# Patient Record
Sex: FEMALE | Race: WHITE | Employment: UNEMPLOYED | URBAN - METROPOLITAN AREA
[De-identification: names, ages, dates, MRNs, and addresses within clinical notes are randomized per-mention and may not be internally consistent; named-entity substitution may affect disease eponyms.]

---

## 2017-08-10 ENCOUNTER — LAB ENCOUNTER (OUTPATIENT)
Dept: LAB | Age: 57
End: 2017-08-10
Attending: FAMILY MEDICINE
Payer: COMMERCIAL

## 2017-08-10 ENCOUNTER — OFFICE VISIT (OUTPATIENT)
Dept: FAMILY MEDICINE CLINIC | Facility: CLINIC | Age: 57
End: 2017-08-10

## 2017-08-10 VITALS
DIASTOLIC BLOOD PRESSURE: 67 MMHG | BODY MASS INDEX: 26.13 KG/M2 | SYSTOLIC BLOOD PRESSURE: 111 MMHG | HEART RATE: 82 BPM | WEIGHT: 186.63 LBS | HEIGHT: 71 IN

## 2017-08-10 DIAGNOSIS — E03.9 HYPOTHYROIDISM, UNSPECIFIED TYPE: ICD-10-CM

## 2017-08-10 DIAGNOSIS — E78.5 HYPERLIPIDEMIA, UNSPECIFIED HYPERLIPIDEMIA TYPE: ICD-10-CM

## 2017-08-10 DIAGNOSIS — E53.8 VITAMIN B12 DEFICIENCY: ICD-10-CM

## 2017-08-10 DIAGNOSIS — Z00.00 ROUTINE MEDICAL EXAM: ICD-10-CM

## 2017-08-10 DIAGNOSIS — I10 ESSENTIAL HYPERTENSION: Primary | ICD-10-CM

## 2017-08-10 DIAGNOSIS — F10.10 ALCOHOL ABUSE: ICD-10-CM

## 2017-08-10 LAB
ALBUMIN SERPL BCP-MCNC: 4.4 G/DL (ref 3.5–4.8)
ALBUMIN/GLOB SERPL: 1.4 {RATIO} (ref 1–2)
ALP SERPL-CCNC: 61 U/L (ref 32–100)
ALT SERPL-CCNC: 34 U/L (ref 14–54)
ANION GAP SERPL CALC-SCNC: 8 MMOL/L (ref 0–18)
AST SERPL-CCNC: 34 U/L (ref 15–41)
BASOPHILS # BLD: 0 K/UL (ref 0–0.2)
BASOPHILS NFR BLD: 1 %
BILIRUB SERPL-MCNC: 0.5 MG/DL (ref 0.3–1.2)
BUN SERPL-MCNC: 15 MG/DL (ref 8–20)
BUN/CREAT SERPL: 17.6 (ref 10–20)
CALCIUM SERPL-MCNC: 9.7 MG/DL (ref 8.5–10.5)
CHLORIDE SERPL-SCNC: 108 MMOL/L (ref 95–110)
CHOLEST SERPL-MCNC: 203 MG/DL (ref 110–200)
CO2 SERPL-SCNC: 25 MMOL/L (ref 22–32)
CREAT SERPL-MCNC: 0.85 MG/DL (ref 0.5–1.5)
EOSINOPHIL # BLD: 0 K/UL (ref 0–0.7)
EOSINOPHIL NFR BLD: 0 %
ERYTHROCYTE [DISTWIDTH] IN BLOOD BY AUTOMATED COUNT: 14.3 % (ref 11–15)
GLOBULIN PLAS-MCNC: 3.1 G/DL (ref 2.5–3.7)
GLUCOSE SERPL-MCNC: 90 MG/DL (ref 70–99)
HCT VFR BLD AUTO: 39.1 % (ref 35–48)
HDLC SERPL-MCNC: 93 MG/DL
HGB BLD-MCNC: 13.1 G/DL (ref 12–16)
LDLC SERPL CALC-MCNC: 90 MG/DL (ref 0–99)
LYMPHOCYTES # BLD: 1.3 K/UL (ref 1–4)
LYMPHOCYTES NFR BLD: 31 %
MCH RBC QN AUTO: 31.9 PG (ref 27–32)
MCHC RBC AUTO-ENTMCNC: 33.5 G/DL (ref 32–37)
MCV RBC AUTO: 95.2 FL (ref 80–100)
MONOCYTES # BLD: 0.6 K/UL (ref 0–1)
MONOCYTES NFR BLD: 14 %
NEUTROPHILS # BLD AUTO: 2.2 K/UL (ref 1.8–7.7)
NEUTROPHILS NFR BLD: 55 %
NONHDLC SERPL-MCNC: 110 MG/DL
OSMOLALITY UR CALC.SUM OF ELEC: 292 MOSM/KG (ref 275–295)
PLATELET # BLD AUTO: 300 K/UL (ref 140–400)
PMV BLD AUTO: 8.4 FL (ref 7.4–10.3)
POTASSIUM SERPL-SCNC: 4.4 MMOL/L (ref 3.3–5.1)
PROT SERPL-MCNC: 7.5 G/DL (ref 5.9–8.4)
RBC # BLD AUTO: 4.11 M/UL (ref 3.7–5.4)
SODIUM SERPL-SCNC: 141 MMOL/L (ref 136–144)
T3 SERPL-MCNC: 0.75 NG/ML (ref 0.87–1.78)
T4 FREE SERPL-MCNC: 1.03 NG/DL (ref 0.58–1.64)
TRIGL SERPL-MCNC: 99 MG/DL (ref 1–149)
TSH SERPL-ACNC: 0.22 UIU/ML (ref 0.45–5.33)
VIT B12 SERPL-MCNC: 1454 PG/ML (ref 181–914)
WBC # BLD AUTO: 4.1 K/UL (ref 4–11)

## 2017-08-10 PROCEDURE — 36415 COLL VENOUS BLD VENIPUNCTURE: CPT

## 2017-08-10 PROCEDURE — 82306 VITAMIN D 25 HYDROXY: CPT

## 2017-08-10 PROCEDURE — 82607 VITAMIN B-12: CPT

## 2017-08-10 PROCEDURE — 84443 ASSAY THYROID STIM HORMONE: CPT

## 2017-08-10 PROCEDURE — 84439 ASSAY OF FREE THYROXINE: CPT

## 2017-08-10 PROCEDURE — 84480 ASSAY TRIIODOTHYRONINE (T3): CPT

## 2017-08-10 PROCEDURE — 80053 COMPREHEN METABOLIC PANEL: CPT

## 2017-08-10 PROCEDURE — 85025 COMPLETE CBC W/AUTO DIFF WBC: CPT

## 2017-08-10 PROCEDURE — 99396 PREV VISIT EST AGE 40-64: CPT | Performed by: FAMILY MEDICINE

## 2017-08-10 PROCEDURE — 80061 LIPID PANEL: CPT

## 2017-08-10 RX ORDER — AMLODIPINE BESYLATE 10 MG/1
10 TABLET ORAL DAILY
Qty: 90 TABLET | Refills: 4 | Status: SHIPPED | OUTPATIENT
Start: 2017-08-10 | End: 2018-03-14

## 2017-08-10 RX ORDER — LEVOTHYROXINE SODIUM 0.1 MG/1
100 TABLET ORAL DAILY
Qty: 90 TABLET | Refills: 4 | Status: SHIPPED | OUTPATIENT
Start: 2017-08-10 | End: 2018-08-27

## 2017-08-10 RX ORDER — LOSARTAN POTASSIUM 100 MG/1
100 TABLET ORAL DAILY
Qty: 90 TABLET | Refills: 4 | Status: SHIPPED | OUTPATIENT
Start: 2017-08-10 | End: 2018-03-14

## 2017-08-10 NOTE — PROGRESS NOTES
HPI:   Nathalia Cassidy is a 61year old female who presents for a complete physical exam.    Pt here for regular physical. Still drinking large amounts of alcohol and has no desire to stop. Walks 5-6 miles every day - walks on treadmill and walks outside.  Repor REVIEW OF SYSTEMS:   GENERAL: feels well otherwise  SKIN: denies any skin lesions  EYES:denies blurred vision or double vision  HEENT: denies nasal congestion, sinus pain or ST  LUNGS: denies shortness of breath with exertion, denies orthopnea  CARDIOV VITAMIN D, 25-HYDROXY; Future         Marilin Patel MD  8/10/2017  10:11 AM

## 2017-08-11 LAB — 25(OH)D3 SERPL-MCNC: 51.9 NG/ML

## 2017-08-14 ENCOUNTER — TELEPHONE (OUTPATIENT)
Dept: FAMILY MEDICINE CLINIC | Facility: CLINIC | Age: 57
End: 2017-08-14

## 2017-08-14 NOTE — TELEPHONE ENCOUNTER
Dr. Ava Perez received and completed Health Provider Screening form. Form faxed to [phone] confirmation received. Copy sent to [address] [place] [address] scanning department.

## 2017-08-18 DIAGNOSIS — E03.9 HYPOTHYROIDISM, UNSPECIFIED TYPE: ICD-10-CM

## 2017-08-18 DIAGNOSIS — R79.89 ABNORMAL THYROID BLOOD TEST: Primary | ICD-10-CM

## 2017-08-18 NOTE — PROGRESS NOTES
Yojana Saab - your thyroid levels are off right now. Before having you see an endocrinologist - thyroid specialist - will have you repeat those labs in 3-4 weeks. If still abnormal, will need you to see Dr. Fabiola Reyez.  Also you can cut back a little on your vitamin b

## 2018-03-01 ENCOUNTER — APPOINTMENT (OUTPATIENT)
Dept: GENERAL RADIOLOGY | Facility: HOSPITAL | Age: 58
DRG: 853 | End: 2018-03-01
Attending: EMERGENCY MEDICINE
Payer: COMMERCIAL

## 2018-03-01 ENCOUNTER — HOSPITAL ENCOUNTER (INPATIENT)
Facility: HOSPITAL | Age: 58
LOS: 13 days | Discharge: HOME | DRG: 853 | End: 2018-03-14
Attending: EMERGENCY MEDICINE | Admitting: HOSPITALIST
Payer: COMMERCIAL

## 2018-03-01 ENCOUNTER — ANESTHESIA EVENT (OUTPATIENT)
Dept: SURGERY | Facility: HOSPITAL | Age: 58
DRG: 853 | End: 2018-03-01
Payer: COMMERCIAL

## 2018-03-01 ENCOUNTER — ANESTHESIA (OUTPATIENT)
Dept: SURGERY | Facility: HOSPITAL | Age: 58
DRG: 853 | End: 2018-03-01
Payer: COMMERCIAL

## 2018-03-01 ENCOUNTER — SURGERY (OUTPATIENT)
Age: 58
End: 2018-03-01

## 2018-03-01 ENCOUNTER — APPOINTMENT (OUTPATIENT)
Dept: CT IMAGING | Facility: HOSPITAL | Age: 58
DRG: 853 | End: 2018-03-01
Attending: EMERGENCY MEDICINE
Payer: COMMERCIAL

## 2018-03-01 ENCOUNTER — APPOINTMENT (OUTPATIENT)
Dept: GENERAL RADIOLOGY | Facility: HOSPITAL | Age: 58
DRG: 853 | End: 2018-03-01
Attending: ANESTHESIOLOGY
Payer: COMMERCIAL

## 2018-03-01 DIAGNOSIS — K63.1 PERFORATED BOWEL (HCC): ICD-10-CM

## 2018-03-01 DIAGNOSIS — R57.9 SHOCK (HCC): Primary | ICD-10-CM

## 2018-03-01 DIAGNOSIS — E87.8 LOW BICARBONATE: ICD-10-CM

## 2018-03-01 DIAGNOSIS — N17.9 AKI (ACUTE KIDNEY INJURY) (HCC): ICD-10-CM

## 2018-03-01 LAB
ALBUMIN SERPL BCP-MCNC: 3 G/DL (ref 3.5–4.8)
ALP SERPL-CCNC: 40 U/L (ref 32–100)
ALT SERPL-CCNC: 22 U/L (ref 14–54)
AMMONIA PLAS-MCNC: 61 UG/DL (ref 19.5–64.6)
ANION GAP SERPL CALC-SCNC: 19 MMOL/L (ref 0–18)
ANTIBODY SCREEN: NEGATIVE
APTT PPP: 23.2 SECONDS (ref 23.2–35.3)
APTT PPP: 27.5 SECONDS (ref 23.2–35.3)
AST SERPL-CCNC: 33 U/L (ref 15–41)
BASE EXCESS BLD CALC-SCNC: -12.1 MMOL/L (ref ?–2)
BASE EXCESS BLD CALC-SCNC: -18.1 MMOL/L (ref ?–2)
BASE EXCESS BLD CALC-SCNC: -5.4 MMOL/L (ref ?–2)
BASE EXCESS BLD CALC-SCNC: -7.1 MMOL/L (ref ?–2)
BASOPHILS # BLD: 0 K/UL (ref 0–0.2)
BASOPHILS NFR BLD: 0 %
BILIRUB DIRECT SERPL-MCNC: 0.2 MG/DL (ref 0–0.2)
BILIRUB SERPL-MCNC: 1.3 MG/DL (ref 0.3–1.2)
BILIRUB UR QL: NEGATIVE
BLOOD TYPE BARCODE: 5100
BLOOD TYPE BARCODE: 5100
BLOOD TYPE BARCODE: 6200
BUN SERPL-MCNC: 50 MG/DL (ref 8–20)
BUN/CREAT SERPL: 18.2 (ref 10–20)
CA-I BLD-SCNC: 1.23 MMOL/L (ref 0.95–1.32)
CA-I BLD-SCNC: 1.25 MMOL/L (ref 0.95–1.32)
CA-I BLD-SCNC: 1.27 MMOL/L (ref 0.95–1.32)
CALCIUM SERPL-MCNC: 9.6 MG/DL (ref 8.5–10.5)
CHLORIDE SERPL-SCNC: 102 MMOL/L (ref 95–110)
CHOLEST SERPL-MCNC: 171 MG/DL (ref 110–200)
CO2 SERPL-SCNC: 11 MMOL/L (ref 22–32)
COHGB MFR BLD: 1.5 % (ref 0–1.5)
COHGB MFR BLD: 1.7 % (ref 0–1.5)
COHGB MFR BLD: 1.8 % (ref 0–1.5)
COLOR UR: YELLOW
CREAT SERPL-MCNC: 2.74 MG/DL (ref 0.5–1.5)
D DIMER PPP FEU-MCNC: 2.72 MCG/ML (ref ?–0.58)
EOSINOPHIL # BLD: 0 K/UL (ref 0–0.7)
EOSINOPHIL NFR BLD: 0 %
ERYTHROCYTE [DISTWIDTH] IN BLOOD BY AUTOMATED COUNT: 13.4 % (ref 11–15)
ERYTHROCYTE [DISTWIDTH] IN BLOOD BY AUTOMATED COUNT: 13.6 % (ref 11–15)
ERYTHROCYTE [DISTWIDTH] IN BLOOD BY AUTOMATED COUNT: 13.8 % (ref 11–15)
FIBRINOGEN PPP-MCNC: 304 MG/DL (ref 176–491)
GLUCOSE BLDC GLUCOMTR-MCNC: 293 MG/DL (ref 70–99)
GLUCOSE SERPL-MCNC: 319 MG/DL (ref 70–99)
GLUCOSE UR-MCNC: 50 MG/DL
HCO3 BLDA-SCNC: 15.8 MEQ/L (ref 21–27)
HCO3 BLDA-SCNC: 19.3 MEQ/L (ref 21–27)
HCO3 BLDA-SCNC: 20.4 MEQ/L (ref 21–27)
HCO3 BLDA-SCNC: 8.6 MEQ/L (ref 21–27)
HCT VFR BLD AUTO: 38.2 % (ref 35–48)
HCT VFR BLD AUTO: 45.1 % (ref 35–48)
HCT VFR BLD AUTO: 47.7 % (ref 35–48)
HDLC SERPL-MCNC: 92 MG/DL
HGB BLD-MCNC: 11.6 G/DL (ref 12–16)
HGB BLD-MCNC: 12.5 G/DL (ref 12–16)
HGB BLD-MCNC: 12.7 G/DL (ref 12–16)
HGB BLD-MCNC: 15 G/DL (ref 12–16)
HGB BLD-MCNC: 15.4 G/DL (ref 12–16)
HGB BLD-MCNC: 15.4 G/DL (ref 12–16)
INR BLD: 1.1 (ref 0.9–1.2)
INR BLD: 1.3 (ref 0.9–1.2)
LACTATE SERPL-SCNC: 1.8 MMOL/L (ref 0.5–2.2)
LACTATE SERPL-SCNC: 2.1 MMOL/L (ref 0.5–2.2)
LACTATE SERPL-SCNC: 9.5 MMOL/L (ref 0.5–2.2)
LDLC SERPL CALC-MCNC: 50 MG/DL (ref 0–99)
LEUKOCYTE ESTERASE UR QL STRIP.AUTO: NEGATIVE
LIPASE SERPL-CCNC: 97 U/L (ref 22–51)
LYMPHOCYTES # BLD: 0.4 K/UL (ref 1–4)
LYMPHOCYTES # BLD: 0.5 K/UL (ref 1–4)
LYMPHOCYTES # BLD: 0.7 K/UL (ref 1–4)
LYMPHOCYTES NFR BLD: 18 %
LYMPHOCYTES NFR BLD: 21 %
LYMPHOCYTES NFR BLD: 21 %
MAGNESIUM SERPL-MCNC: 2 MG/DL (ref 1.8–2.5)
MAGNESIUM SERPL-MCNC: 2 MG/DL (ref 1.8–2.5)
MCH RBC QN AUTO: 31.6 PG (ref 27–32)
MCH RBC QN AUTO: 31.8 PG (ref 27–32)
MCH RBC QN AUTO: 31.8 PG (ref 27–32)
MCHC RBC AUTO-ENTMCNC: 32.2 G/DL (ref 32–37)
MCHC RBC AUTO-ENTMCNC: 33.2 G/DL (ref 32–37)
MCHC RBC AUTO-ENTMCNC: 33.2 G/DL (ref 32–37)
MCV RBC AUTO: 95.2 FL (ref 80–100)
MCV RBC AUTO: 95.8 FL (ref 80–100)
MCV RBC AUTO: 98.7 FL (ref 80–100)
METHGB MFR BLD: 0.4 % SAT (ref 0.4–1.5)
METHGB MFR BLD: 0.4 % SAT (ref 0.4–1.5)
METHGB MFR BLD: 0.5 % SAT (ref 0.4–1.5)
MONOCYTES # BLD: 0.1 K/UL (ref 0–1)
MONOCYTES # BLD: 0.2 K/UL (ref 0–1)
MONOCYTES # BLD: 0.2 K/UL (ref 0–1)
MONOCYTES NFR BLD: 6 %
MONOCYTES NFR BLD: 7 %
MONOCYTES NFR BLD: 8 %
NEUTROPHILS # BLD AUTO: 1.3 K/UL (ref 1.8–7.7)
NEUTROPHILS # BLD AUTO: 1.6 K/UL (ref 1.8–7.7)
NEUTROPHILS # BLD AUTO: 2.9 K/UL (ref 1.8–7.7)
NEUTROPHILS NFR BLD: 71 %
NEUTROPHILS NFR BLD: 71 %
NEUTROPHILS NFR BLD: 75 %
NITRITE UR QL STRIP.AUTO: NEGATIVE
NONHDLC SERPL-MCNC: 79 MG/DL
O2 CT BLD-SCNC: 16.1 VOL% (ref 15–23)
O2 CT BLD-SCNC: 16.7 VOL% (ref 15–23)
O2 CT BLD-SCNC: 18 VOL% (ref 15–23)
O2 CT BLD-SCNC: 18.7 VOL% (ref 15–23)
O2/TOTAL GAS SETTING VFR VENT: 100 %
OPIATES UR QL SCN: DETECTED
OSMOLALITY UR CALC.SUM OF ELEC: 300 MOSM/KG (ref 275–295)
OXYGEN LITERS/MINUTE: 4 L/MIN
PCO2 BLDA: 23 MM HG (ref 35–45)
PCO2 BLDA: 37 MM HG (ref 35–45)
PCO2 BLDA: 43 MM HG (ref 35–45)
PCO2 BLDA: 52 MM HG (ref 35–45)
PEEP SETTING VENT: 5 CM H2O
PH BLDA: 7.19 [PH] (ref 7.35–7.45)
PH BLDA: 7.2 [PH] (ref 7.35–7.45)
PH BLDA: 7.22 [PH] (ref 7.35–7.45)
PH BLDA: 7.34 [PH] (ref 7.35–7.45)
PH UR: 5 [PH] (ref 5–8)
PLATELET # BLD AUTO: 160 K/UL (ref 140–400)
PLATELET # BLD AUTO: 244 K/UL (ref 140–400)
PLATELET # BLD AUTO: 245 K/UL (ref 140–400)
PLATELET # BLD AUTO: 348 K/UL (ref 140–400)
PMV BLD AUTO: 8.3 FL (ref 7.4–10.3)
PMV BLD AUTO: 8.8 FL (ref 7.4–10.3)
PMV BLD AUTO: 9.1 FL (ref 7.4–10.3)
PO2 BLDA: 137 MM HG (ref 80–100)
PO2 BLDA: 62 MM HG (ref 80–100)
PO2 BLDA: 71 MM HG (ref 80–100)
PO2 BLDA: 71 MM HG (ref 80–100)
PO2 SATN ADJ TO 0.5 BLD: 20 MMHG (ref 24–28)
PO2 SATN ADJ TO 0.5 BLD: 30 MMHG (ref 24–28)
PO2 SATN ADJ TO 0.5 BLD: 32 MMHG (ref 24–28)
PO2 SATN ADJ TO 0.5 BLD: 32 MMHG (ref 24–28)
POTASSIUM (BLOOD GAS): 4.1 MMOL/L (ref 3.3–5.1)
POTASSIUM (BLOOD GAS): 4.4 MMOL/L (ref 3.3–5.1)
POTASSIUM (BLOOD GAS): 4.6 MMOL/L (ref 3.3–5.1)
POTASSIUM SERPL-SCNC: 4.8 MMOL/L (ref 3.3–5.1)
PROT SERPL-MCNC: 5.6 G/DL (ref 5.9–8.4)
PROT UR-MCNC: 100 MG/DL
PROTHROMBIN TIME: 14.2 SECONDS (ref 11.8–14.5)
PROTHROMBIN TIME: 15.8 SECONDS (ref 11.8–14.5)
PUNCTURE CHARGE: NO
PUNCTURE CHARGE: YES
RBC # BLD AUTO: 4.01 M/UL (ref 3.7–5.4)
RBC # BLD AUTO: 4.7 M/UL (ref 3.7–5.4)
RBC # BLD AUTO: 4.83 M/UL (ref 3.7–5.4)
RBC #/AREA URNS AUTO: 2 /HPF
RESP RATE: 16 BPM
RH BLOOD TYPE: POSITIVE
SAO2 % BLDA: 88.6 % (ref 94–100)
SAO2 % BLDA: 91 % (ref 94–100)
SAO2 % BLDA: 96.6 % (ref 94–100)
SAO2 % BLDA: >98.5 % (ref 94–100)
SODIUM (BLOOD GAS): 142 MMOL/L (ref 135–145)
SODIUM (BLOOD GAS): 146 MMOL/L (ref 135–145)
SODIUM (BLOOD GAS): 146 MMOL/L (ref 135–145)
SODIUM SERPL-SCNC: 132 MMOL/L (ref 136–144)
SP GR UR STRIP: 1.02 (ref 1–1.03)
SPECIMEN VOL 24H UR: 500 ML
TRIGL SERPL-MCNC: 143 MG/DL (ref 1–149)
TROPONIN I SERPL-MCNC: 0.04 NG/ML (ref ?–0.03)
UROBILINOGEN UR STRIP-ACNC: <2
VIT C UR-MCNC: NEGATIVE MG/DL
WBC # BLD AUTO: 1.8 K/UL (ref 4–11)
WBC # BLD AUTO: 2.3 K/UL (ref 4–11)
WBC # BLD AUTO: 3.9 K/UL (ref 4–11)
WBC #/AREA URNS AUTO: 5 /HPF

## 2018-03-01 PROCEDURE — 30233N1 TRANSFUSION OF NONAUTOLOGOUS RED BLOOD CELLS INTO PERIPHERAL VEIN, PERCUTANEOUS APPROACH: ICD-10-PCS | Performed by: EMERGENCY MEDICINE

## 2018-03-01 PROCEDURE — 99291 CRITICAL CARE FIRST HOUR: CPT | Performed by: INTERNAL MEDICINE

## 2018-03-01 PROCEDURE — 0D9640Z DRAINAGE OF STOMACH WITH DRAINAGE DEVICE, PERCUTANEOUS ENDOSCOPIC APPROACH: ICD-10-PCS | Performed by: SURGERY

## 2018-03-01 PROCEDURE — 5A1935Z RESPIRATORY VENTILATION, LESS THAN 24 CONSECUTIVE HOURS: ICD-10-PCS | Performed by: SURGERY

## 2018-03-01 PROCEDURE — 71045 X-RAY EXAM CHEST 1 VIEW: CPT | Performed by: ANESTHESIOLOGY

## 2018-03-01 PROCEDURE — 71045 X-RAY EXAM CHEST 1 VIEW: CPT | Performed by: EMERGENCY MEDICINE

## 2018-03-01 PROCEDURE — 0DQ74ZZ REPAIR STOMACH, PYLORUS, PERCUTANEOUS ENDOSCOPIC APPROACH: ICD-10-PCS | Performed by: SURGERY

## 2018-03-01 PROCEDURE — 71260 CT THORAX DX C+: CPT | Performed by: EMERGENCY MEDICINE

## 2018-03-01 PROCEDURE — 05HM33Z INSERTION OF INFUSION DEVICE INTO RIGHT INTERNAL JUGULAR VEIN, PERCUTANEOUS APPROACH: ICD-10-PCS | Performed by: EMERGENCY MEDICINE

## 2018-03-01 PROCEDURE — 74177 CT ABD & PELVIS W/CONTRAST: CPT | Performed by: EMERGENCY MEDICINE

## 2018-03-01 RX ORDER — MORPHINE SULFATE 4 MG/ML
4 INJECTION, SOLUTION INTRAMUSCULAR; INTRAVENOUS EVERY 2 HOUR PRN
Status: DISCONTINUED | OUTPATIENT
Start: 2018-03-01 | End: 2018-03-14

## 2018-03-01 RX ORDER — ONDANSETRON 2 MG/ML
INJECTION INTRAMUSCULAR; INTRAVENOUS
Status: DISPENSED
Start: 2018-03-01 | End: 2018-03-01

## 2018-03-01 RX ORDER — MORPHINE SULFATE 2 MG/ML
2 INJECTION, SOLUTION INTRAMUSCULAR; INTRAVENOUS ONCE
Status: COMPLETED | OUTPATIENT
Start: 2018-03-01 | End: 2018-03-01

## 2018-03-01 RX ORDER — SODIUM CHLORIDE 9 MG/ML
INJECTION, SOLUTION INTRAVENOUS CONTINUOUS PRN
Status: DISCONTINUED | OUTPATIENT
Start: 2018-03-01 | End: 2018-03-01 | Stop reason: SURG

## 2018-03-01 RX ORDER — MORPHINE SULFATE 4 MG/ML
4 INJECTION, SOLUTION INTRAMUSCULAR; INTRAVENOUS ONCE
Status: COMPLETED | OUTPATIENT
Start: 2018-03-01 | End: 2018-03-01

## 2018-03-01 RX ORDER — MORPHINE SULFATE 4 MG/ML
8 INJECTION, SOLUTION INTRAMUSCULAR; INTRAVENOUS EVERY 2 HOUR PRN
Status: DISCONTINUED | OUTPATIENT
Start: 2018-03-01 | End: 2018-03-14

## 2018-03-01 RX ORDER — MORPHINE SULFATE 2 MG/ML
2 INJECTION, SOLUTION INTRAMUSCULAR; INTRAVENOUS EVERY 2 HOUR PRN
Status: DISCONTINUED | OUTPATIENT
Start: 2018-03-01 | End: 2018-03-14

## 2018-03-01 RX ORDER — ONDANSETRON 2 MG/ML
4 INJECTION INTRAMUSCULAR; INTRAVENOUS EVERY 6 HOURS PRN
Status: DISCONTINUED | OUTPATIENT
Start: 2018-03-01 | End: 2018-03-14

## 2018-03-01 RX ORDER — 0.9 % SODIUM CHLORIDE 0.9 %
VIAL (ML) INJECTION
Status: COMPLETED
Start: 2018-03-01 | End: 2018-03-01

## 2018-03-01 RX ORDER — EPHEDRINE SULFATE 50 MG/ML
INJECTION, SOLUTION INTRAVENOUS AS NEEDED
Status: DISCONTINUED | OUTPATIENT
Start: 2018-03-01 | End: 2018-03-01 | Stop reason: SURG

## 2018-03-01 RX ORDER — MELATONIN
100 DAILY
Status: DISCONTINUED | OUTPATIENT
Start: 2018-03-02 | End: 2018-03-02

## 2018-03-01 RX ORDER — ROCURONIUM BROMIDE 10 MG/ML
INJECTION, SOLUTION INTRAVENOUS AS NEEDED
Status: DISCONTINUED | OUTPATIENT
Start: 2018-03-01 | End: 2018-03-01 | Stop reason: SURG

## 2018-03-01 RX ORDER — LORAZEPAM 2 MG/ML
1 INJECTION INTRAMUSCULAR EVERY 30 MIN PRN
Status: DISCONTINUED | OUTPATIENT
Start: 2018-03-01 | End: 2018-03-08

## 2018-03-01 RX ORDER — LIDOCAINE HYDROCHLORIDE 10 MG/ML
INJECTION, SOLUTION EPIDURAL; INFILTRATION; INTRACAUDAL; PERINEURAL AS NEEDED
Status: DISCONTINUED | OUTPATIENT
Start: 2018-03-01 | End: 2018-03-01 | Stop reason: SURG

## 2018-03-01 RX ORDER — LORAZEPAM 2 MG/ML
4 INJECTION INTRAMUSCULAR EVERY 10 MIN PRN
Status: DISCONTINUED | OUTPATIENT
Start: 2018-03-01 | End: 2018-03-08

## 2018-03-01 RX ORDER — ETOMIDATE 2 MG/ML
INJECTION INTRAVENOUS AS NEEDED
Status: DISCONTINUED | OUTPATIENT
Start: 2018-03-01 | End: 2018-03-01 | Stop reason: SURG

## 2018-03-01 RX ORDER — HEPARIN SODIUM 1000 [USP'U]/ML
INJECTION, SOLUTION INTRAVENOUS; SUBCUTANEOUS AS NEEDED
Status: DISCONTINUED | OUTPATIENT
Start: 2018-03-01 | End: 2018-03-01 | Stop reason: HOSPADM

## 2018-03-01 RX ORDER — LORAZEPAM 2 MG/ML
3 INJECTION INTRAMUSCULAR
Status: DISCONTINUED | OUTPATIENT
Start: 2018-03-01 | End: 2018-03-08

## 2018-03-01 RX ORDER — SODIUM CHLORIDE 9 MG/ML
INJECTION, SOLUTION INTRAVENOUS CONTINUOUS
Status: DISCONTINUED | OUTPATIENT
Start: 2018-03-01 | End: 2018-03-08

## 2018-03-01 RX ORDER — ALBUMIN, HUMAN INJ 5% 5 %
500 SOLUTION INTRAVENOUS ONCE
Status: COMPLETED | OUTPATIENT
Start: 2018-03-01 | End: 2018-03-01

## 2018-03-01 RX ORDER — MORPHINE SULFATE 2 MG/ML
INJECTION, SOLUTION INTRAMUSCULAR; INTRAVENOUS
Status: DISPENSED
Start: 2018-03-01 | End: 2018-03-01

## 2018-03-01 RX ORDER — ONDANSETRON 2 MG/ML
4 INJECTION INTRAMUSCULAR; INTRAVENOUS ONCE
Status: COMPLETED | OUTPATIENT
Start: 2018-03-01 | End: 2018-03-01

## 2018-03-01 RX ORDER — MIDAZOLAM HYDROCHLORIDE 1 MG/ML
INJECTION INTRAMUSCULAR; INTRAVENOUS AS NEEDED
Status: DISCONTINUED | OUTPATIENT
Start: 2018-03-01 | End: 2018-03-01 | Stop reason: SURG

## 2018-03-01 RX ORDER — HEPARIN SODIUM 5000 [USP'U]/ML
5000 INJECTION, SOLUTION INTRAVENOUS; SUBCUTANEOUS EVERY 12 HOURS SCHEDULED
Status: DISCONTINUED | OUTPATIENT
Start: 2018-03-02 | End: 2018-03-14

## 2018-03-01 RX ORDER — PHENYLEPHRINE HCL 10 MG/ML
VIAL (ML) INJECTION AS NEEDED
Status: DISCONTINUED | OUTPATIENT
Start: 2018-03-01 | End: 2018-03-01 | Stop reason: SURG

## 2018-03-01 RX ORDER — BUPIVACAINE HYDROCHLORIDE 2.5 MG/ML
INJECTION, SOLUTION EPIDURAL; INFILTRATION; INTRACAUDAL AS NEEDED
Status: DISCONTINUED | OUTPATIENT
Start: 2018-03-01 | End: 2018-03-01 | Stop reason: HOSPADM

## 2018-03-01 RX ORDER — FOLIC ACID 1 MG/1
1 TABLET ORAL DAILY
Status: DISCONTINUED | OUTPATIENT
Start: 2018-03-01 | End: 2018-03-02

## 2018-03-01 RX ORDER — ALBUMIN (HUMAN) 12.5 G/50ML
25 SOLUTION INTRAVENOUS ONCE
Status: DISCONTINUED | OUTPATIENT
Start: 2018-03-01 | End: 2018-03-14

## 2018-03-01 RX ORDER — LORAZEPAM 2 MG/ML
2 INJECTION INTRAMUSCULAR
Status: DISCONTINUED | OUTPATIENT
Start: 2018-03-01 | End: 2018-03-08

## 2018-03-01 RX ORDER — FLUCONAZOLE 2 MG/ML
400 INJECTION, SOLUTION INTRAVENOUS EVERY 24 HOURS
Status: DISCONTINUED | OUTPATIENT
Start: 2018-03-01 | End: 2018-03-01 | Stop reason: HOSPADM

## 2018-03-01 RX ORDER — CALCIUM CHLORIDE 100 MG/ML
INJECTION INTRAVENOUS; INTRAVENTRICULAR AS NEEDED
Status: DISCONTINUED | OUTPATIENT
Start: 2018-03-01 | End: 2018-03-01 | Stop reason: SURG

## 2018-03-01 RX ORDER — FLUCONAZOLE 2 MG/ML
400 INJECTION, SOLUTION INTRAVENOUS EVERY 24 HOURS
Status: DISCONTINUED | OUTPATIENT
Start: 2018-03-02 | End: 2018-03-14

## 2018-03-01 RX ORDER — ONDANSETRON 2 MG/ML
INJECTION INTRAMUSCULAR; INTRAVENOUS AS NEEDED
Status: DISCONTINUED | OUTPATIENT
Start: 2018-03-01 | End: 2018-03-01 | Stop reason: SURG

## 2018-03-01 RX ORDER — MULTIPLE VITAMINS W/ MINERALS TAB 9MG-400MCG
1 TAB ORAL DAILY
Status: DISCONTINUED | OUTPATIENT
Start: 2018-03-01 | End: 2018-03-02

## 2018-03-01 RX ORDER — METOCLOPRAMIDE HYDROCHLORIDE 5 MG/ML
5 INJECTION INTRAMUSCULAR; INTRAVENOUS EVERY 6 HOURS PRN
Status: DISCONTINUED | OUTPATIENT
Start: 2018-03-01 | End: 2018-03-06

## 2018-03-01 RX ORDER — ALBUMIN, HUMAN INJ 5% 5 %
SOLUTION INTRAVENOUS CONTINUOUS PRN
Status: DISCONTINUED | OUTPATIENT
Start: 2018-03-01 | End: 2018-03-01 | Stop reason: SURG

## 2018-03-01 RX ORDER — SODIUM CHLORIDE 9 MG/ML
INJECTION, SOLUTION INTRAVENOUS ONCE
Status: COMPLETED | OUTPATIENT
Start: 2018-03-01 | End: 2018-03-01

## 2018-03-01 RX ORDER — DEXAMETHASONE SODIUM PHOSPHATE 4 MG/ML
VIAL (ML) INJECTION AS NEEDED
Status: DISCONTINUED | OUTPATIENT
Start: 2018-03-01 | End: 2018-03-01 | Stop reason: SURG

## 2018-03-01 RX ORDER — SODIUM CHLORIDE 9 MG/ML
INJECTION, SOLUTION INTRAVENOUS
Status: COMPLETED
Start: 2018-03-01 | End: 2018-03-01

## 2018-03-01 RX ADMIN — SODIUM CHLORIDE: 9 INJECTION, SOLUTION INTRAVENOUS at 10:15:00

## 2018-03-01 RX ADMIN — MIDAZOLAM HYDROCHLORIDE 2 MG: 1 INJECTION INTRAMUSCULAR; INTRAVENOUS at 10:00:00

## 2018-03-01 RX ADMIN — Medication 50 ML: at 10:10:00

## 2018-03-01 RX ADMIN — ALBUMIN, HUMAN INJ 5%: 5 SOLUTION INTRAVENOUS at 11:04:00

## 2018-03-01 RX ADMIN — LIDOCAINE HYDROCHLORIDE 50 MG: 10 INJECTION, SOLUTION EPIDURAL; INFILTRATION; INTRACAUDAL; PERINEURAL at 10:05:00

## 2018-03-01 RX ADMIN — ONDANSETRON 4 MG: 2 INJECTION INTRAMUSCULAR; INTRAVENOUS at 12:34:00

## 2018-03-01 RX ADMIN — EPHEDRINE SULFATE 10 MG: 50 INJECTION, SOLUTION INTRAVENOUS at 10:05:00

## 2018-03-01 RX ADMIN — ROCURONIUM BROMIDE 10 MG: 10 INJECTION, SOLUTION INTRAVENOUS at 11:35:00

## 2018-03-01 RX ADMIN — PHENYLEPHRINE HCL 200 MCG: 10 MG/ML VIAL (ML) INJECTION at 10:05:00

## 2018-03-01 RX ADMIN — ROCURONIUM BROMIDE 50 MG: 10 INJECTION, SOLUTION INTRAVENOUS at 10:30:00

## 2018-03-01 RX ADMIN — ETOMIDATE 20 MG: 2 INJECTION INTRAVENOUS at 10:05:00

## 2018-03-01 RX ADMIN — SODIUM CHLORIDE: 9 INJECTION, SOLUTION INTRAVENOUS at 12:07:00

## 2018-03-01 RX ADMIN — SODIUM CHLORIDE: 9 INJECTION, SOLUTION INTRAVENOUS at 10:45:00

## 2018-03-01 RX ADMIN — ALBUMIN, HUMAN INJ 5%: 5 SOLUTION INTRAVENOUS at 10:50:00

## 2018-03-01 RX ADMIN — ALBUMIN, HUMAN INJ 5%: 5 SOLUTION INTRAVENOUS at 10:46:00

## 2018-03-01 RX ADMIN — ALBUMIN, HUMAN INJ 5%: 5 SOLUTION INTRAVENOUS at 10:35:00

## 2018-03-01 RX ADMIN — SODIUM CHLORIDE: 9 INJECTION, SOLUTION INTRAVENOUS at 11:30:00

## 2018-03-01 RX ADMIN — SODIUM CHLORIDE: 9 INJECTION, SOLUTION INTRAVENOUS at 12:40:00

## 2018-03-01 RX ADMIN — CALCIUM CHLORIDE 1 G: 100 INJECTION INTRAVENOUS; INTRAVENTRICULAR at 10:30:00

## 2018-03-01 RX ADMIN — ALBUMIN, HUMAN INJ 5%: 5 SOLUTION INTRAVENOUS at 10:00:00

## 2018-03-01 RX ADMIN — DEXAMETHASONE SODIUM PHOSPHATE 4 MG: 4 MG/ML VIAL (ML) INJECTION at 10:05:00

## 2018-03-01 RX ADMIN — Medication 50 ML: at 11:50:00

## 2018-03-01 RX ADMIN — Medication 50 ML: at 10:48:00

## 2018-03-01 RX ADMIN — PHENYLEPHRINE HCL 100 MCG: 10 MG/ML VIAL (ML) INJECTION at 10:15:00

## 2018-03-01 RX ADMIN — SODIUM CHLORIDE: 9 INJECTION, SOLUTION INTRAVENOUS at 09:48:00

## 2018-03-01 NOTE — ED NOTES
Sepsis note:     Total volume received  6000 ml    Time Blood Cultures Drawn: 0705    Time first antibiotic started:  0706    Repeat Lactate due at 1005

## 2018-03-01 NOTE — BRIEF OP NOTE
Pre-Operative Diagnosis: Perforated ulcer ([facility] [address] [K27.5]     Post-Operative Diagnosis: Perforated Gastric  ulcer ([address] [K27.5]     Procedure Performed:   Procedure(s):  DIAGNOSTIC LAPAROSCOPY, LAPAROSCOPIC REPAIR OF PERFORATED ULCER/VISCUS, INSERTION OF

## 2018-03-01 NOTE — ED NOTES
Report given to RN at surgery. Pt updated with plan of care after speaking with surgeon and anesthesiologist. Pradeep De Leon on chart, will send with pt. Pt currently in stable condition, remains on levo gtt at 10 mcg/min. Aox3.

## 2018-03-01 NOTE — ED NOTES
Pt states abd pain remains 8/10 after ordered pain meds given. Dr. Jermaine Bills updated. Awaiting MD orders.

## 2018-03-01 NOTE — ANESTHESIA POSTPROCEDURE EVALUATION
Patient: Anjelica Monet    Procedure Summary     Date:  03/01/18 Room / Location:  [address] [place] [address] MAIN OR 04 / [address] [place] [address] MAIN OR    Anesthesia Start:  4973 Anesthesia Stop:      Procedure:  LAPAROSCOPIC PERFORATED ULCER REPAIR (N/A ) Diagnosis:       Perforated ulcer ([address]

## 2018-03-01 NOTE — H&P
[facility] Patient Status:  Emergency    1960 MRN [id number]   Location [address] Attending Junie Demarco MD   Hosp Day # 0 PCP Jack Vargas MD     Date of Admissi Not on file.     Other Topics            Concern  Caffeine Concern        Yes    Comment:Coffee, occasionally    Social History Narrative    None on file            Current Medications:    Current Facility-Administered Medications:  norepinephrine (LEVOPHED ALKPHO 40 03/01/2018   TP 5.6 (L) 03/01/2018   AST 33 03/01/2018   ALT 22 03/01/2018   PTT 23.2 03/01/2018   INR 1.1 03/01/2018   PTP 14.2 03/01/2018   T4F 1.03 08/10/2017   TSH 0.22 (L) 08/10/2017   LIP 97 (H) 03/01/2018   TROP 0.04 (HH) 03/01/2018   B1 effort. Right lung is grossly clear. Airspace opacity in the left base, with blunting of the left costophrenic angle. Appearance may be due to atelectasis or pneumonia with a small effusion. BONES: Stable.  OTHER: Since the prior study, left PICC line has b aorta. The thoracic aorta is otherwise unremarkable and not dilated. Mediastinal fat planes are preserved. Cardiac chambers are unremarkable. Pericardium is normal. There are coronary artery calcifications.  The main pulmonary artery has a normal diameter a Abdomen Pelvis Iv Contrast, No Oral (er)    Result Date: 3/1/2018  PROCEDURE: CT ABDOMEN PELVIS IV CONTRAST NO ORAL (ER)  COMPARISON: [facility], CT CHEST PAIN/PE (IV ONLY) EM, 3/01/2018, 6:07.   [facility], XR CHEST AP WES VASCULATURE:   No aneurysm is detected. RETROPERITONEUM: No mass or lymphadenopathy is apparent. BONES:   There are post surgical changes of bilateral total hip arthroplasty. Resultant streak artifact limits assessment.  Chronic L2 vertebral body compress Acute renal failure  6. Leukopenia  7. Hypoalbuminemia  8. Hypothyroidism    Plan   -Patient presents with evidence of severe abdominal pain and near syncopal episode.   -CT abdomen pelvis with evidence of perforated viscus and extensive pneumoperitoneum

## 2018-03-01 NOTE — OPERATIVE REPORT
[facility]    PATIENT'S NAME: Arsh Courser   ATTENDING PHYSICIAN: Taye Torres MD   OPERATING PHYSICIAN: Jessica Hale MD   PATIENT ACCOUNT#:   [id number]    LOCATION:  [place] [address]  MEDICAL RECORD #:   [id number]       DATE OF BIRTH: Abundant irrigation with normal saline was continued throughout the procedure. Actually, approximately 9 L of saline were utilized throughout the procedure for irrigation until the return was clean, clear. Eventually we visualized the perforated ulcer. to close the defect at the umbilical port site. The other 5 mm ports were removed in a progressive fashion as we observed the abdomen through the 5 mm scope, confirming on the way out again good hemostasis and cleanliness of the abdomen.   The incisions we

## 2018-03-01 NOTE — CONSULTS
[facility]    PATIENT'S NAME: Cherelle Montoya   ATTENDING PHYSICIAN: Shiraz Martines MD   CONSULTING PHYSICIAN: Emeterio Mcintyre MD   PATIENT ACCOUNT#:   [id number]    LOCATION:  [address]  MEDICAL RECORD #:   [id number]       DATE OF BIRTH: revealed the presence of a significant amount of pneumoperitoneum, possibly due to a perforated gastric ulcer.   Although I understand there is a possibility of pneumatosis on the cecum and this could be a secondary diagnosis with the possibility of pneumop is an alcoholic. She lives with family, with her . She is disabled but she is self-sufficient on the activities of basic daily living. FAMILY HISTORY:  Arthritis and coronary artery disease. Father has had an aortic valve replacement.   There i age, race, and sex. Ophthalmological examination is grossly normal.  Pupils are equal, react to light and accommodation normally. The external ocular movements are intact. ENT exam shows otherwise pink mucosa without ulceration or tumoral pathology.    Nicole Mcghee

## 2018-03-01 NOTE — ADDENDUM NOTE
Addendum  created 03/01/18 1542 by Anuj Patel MD    Anesthesia Intra Flowsheets edited, Sign clinical note

## 2018-03-01 NOTE — ED NOTES
First unit of O neg blood transfusion done at this time, pt remains diaphoretic, alert and oriented, sts that she is feeling a little better but still dizzy, Dr Lucien Crane is aware

## 2018-03-01 NOTE — PROGRESS NOTES
[facility]      Sepsis Reassessment Note    BP 95/74   Pulse 105   Temp 97.6 °F (36.4 °C) (Oral)   Resp 30   SpO2 95%      9:18 AM    Cardiac:  Regularity: Regular  Rate: Tachycardic  Heart Sounds: S1,S2    Lungs:   Right: Clear  Left: Cl

## 2018-03-01 NOTE — ED PROVIDER NOTES
Patient Seen in: [facility] AND [facility] Emergency Department    History   Patient presents with:  Abdomen/Flank Pain (GI/): +MIDABDOMINAL PAIN    Stated Complaint: abdominal pain    HPI    History is provided by EMS and patient.     69-year-old female with h [03/01/18 0543]  Pulse: 112 [03/01/18 0543]  Resp: 22 [03/01/18 0543]  Temp: 97.6 °F (36.4 °C) [03/01/18 0543]  Temp src: Oral [03/01/18 0543]  SpO2: 100 % [03/01/18 0545]  O2 Device: None (Room air) [03/01/18 0558]    Current:BP (!) 74/61   Pulse 85   Tem seen  A hypoechoic stripe between the left kidney and spleen was seen  A pericardial effusion was not seen  Free fluid in the pelvis was seen    A confirmatory study by radiology was  performed. Digital images archived and paper images placed on chart. LAVENDER TALL (BNP)   Collection Time: 03/01/18  5:55 AM   Result Value Ref Range   Hold Lavender Auto Resulted    -BASIC METABOLIC PANEL (8)   Collection Time: 03/01/18  5:55 AM   Result Value Ref Range   Glucose 319 (H) 70 - 99 mg/dL   Sodium 132 (L) 136 Time: 03/01/18  5:55 AM   Result Value Ref Range   AST 33 15 - 41 U/L   ALT 22 14 - 54 U/L   Alkaline Phosphatase 40 32 - 100 U/L   Bilirubin, Total 1.3 (H) 0.3 - 1.2 mg/dL   Total Protein 5.6 (L) 5.9 - 8.4 g/dL   Albumin 3.0 (L) 3.5 - 4.8 g/dL   Bilirubin [id number]    UNIT ABO O    UNIT RH POS    Product Status Issued    Expiration Date [id number]    Blood Type Barcode 5100    -PREPARE FRESH FROZEN PLASMA   Collection Time: 03/01/18  6:56 AM   Result Value Ref Range   Blood Product [id number]    Unit characterized on CT abdomen pelvis performed concurrently. Findings were communicated to Dr. Jermaine Bills by Dr. Tahir Prado on 3/1/18 at 7:30          Ct Abdomen Pelvis Iv Contrast, No Oral (er)    Result Date: 3/1/2018  CONCLUSION:  1.  Findings compatible with perfor discussed with Dr. Maverick Almeida - requesting CT abdomen/pelvis with IV contrast, although less likely AAA given previous CT in 2016 without aneurysm  - given acuity of the situation, labs waivered.  Previous labs from 8/2017 with normal creatinine   - persistent evaluation.         EMERGENCY DEPARTMENT MEDICAL DECISION MAKING:  After obtaining the patient's history, performing the physical exam and reviewing the diagnostics, multiple initial diagnoses were considered based on the presenting problem including perfor

## 2018-03-01 NOTE — ED INITIAL ASSESSMENT (HPI)
Pt arrive [facility] with c/o abdominal pain that started 1-2 days ago, pt is pale upon arrival, sts that she is very dizzy, denies nausea/vomiting, unable to take O2 sats at this time

## 2018-03-01 NOTE — ANESTHESIA PREPROCEDURE EVALUATION
Anesthesia PreOp Note    HPI:     Mick Ficnh is a 61year old female who presents for preoperative consultation requested by: Boby Watson MD    Date of Surgery: 3/1/2018    Procedure(s):  LAPAROSCOPIC COLON RESECTION - RIGHT  Indication: Perforated losartan 100 MG Oral Tab Take 1 tablet (100 mg total) by mouth daily. Disp: 90 tablet Rfl: 4   Levothyroxine Sodium 100 MCG Oral Tab Take 1 tablet (100 mcg total) by mouth daily.  Disp: 90 tablet Rfl: 4   AmLODIPine Besylate 10 MG Oral Tab Take 1 tablet (10 BP: 100/76 100/76 90/69 95/74   Pulse: 96 95 99 105   Resp: 30 30 30 30   Temp:       TempSrc:       SpO2: 94% 95% 96% 95%        Anesthesia ROS/Med Hx and Physical Exam     Patient summary reviewed and Nursing notes reviewed    No history of anesthetic co

## 2018-03-02 ENCOUNTER — APPOINTMENT (OUTPATIENT)
Dept: GENERAL RADIOLOGY | Facility: HOSPITAL | Age: 58
DRG: 853 | End: 2018-03-02
Attending: INTERNAL MEDICINE
Payer: COMMERCIAL

## 2018-03-02 ENCOUNTER — APPOINTMENT (OUTPATIENT)
Dept: GENERAL RADIOLOGY | Facility: HOSPITAL | Age: 58
DRG: 853 | End: 2018-03-02
Attending: SURGERY
Payer: COMMERCIAL

## 2018-03-02 LAB
ALBUMIN SERPL BCP-MCNC: 3 G/DL (ref 3.5–4.8)
ALBUMIN/GLOB SERPL: 1.4 {RATIO} (ref 1–2)
ALP SERPL-CCNC: 24 U/L (ref 32–100)
ALT SERPL-CCNC: 35 U/L (ref 14–54)
ANION GAP SERPL CALC-SCNC: 3 MMOL/L (ref 0–18)
APTT PPP: 37.5 SECONDS (ref 23.2–35.3)
AST SERPL-CCNC: 64 U/L (ref 15–41)
BASOPHILS # BLD: 0 K/UL (ref 0–0.2)
BASOPHILS NFR BLD: 0 %
BILIRUB DIRECT SERPL-MCNC: 0.3 MG/DL (ref 0–0.2)
BILIRUB SERPL-MCNC: 1.5 MG/DL (ref 0.3–1.2)
BLOOD TYPE BARCODE: 9500
BUN SERPL-MCNC: 30 MG/DL (ref 8–20)
BUN/CREAT SERPL: 20 (ref 10–20)
CALCIUM SERPL-MCNC: 8.1 MG/DL (ref 8.5–10.5)
CHLORIDE SERPL-SCNC: 115 MMOL/L (ref 95–110)
CO2 SERPL-SCNC: 25 MMOL/L (ref 22–32)
CREAT SERPL-MCNC: 1.5 MG/DL (ref 0.5–1.5)
EOSINOPHIL # BLD: 0 K/UL (ref 0–0.7)
EOSINOPHIL NFR BLD: 1 %
ERYTHROCYTE [DISTWIDTH] IN BLOOD BY AUTOMATED COUNT: 13.8 % (ref 11–15)
GLOBULIN PLAS-MCNC: 2.1 G/DL (ref 2.5–3.7)
GLUCOSE SERPL-MCNC: 141 MG/DL (ref 70–99)
HCT VFR BLD AUTO: 39.6 % (ref 35–48)
HGB BLD-MCNC: 13.2 G/DL (ref 12–16)
INR BLD: 1.6 (ref 0.9–1.2)
LIPASE SERPL-CCNC: 15 U/L (ref 22–51)
LYMPHOCYTES # BLD: 0.4 K/UL (ref 1–4)
LYMPHOCYTES NFR BLD: 12 %
MAGNESIUM SERPL-MCNC: 1.9 MG/DL (ref 1.8–2.5)
MCH RBC QN AUTO: 31.7 PG (ref 27–32)
MCHC RBC AUTO-ENTMCNC: 33.3 G/DL (ref 32–37)
MCV RBC AUTO: 94.9 FL (ref 80–100)
MONOCYTES # BLD: 0.1 K/UL (ref 0–1)
MONOCYTES NFR BLD: 4 %
MRSA DNA SPEC QL NAA+PROBE: NEGATIVE
NEUTROPHILS # BLD AUTO: 2.9 K/UL (ref 1.8–7.7)
NEUTROPHILS NFR BLD: 83 %
OSMOLALITY UR CALC.SUM OF ELEC: 305 MOSM/KG (ref 275–295)
PHOSPHATE SERPL-MCNC: 3 MG/DL (ref 2.4–4.7)
PLATELET # BLD AUTO: 154 K/UL (ref 140–400)
PMV BLD AUTO: 9.3 FL (ref 7.4–10.3)
POTASSIUM SERPL-SCNC: 4.4 MMOL/L (ref 3.3–5.1)
PROT SERPL-MCNC: 5.1 G/DL (ref 5.9–8.4)
PROTHROMBIN TIME: 18.7 SECONDS (ref 11.8–14.5)
RBC # BLD AUTO: 4.17 M/UL (ref 3.7–5.4)
SODIUM SERPL-SCNC: 143 MMOL/L (ref 136–144)
WBC # BLD AUTO: 3.5 K/UL (ref 4–11)

## 2018-03-02 PROCEDURE — 71045 X-RAY EXAM CHEST 1 VIEW: CPT | Performed by: SURGERY

## 2018-03-02 PROCEDURE — 71045 X-RAY EXAM CHEST 1 VIEW: CPT | Performed by: INTERNAL MEDICINE

## 2018-03-02 PROCEDURE — 99233 SBSQ HOSP IP/OBS HIGH 50: CPT | Performed by: INTERNAL MEDICINE

## 2018-03-02 RX ORDER — FUROSEMIDE 10 MG/ML
20 INJECTION INTRAMUSCULAR; INTRAVENOUS
Status: COMPLETED | OUTPATIENT
Start: 2018-03-02 | End: 2018-03-08

## 2018-03-02 RX ORDER — SODIUM CHLORIDE 9 MG/ML
INJECTION, SOLUTION INTRAVENOUS
Status: DISPENSED
Start: 2018-03-02 | End: 2018-03-03

## 2018-03-02 RX ORDER — 0.9 % SODIUM CHLORIDE 0.9 %
VIAL (ML) INJECTION
Status: COMPLETED
Start: 2018-03-02 | End: 2018-03-02

## 2018-03-02 RX ORDER — 0.9 % SODIUM CHLORIDE 0.9 %
VIAL (ML) INJECTION
Status: DISPENSED
Start: 2018-03-02 | End: 2018-03-03

## 2018-03-02 NOTE — PLAN OF CARE
Problem: CARDIOVASCULAR - ADULT  Goal: Maintains optimal cardiac output and hemodynamic stability  INTERVENTIONS:  - Monitor vital signs, rhythm, and trends  - Monitor for bleeding, hypotension and signs of decreased cardiac output  - Evaluate effectivenes nonpharmacologic comfort measures as appropriate  - Advance diet as tolerated, if ordered  - Obtain nutritional consult as needed  - Evaluate fluid balance  Outcome: Not Progressing  NG to LIS. Patient with one episode of nausea, zofran given.   Goal: Maint

## 2018-03-02 NOTE — PAYOR COMM NOTE
Admit Orders     Start     Ordered    03/01/18 1213  Admit to inpatient Once  ([address])  Once     Ordering Provider:  Marvin Erickson MD   Question Answer Comment   Diagnosis TIMMY (acute kidney injury) [facility])    Level of Care ICU of the situation.     Past Medical History:   Diagnosis Date   • Broken wrist 2012    per [employer]:  \"Management:  orif left wrist\"   • Duodenal ulcer    • GI bleed    • Hypothyroidism    • Osteoarthritis of left hip 2010   • Unspecified essential hyperten mass   Musculoskeletal: She exhibits no edema or tenderness. Neurological: She is alert and oriented to person, place, and time. Moving all extremities sponaneously   Skin: She is diaphoretic. There is pallor.    Cold and pale extremities   Psychiatric: and location was confirmed prior to initiation. All elements of maximal sterile barrier technique were utilized. Sterile prep and drape was performed and appropriate landmarks were palpated. Ultrasound guidance was performed.   A cordis central line was ACTIVATED   Collection Time: 03/01/18  5:55 AM   Result Value Ref Range   PTT 23.2 23.2 - 35.3 seconds   -PROTHROMBIN TIME (PT)   Collection Time: 03/01/18  5:55 AM   Result Value Ref Range   PT 14.2 11.8 - 14.5 seconds   INR 1.1 0.9 - 1.2   -CBC W/ DIFFER UNIT RH NEG    Product Status Emergency Released    Expiration Date [id number]    Blood Type Barcode 9500    -PREPARE PLATELETS   Collection Time: 03/01/18  6:34 AM   Result Value Ref Range   Blood Product [id number]    Unit Number [id number]    UNI -18.1 (L) -2.0 - 2.0 mmol/L   ABG O2 Saturation 91.0 (L) 94.0 - 100.0 %   Oxygen Content 18.0 15.0 - 23.0 Vol%   Blood Gas P-50 32 (H) 24 - 28 mmHg   Oxygen Delivery Device Nasal Canula    L/M 4.00 L/min   Modified Allens Test Not Applicable    Puncture Ch Perforated sigmoid diverticulitis is also a consideration but considered less likely. Surgical assessment is recommended. 2. Marked fatty liver/hepatomegaly. 3. Small to moderate hiatal hernia. 4. Gastroduodenal artery embolization changes.  5. Assessment o persistently hypotensive - 4th L ordered  - levophed ordered  - discussed with Dr. Kavitha Lam - informed me of perforation in abdomen/pelvis - possible sources include gastric ulcer and possibly distal small bowel  - discussed with Dr. Leanne Galindo the need for surg disposition on file for this visit. There is no disposition time on file for this visit. Follow-up:  No follow-up provider specified.       Medications Prescribed:  Current Discharge Medication List                Signed by Bekah Ojeda MD on 3/1/2018 Osteoarthritis of left hip 2010   • Unspecified essential hypertension        Past Surgical History  Past Surgical History:  05-: ELECTROCARDIOGRAM, COMPLETE      Comment: Scanned to Media Tab - Date of Service                05-  2010: HIP R to auscultation bilaterally, no wheezing, rales, rhonchi, crackles  GI: Abdomen rigid, guarding present, diffuse tenderness to palpation most pronounced in bilateral lower quadrants  Extremities: no clubbing, cyanosis, edema  Neurologic: no gross motor def on recent abdomen/pelvis CT performed earlier today. Xr Chest Ap Portable  (cpt=71045)    Result Date: 3/1/2018  PROCEDURE: XR CHEST AP PORTABLE (CPT=71010) TIME: 7:08 AM.   COMPARISON: [facility], XR CHEST PORTABLE, 1/31/2016, 5:48. technique for dose reduction was used. FINDINGS: Exam quality is moderately degraded due to motion artifact. PULMONARY ARTERIES:   No pulmonary embolus in the central, main or lobar pulmonary arteries.  Nondiagnostic in the segmental and subsegmental pul Approved by (CST): Betty Marina MD on 3/01/2018 at 7:44          CONCLUSION: Moderately motion degraded. No pulmonary embolus in the central, main or lobar pulmonary arteries.  Nondiagnostic in the segmental and subsegmental pulmonary arteries due to respi pneumoperitoneum. Moderate to large volume intra-abdominal ascites is also noted. There is a punctate focus of air along the greater curvature of the stomach (series 15, image 86), which may reflect a gastric ulcer.  Gastroduodenal artery region embolizatio assessment is recommended. 2. Marked fatty liver/hepatomegaly. 3. Small to moderate hiatal hernia. 4. Gastroduodenal artery embolization changes.  5. Assessment of the pelvic structures is limited secondary to streak artifact from bilateral hip arthroplasti 9:08 AM                  MEDICATIONS ADMINISTERED IN LAST 1 DAY:  Albumin Human (ALBUMINAR) 5 % solution     Date Action Dose Route User    3/1/2018 1104 New Bag (none) Intravenous Effie Hatchet, CRNA    3/1/2018 1046 New Bag (none) Intravenous Eugene User    3/1/2018 1047 Given 6000 Units Intravenous (Abdominal Tissue) Brea Cuba MD      Lidocaine HCl (PF) (XYLOCAINE) 1 % injection SOLN     Date Action Dose Route User    3/1/2018 1005 Given 50 mg Intravenous [place] PeerELAYNE mcg/min Intravenous Leila Lee RN    3/1/2018 1431 Rate/Dose Change 16 mcg/min Intravenous Keiko Zuluaga RN    3/1/2018 1402 Rate/Dose Change 14 mcg/min Intravenous Keiko Zuluaga RN    3/1/2018 1320 Rate/Dose Change 10 mcg/min Intravenous Daniel M, CRNA      Sodium Chloride 0.9 % solution     Date Action Dose Route User    3/1/2018 1530 Given 10 mL (none) Felix Simms RN      Sodium Chloride 0.9 % solution     Date Action Dose Route User    3/1/2018 1634 Given 10 mL (none) Thor Hitchcock, Darylene Rod Foster [place], ELAYNE        REPORT OF CONSULTATION     REFERRING PHYSICIAN:  Dr. Javier Pacheco, emergency department physician and Dr. Hayder Abdul, intensivist pulmonologist.      REASON FOR CONSULTATION:  Perforated viscus.     HISTORY OF PRESENT ILLNES with this history most likely we will entertain first the diagnosis of the perforated ulcer. The patient, at this point, has been resuscitated with several liters of saline and, as stated above, 1 unit of blood.   Her blood pressure has come to 100/70, her

## 2018-03-02 NOTE — PROGRESS NOTES
[facility]     Progress Note        Burt Jurist Patient Status:  Inpatient    1960 MRN [id number]   [facility] 2W/SW Attending Neisha Ray,    Hosp Day # 1 PCP Kyle Zepeda MD       Subjective:   Patient s injection 2 mg 2 mg Intravenous Q2H PRN   Or      morphINE sulfate (PF) 4 MG/ML injection 4 mg 4 mg Intravenous Q2H PRN   Or      morphINE sulfate (PF) 4 MG/ML injection 8 mg 8 mg Intravenous Q2H PRN   ondansetron HCl (ZOFRAN) injection 4 mg 4 mg Intraveno Portable  (cpt=71045)    Result Date: 3/2/2018  CONCLUSION:  1. Worsening bibasilar consolidation and effusions. Can't exclude underlying inflammatory process. Status post extubation without pneumothorax.          Xr Chest Ap Portable  (cpt=71045)    Result compatible with perforated viscus including extensive pneumoperitoneum and moderate to large volume slightly complex ascites.  Exact source of perforation is difficult to detail given technical limitations of this examination and absence of oral contrast. P Underwent repair of perforated gastric ulcer on 2/1/2018.    -Tolerating extubation but significant oxygen requirements on high flow oxygen. Chest x-ray with evidence of bilateral effusions and possible lower lobe consolidation seen.   Significant fluid ba

## 2018-03-02 NOTE — PROGRESS NOTES
Called to pt's room at [phone] by other RN, pt self-d/d'c R rad art line, NGT, PIV, O2 sat monitor. Hematoma R radial site, pressure held, hemostasis obtained, dressing applied. On review of monitor lost BP reading at 1150.  Pt said \"this is all a fraud, no o

## 2018-03-02 NOTE — PROGRESS NOTES
[facility]    Progress Note    Zachary Ortez Patient Status:  Inpatient    1960 MRN [id number]   [facility] 2W/SW Attending Leonard Ballard,    Hosp Day # 1 PCP Gardenia Serra MD       Subjective:   No unusual com HCT 39.6 03/02/2018    03/02/2018   CREATSERUM 1.50 03/02/2018   BUN 30 03/02/2018    03/02/2018   K 4.4 03/02/2018    03/02/2018   CO2 25 03/02/2018   MG 1.9 03/02/2018   PHOS 3.0 03/02/2018    03/02/2018   BILT 1.5 03/02/2018 Bilateral lower lobe air space opacities suggestive of atelectasis. Underlying pneumonia is felt to be less likely but is also in the differential.  Pneumoperitoneum is partially seen and is better characterized on CT abdomen pelvis performed concurrently. MD  3/2/2018

## 2018-03-02 NOTE — PLAN OF CARE
Problem: Patient/Family Goals  Goal: Patient/Family Long Term Goal  Patient's Long Term Goal: Heal well from surgery    Interventions:  - assess drains per shift as prn  - promote early ambulation  - See additional Care Plan goals for specific intervention ADULT  Goal: Minimal or absence of nausea and vomiting  INTERVENTIONS:  - Maintain adequate hydration with IV or PO as ordered and tolerated  - Nasogastric tube to low intermittent suction as ordered  - Evaluate effectiveness of ordered antiemetic medicati goal  INTERVENTIONS:  - Encourage pt to monitor pain and request assistance  - Assess pain using appropriate pain scale  - Administer analgesics based on type and severity of pain and evaluate response  - Implement non-pharmacological measures as appropria

## 2018-03-02 NOTE — PLAN OF CARE
Problem: Patient/Family Goals  Goal: Patient/Family Long Term Goal  Patient's Long Term Goal: Heal well from surgery    Interventions:  - assess drains per shift as prn  - promote early ambulation  - See additional Care Plan goals for specific intervention

## 2018-03-02 NOTE — DIETARY NOTE
NUTRITION - INITIAL ASSESSMENT    Pt is at moderate nutrition risk. Pt does not meet malnutrition criteria.         RECOMMENDATIONS TO MD:  Diet advance per surgeon when appropriate     NUTRITION DIAGNOSIS/PROBLEM:  Inadequate protein energy intake relate setting or provider: monitor plans    ADMITTING DIAGNOSIS:   Shock ([facility] [address] [R57.9]  Low bicarbonate [E87.8]  TIMMY (acute kidney injury) ([facility] [address] [N17.9]  Perforated bowel ([facility] [address] [K63.1]    PERTINENT PAST MEDICAL HISTORY:   Past Medical History:   Diagnosis Date mg Intravenous Q24H   • Albumin Human  25 g Intravenous Once   • vancomycin  15 mg/kg Intravenous Q24H       LABS: reviewed    Recent Labs   03/01/18  0555 03/01/18  1200 03/01/18  1606 03/02/18  0520   *  --   --  141*   BUN 50*  --   --  30*   CRE

## 2018-03-02 NOTE — PROGRESS NOTES
[facility] dosing service    Initial Pharmacokinetic Consult for Vancomycin Dosing     Gunnar Nava is a 61year old female who is being treated for bacteremia. Pharmacy has been asked to dose Vancomycin by Dr. Deya Veras. She has No Known Allergies.     Oth cocci in chains N/A       Based on the above:    1. This patient will receive a loading dose of Vancomycin 2g IVPB (25mg/kg, capped at 2g) x 1 dose, followed by 1250mg every 24 hours. 2.  Vancomycin trough level(s) prior to 3rd dose.    Goal trough level

## 2018-03-03 ENCOUNTER — APPOINTMENT (OUTPATIENT)
Dept: GENERAL RADIOLOGY | Facility: HOSPITAL | Age: 58
DRG: 853 | End: 2018-03-03
Attending: INTERNAL MEDICINE
Payer: COMMERCIAL

## 2018-03-03 LAB
ALBUMIN SERPL BCP-MCNC: 2.6 G/DL (ref 3.5–4.8)
ALBUMIN/GLOB SERPL: 0.9 {RATIO} (ref 1–2)
ALP SERPL-CCNC: 45 U/L (ref 32–100)
ALT SERPL-CCNC: 35 U/L (ref 14–54)
ANION GAP SERPL CALC-SCNC: 8 MMOL/L (ref 0–18)
AST SERPL-CCNC: 40 U/L (ref 15–41)
BASOPHILS # BLD: 0 K/UL (ref 0–0.2)
BASOPHILS NFR BLD: 0 %
BILIRUB SERPL-MCNC: 1.2 MG/DL (ref 0.3–1.2)
BUN SERPL-MCNC: 31 MG/DL (ref 8–20)
BUN/CREAT SERPL: 20.4 (ref 10–20)
CALCIUM SERPL-MCNC: 8.5 MG/DL (ref 8.5–10.5)
CHLORIDE SERPL-SCNC: 112 MMOL/L (ref 95–110)
CO2 SERPL-SCNC: 26 MMOL/L (ref 22–32)
CREAT SERPL-MCNC: 1.52 MG/DL (ref 0.5–1.5)
EOSINOPHIL # BLD: 0 K/UL (ref 0–0.7)
EOSINOPHIL NFR BLD: 0 %
ERYTHROCYTE [DISTWIDTH] IN BLOOD BY AUTOMATED COUNT: 13.9 % (ref 11–15)
GLOBULIN PLAS-MCNC: 2.8 G/DL (ref 2.5–3.7)
GLUCOSE SERPL-MCNC: 111 MG/DL (ref 70–99)
HCT VFR BLD AUTO: 35.4 % (ref 35–48)
HGB BLD-MCNC: 11.8 G/DL (ref 12–16)
LYMPHOCYTES # BLD: 0.6 K/UL (ref 1–4)
LYMPHOCYTES NFR BLD: 6 %
MCH RBC QN AUTO: 31.6 PG (ref 27–32)
MCHC RBC AUTO-ENTMCNC: 33.4 G/DL (ref 32–37)
MCV RBC AUTO: 94.8 FL (ref 80–100)
MONOCYTES # BLD: 0 K/UL (ref 0–1)
MONOCYTES NFR BLD: 0 %
NEUTROPHILS # BLD AUTO: 8.7 K/UL (ref 1.8–7.7)
NEUTROPHILS NFR BLD: 56 %
NEUTS BAND NFR BLD: 38 %
OSMOLALITY UR CALC.SUM OF ELEC: 309 MOSM/KG (ref 275–295)
PLATELET # BLD AUTO: 128 K/UL (ref 140–400)
PMV BLD AUTO: 9.4 FL (ref 7.4–10.3)
POTASSIUM SERPL-SCNC: 3.6 MMOL/L (ref 3.3–5.1)
PROT SERPL-MCNC: 5.4 G/DL (ref 5.9–8.4)
RBC # BLD AUTO: 3.73 M/UL (ref 3.7–5.4)
SODIUM SERPL-SCNC: 146 MMOL/L (ref 136–144)
VANCOMYCIN TROUGH SERPL-MCNC: 12 MCG/ML (ref 10–20)
WBC # BLD AUTO: 9.2 K/UL (ref 4–11)

## 2018-03-03 PROCEDURE — 99233 SBSQ HOSP IP/OBS HIGH 50: CPT | Performed by: INTERNAL MEDICINE

## 2018-03-03 PROCEDURE — 71045 X-RAY EXAM CHEST 1 VIEW: CPT | Performed by: INTERNAL MEDICINE

## 2018-03-03 RX ORDER — SODIUM CHLORIDE 9 MG/ML
INJECTION, SOLUTION INTRAVENOUS
Status: COMPLETED
Start: 2018-03-03 | End: 2018-03-03

## 2018-03-03 RX ORDER — POTASSIUM CHLORIDE 29.8 MG/ML
40 INJECTION INTRAVENOUS ONCE
Status: COMPLETED | OUTPATIENT
Start: 2018-03-03 | End: 2018-03-03

## 2018-03-03 RX ORDER — 0.9 % SODIUM CHLORIDE 0.9 %
VIAL (ML) INJECTION
Status: COMPLETED
Start: 2018-03-03 | End: 2018-03-03

## 2018-03-03 NOTE — PROGRESS NOTES
[facility]    Progress Note    Mick Finch Patient Status:  Inpatient    1960 MRN [id number]   [facility] 2W/SW Attending Cass Pete,    Hosp Day # 2 PCP Luz Abarca MD     Subjective:     Constitution Lab Results  Component Value Date   WBC 9.2 03/03/2018   HGB 11.8 (L) 03/03/2018   HCT 35.4 03/03/2018    (L) 03/03/2018   CREATSERUM 1.52 (H) 03/03/2018   BUN 31 (H) 03/03/2018    (H) 03/03/2018   K 3.6 03/03/2018    (H) 03/03/2018

## 2018-03-03 NOTE — PLAN OF CARE
Problem: CARDIOVASCULAR - ADULT  Goal: Maintains optimal cardiac output and hemodynamic stability  INTERVENTIONS:  - Monitor vital signs, rhythm, and trends  - Monitor for bleeding, hypotension and signs of decreased cardiac output  - Evaluate effectivenes needed  - Evaluate fluid balance   Outcome: Progressing  No nausea or vomiting noted. IVF. NG to LIS.      Problem: SKIN/TISSUE INTEGRITY - ADULT  Goal: Incision(s), wounds(s) or drain site(s) healing without S/S of infection  INTERVENTIONS:  - Assess and d Consider OT/PT consult to assist with strengthening/mobility  - Encourage toileting schedule   Outcome: Progressing  CWIA protocol followed. Bed locked, in lowest position, call light within reach. Bed alarm in place. Free from fall/injury at this time.  Wi

## 2018-03-03 NOTE — PLAN OF CARE
Pt lost her cell phone. I saw her with it while she was sitting in her recliner. When we put her back to bed at approx 1630 it was no where to be found. I searched the chair with a flashlight and couldn't find it.  Her  arabella was here and used his charla

## 2018-03-03 NOTE — PLAN OF CARE
GASTROINTESTINAL - ADULT    • Maintains or returns to baseline bowel function Not Progressing    • Maintains adequate nutritional intake (undernourished) Not Progressing        Patient/Family Goals    • Patient/Family Long Term Goal Not Progressing

## 2018-03-03 NOTE — PROGRESS NOTES
[facility] HOSP - [facility]    Progress Note    Jose Yun Patient Status:  Inpatient    1960 MRN [id number]   [facility] 2W/SW Attending Alysa Arriaza,    Hosp Day # 2 PCP Rossy Boykin MD       Subjective:   Tyler henry Lab Results  Component Value Date   WBC 9.2 03/03/2018   HGB 11.8 03/03/2018   HCT 35.4 03/03/2018    03/03/2018   CREATSERUM 1.52 03/03/2018   BUN 31 03/03/2018    03/03/2018   K 3.6 03/03/2018    03/03/2018   CO2 26 03/03/2018   GL

## 2018-03-04 ENCOUNTER — APPOINTMENT (OUTPATIENT)
Dept: GENERAL RADIOLOGY | Facility: HOSPITAL | Age: 58
DRG: 853 | End: 2018-03-04
Attending: SURGERY
Payer: COMMERCIAL

## 2018-03-04 LAB
ANION GAP SERPL CALC-SCNC: 7 MMOL/L (ref 0–18)
BASOPHILS # BLD: 0 K/UL (ref 0–0.2)
BASOPHILS NFR BLD: 0 %
BUN SERPL-MCNC: 30 MG/DL (ref 8–20)
BUN/CREAT SERPL: 22.2 (ref 10–20)
CALCIUM SERPL-MCNC: 8.6 MG/DL (ref 8.5–10.5)
CHLORIDE SERPL-SCNC: 114 MMOL/L (ref 95–110)
CO2 SERPL-SCNC: 27 MMOL/L (ref 22–32)
CREAT SERPL-MCNC: 1.35 MG/DL (ref 0.5–1.5)
EOSINOPHIL # BLD: 0 K/UL (ref 0–0.7)
EOSINOPHIL NFR BLD: 0 %
ERYTHROCYTE [DISTWIDTH] IN BLOOD BY AUTOMATED COUNT: 14.1 % (ref 11–15)
GLUCOSE SERPL-MCNC: 101 MG/DL (ref 70–99)
HCT VFR BLD AUTO: 33.5 % (ref 35–48)
HGB BLD-MCNC: 11.1 G/DL (ref 12–16)
LYMPHOCYTES # BLD: 0.6 K/UL (ref 1–4)
LYMPHOCYTES NFR BLD: 6 %
MCH RBC QN AUTO: 31.7 PG (ref 27–32)
MCHC RBC AUTO-ENTMCNC: 33.1 G/DL (ref 32–37)
MCV RBC AUTO: 95.8 FL (ref 80–100)
MONOCYTES # BLD: 0.3 K/UL (ref 0–1)
MONOCYTES NFR BLD: 3 %
NEUTROPHILS # BLD AUTO: 8.9 K/UL (ref 1.8–7.7)
NEUTROPHILS NFR BLD: 83 %
NEUTS BAND NFR BLD: 8 %
OSMOLALITY UR CALC.SUM OF ELEC: 312 MOSM/KG (ref 275–295)
PLATELET # BLD AUTO: 117 K/UL (ref 140–400)
PMV BLD AUTO: 9.6 FL (ref 7.4–10.3)
POTASSIUM SERPL-SCNC: 3.2 MMOL/L (ref 3.3–5.1)
POTASSIUM SERPL-SCNC: 3.2 MMOL/L (ref 3.3–5.1)
RBC # BLD AUTO: 3.5 M/UL (ref 3.7–5.4)
SODIUM SERPL-SCNC: 148 MMOL/L (ref 136–144)
WBC # BLD AUTO: 9.8 K/UL (ref 4–11)

## 2018-03-04 PROCEDURE — 99233 SBSQ HOSP IP/OBS HIGH 50: CPT | Performed by: INTERNAL MEDICINE

## 2018-03-04 PROCEDURE — 71045 X-RAY EXAM CHEST 1 VIEW: CPT | Performed by: SURGERY

## 2018-03-04 RX ORDER — POTASSIUM CHLORIDE 29.8 MG/ML
40 INJECTION INTRAVENOUS ONCE
Status: COMPLETED | OUTPATIENT
Start: 2018-03-04 | End: 2018-03-04

## 2018-03-04 RX ORDER — 0.9 % SODIUM CHLORIDE 0.9 %
VIAL (ML) INJECTION
Status: COMPLETED
Start: 2018-03-04 | End: 2018-03-04

## 2018-03-04 NOTE — PROGRESS NOTES
[facility]     Progress Note        Mechelle Wlaker Patient Status:  Inpatient    1960 MRN [id number]   [facility] 2W/SW Attending Raman Del Cid DO   Hosp Day # 3 PCP Bailey Heller MD       Subjective:   Patient s injection 4 mg 4 mg Intravenous Q10 Min PRN   morphINE sulfate (PF) 2 MG/ML injection 2 mg 2 mg Intravenous Q2H PRN   Or      morphINE sulfate (PF) 4 MG/ML injection 4 mg 4 mg Intravenous Q2H PRN   Or      morphINE sulfate (PF) 4 MG/ML injection 8 mg 8 mg consolidation/atelectasis. Can't exclude bibasilar pneumonia. Followup studies are advised. Xr Chest Ap Portable  (cpt=71045)    Result Date: 3/3/2018  CONCLUSION:  1. Slight decrease in the amount of left pleural effusion.  2. Persistent right pleu

## 2018-03-04 NOTE — PLAN OF CARE
Problem: Safety Risk - Non-Violent Restraints  Goal: Patient will remain free from self-harm  INTERVENTIONS:  - Apply the least restrictive restraint to prevent harm  - Notify patient and family of reasons restraints applied  - Assess for any contributing Progressing  VSS.     Problem: RESPIRATORY - ADULT  Goal: Achieves optimal ventilation and oxygenation  INTERVENTIONS:  - Assess for changes in respiratory status  - Assess for changes in mentation and behavior  - Position to facilitate oxygenation and mini ulcer development  - Assess and document skin integrity  - Assess and document dressing/incision, wound bed, drain sites and surrounding tissue  - Implement wound care per orders  - Initiate isolation precautions as appropriate  - Initiate Pressure Ulcer p

## 2018-03-04 NOTE — PROGRESS NOTES
[facility] dosing service    Follow-up Pharmacokinetic Consult for Vancomycin Dosing     Lynn Hunter is a 61year old female admitted on 3/1/18 who is being treated for bacteremia. Patient is on day 3 of Vancomycin 1.25 gm IV Q 24 hours.   Goal trough is Trough of 12 ug/mL, pharmacokinetics, weight and renal function)    2. Pharmacy will re-check Vancomycin trough levels prior to 3rd dose. Goal trough level 15-20 ug/mL. 3.  Pharmacy will need BUN/Scr daily while on Vancomycin to assess renal function.

## 2018-03-04 NOTE — PROGRESS NOTES
[facility]    Progress Note    Hitesh Lopez Patient Status:  Inpatient    1960 MRN [id number]   [facility] 2W/SW Attending Usha Arevalo DO   Hosp Day # 3 PCP Gayla Navarro MD       Subjective:   Sedated No abd CREATSERUM 1.35 03/04/2018   BUN 30 03/04/2018    03/04/2018   K 3.2 03/04/2018   K 3.2 03/04/2018    03/04/2018   CO2 27 03/04/2018    03/04/2018   CA 8.6 03/04/2018       Xr Chest Ap Portable  (cpt=71045)    Result Date: 3/4/2018  CO

## 2018-03-04 NOTE — PLAN OF CARE
Safety Risk - Non-Violent Restraints    • Patient will remain free from self-harm Not Progressing        Restless and confused at times, atempts to reach for tubes and lines, restraints maintained for tube and line safety.

## 2018-03-04 NOTE — PROGRESS NOTES
precidex gtt started as pt was extremely anxious today with attempts at pulling all monitoring wires etc. I was using ativan but it was too frequent and not helping sufficiently. Even with restraints she was able to fight against them.  Much calmer on the gt

## 2018-03-05 LAB
ALBUMIN SERPL BCP-MCNC: 2.4 G/DL (ref 3.5–4.8)
ALBUMIN/GLOB SERPL: 0.7 {RATIO} (ref 1–2)
ALP SERPL-CCNC: 61 U/L (ref 32–100)
ALT SERPL-CCNC: 28 U/L (ref 14–54)
ANION GAP SERPL CALC-SCNC: 8 MMOL/L (ref 0–18)
AST SERPL-CCNC: 21 U/L (ref 15–41)
BASOPHILS # BLD: 0 K/UL (ref 0–0.2)
BASOPHILS NFR BLD: 0 %
BILIRUB SERPL-MCNC: 1.1 MG/DL (ref 0.3–1.2)
BUN SERPL-MCNC: 30 MG/DL (ref 8–20)
BUN/CREAT SERPL: 20.7 (ref 10–20)
CALCIUM SERPL-MCNC: 8.5 MG/DL (ref 8.5–10.5)
CHLORIDE SERPL-SCNC: 116 MMOL/L (ref 95–110)
CO2 SERPL-SCNC: 26 MMOL/L (ref 22–32)
CREAT SERPL-MCNC: 1.45 MG/DL (ref 0.5–1.5)
EOSINOPHIL # BLD: 0 K/UL (ref 0–0.7)
EOSINOPHIL NFR BLD: 0 %
ERYTHROCYTE [DISTWIDTH] IN BLOOD BY AUTOMATED COUNT: 14.4 % (ref 11–15)
GLOBULIN PLAS-MCNC: 3.4 G/DL (ref 2.5–3.7)
GLUCOSE SERPL-MCNC: 113 MG/DL (ref 70–99)
HCT VFR BLD AUTO: 35.2 % (ref 35–48)
HGB BLD-MCNC: 11.5 G/DL (ref 12–16)
LACTATE SERPL-SCNC: 0.7 MMOL/L (ref 0.5–2.2)
LYMPHOCYTES # BLD: 0.7 K/UL (ref 1–4)
LYMPHOCYTES NFR BLD: 8 %
MAGNESIUM SERPL-MCNC: 1.7 MG/DL (ref 1.8–2.5)
MCH RBC QN AUTO: 31.1 PG (ref 27–32)
MCHC RBC AUTO-ENTMCNC: 32.6 G/DL (ref 32–37)
MCV RBC AUTO: 95.4 FL (ref 80–100)
METAMYELOCYTES # BLD MANUAL: 0.34 K/UL
MONOCYTES # BLD: 0.6 K/UL (ref 0–1)
MONOCYTES NFR BLD: 7 %
MYELOCYTES NFR BLD: 4 %
NEUTROPHILS # BLD AUTO: 6.9 K/UL (ref 1.8–7.7)
NEUTROPHILS NFR BLD: 74 %
NEUTS BAND NFR BLD: 7 %
OSMOLALITY UR CALC.SUM OF ELEC: 317 MOSM/KG (ref 275–295)
PLATELET # BLD AUTO: 132 K/UL (ref 140–400)
PMV BLD AUTO: 9.9 FL (ref 7.4–10.3)
POTASSIUM SERPL-SCNC: 3.3 MMOL/L (ref 3.3–5.1)
POTASSIUM SERPL-SCNC: 3.4 MMOL/L (ref 3.3–5.1)
POTASSIUM SERPL-SCNC: 3.4 MMOL/L (ref 3.3–5.1)
PROT SERPL-MCNC: 5.8 G/DL (ref 5.9–8.4)
RBC # BLD AUTO: 3.69 M/UL (ref 3.7–5.4)
SODIUM SERPL-SCNC: 150 MMOL/L (ref 136–144)
WBC # BLD AUTO: 8.5 K/UL (ref 4–11)

## 2018-03-05 PROCEDURE — 99233 SBSQ HOSP IP/OBS HIGH 50: CPT | Performed by: INTERNAL MEDICINE

## 2018-03-05 RX ORDER — POTASSIUM CHLORIDE 29.8 MG/ML
40 INJECTION INTRAVENOUS ONCE
Status: COMPLETED | OUTPATIENT
Start: 2018-03-05 | End: 2018-03-05

## 2018-03-05 RX ORDER — 0.9 % SODIUM CHLORIDE 0.9 %
VIAL (ML) INJECTION
Status: COMPLETED
Start: 2018-03-05 | End: 2018-03-05

## 2018-03-05 RX ORDER — MAGNESIUM SULFATE HEPTAHYDRATE 40 MG/ML
2 INJECTION, SOLUTION INTRAVENOUS ONCE
Status: COMPLETED | OUTPATIENT
Start: 2018-03-05 | End: 2018-03-05

## 2018-03-05 NOTE — PLAN OF CARE
Problem: Safety Risk - Non-Violent Restraints  Goal: Patient will remain free from self-harm  INTERVENTIONS:  - Apply the least restrictive restraint to prevent harm  - Notify patient and family of reasons restraints applied  - Assess for any contributing Assess for changes in mentation and behavior  - Position to facilitate oxygenation and minimize respiratory effort  - Oxygen supplementation based on oxygen saturation or ABGs  - Provide Smoking Cessation handout, if applicable  - Encourage broncho-pulmona Progressing      Problem: SKIN/TISSUE INTEGRITY - ADULT  Goal: Incision(s), wounds(s) or drain site(s) healing without S/S of infection  INTERVENTIONS:  - Assess and document risk factors for pressure ulcer development  - Assess and document skin integrity and integrated in the patient's plan of care  Interventions:  - What would you like us to know as we care for you?   - Provide timely, complete, and accurate information to patient/family  - Incorporate patient and family knowledge, values, beliefs, and cu

## 2018-03-05 NOTE — PROGRESS NOTES
[facility] HOSP - [facility]    Progress Note    Alberto Cavazos Patient Status:  Inpatient    1960 MRN [id number]   [facility] 2W/SW Attending Yfn Owens,    Hosp Day # 4 PCP Javier Hodge MD       Subjective:    Otilio Aguayo 03/05/2018    03/05/2018   CO2 26 03/05/2018    03/05/2018   BILT 1.1 03/05/2018   AST 21 03/05/2018   ALT 28 03/05/2018   CA 8.5 03/05/2018   ALB 2.4 03/05/2018   TP 5.8 03/05/2018       Xr Chest Ap Portable  (cpt=71045)    Result Date: 3/4/2

## 2018-03-05 NOTE — CM/SW NOTE
Pt discussed during interdisciplinary rounds. Per RN, pt reported drinking heavily at baseline, usually a bottle per night. SW placed call to Psych Liaison/Dia [phone] to offer assistance with resources.  SW will remain available as other dc needs are

## 2018-03-05 NOTE — PROGRESS NOTES
Pulmonary/Critical Care Follow Up Note    HPI:   Helena Campa is a 61year old female with Patient presents with:  Abdomen/Flank Pain (GI/): +MIDABDOMINAL PAIN      PCP Erich Huerta MD  Admission Attending [facility] Day #4    [facility] mg, 4 mg, Intravenous, Q2H PRN **OR** morphINE sulfate (PF) 4 MG/ML injection 8 mg, 8 mg, Intravenous, Q2H PRN  •  ondansetron HCl (ZOFRAN) injection 4 mg, 4 mg, Intravenous, Q6H PRN  •  Metoclopramide HCl (REGLAN) injection 5 mg, 5 mg, Intravenous, Q6H SD  Shock state  Off pressors  SCR high but stable  Good UOP  Plan follow    3.   Acute hypoxemic respiratory failure  Extubated  Tolerating well  Plan O2 NC   IS    4.  Lactic acidosis  Resolved    5.  Acute renal failure  Better  Plan follow   No nephrotoxin

## 2018-03-06 ENCOUNTER — APPOINTMENT (OUTPATIENT)
Dept: LAB | Facility: HOSPITAL | Age: 58
DRG: 853 | End: 2018-03-06
Attending: INTERNAL MEDICINE
Payer: COMMERCIAL

## 2018-03-06 ENCOUNTER — APPOINTMENT (OUTPATIENT)
Dept: GENERAL RADIOLOGY | Facility: HOSPITAL | Age: 58
DRG: 853 | End: 2018-03-06
Attending: INTERNAL MEDICINE
Payer: COMMERCIAL

## 2018-03-06 LAB
ANION GAP SERPL CALC-SCNC: 10 MMOL/L (ref 0–18)
BASOPHILS # BLD: 0 K/UL (ref 0–0.2)
BASOPHILS NFR BLD: 0 %
BUN SERPL-MCNC: 29 MG/DL (ref 8–20)
BUN/CREAT SERPL: 18.1 (ref 10–20)
CALCIUM SERPL-MCNC: 8.8 MG/DL (ref 8.5–10.5)
CHLORIDE SERPL-SCNC: 111 MMOL/L (ref 95–110)
CO2 SERPL-SCNC: 26 MMOL/L (ref 22–32)
CREAT SERPL-MCNC: 1.6 MG/DL (ref 0.5–1.5)
EOSINOPHIL # BLD: 0.2 K/UL (ref 0–0.7)
EOSINOPHIL NFR BLD: 2 %
ERYTHROCYTE [DISTWIDTH] IN BLOOD BY AUTOMATED COUNT: 14 % (ref 11–15)
GLUCOSE SERPL-MCNC: 139 MG/DL (ref 70–99)
HCT VFR BLD AUTO: 38.1 % (ref 35–48)
HGB BLD-MCNC: 12.8 G/DL (ref 12–16)
LYMPHOCYTES # BLD: 1.1 K/UL (ref 1–4)
LYMPHOCYTES NFR BLD: 10 %
MAGNESIUM SERPL-MCNC: 2.3 MG/DL (ref 1.8–2.5)
MCH RBC QN AUTO: 31.6 PG (ref 27–32)
MCHC RBC AUTO-ENTMCNC: 33.6 G/DL (ref 32–37)
MCV RBC AUTO: 94.2 FL (ref 80–100)
METAMYELOCYTES # BLD MANUAL: 0.11 K/UL
MONOCYTES # BLD: 0.9 K/UL (ref 0–1)
MONOCYTES NFR BLD: 8 %
MYELOCYTES NFR BLD: 1 %
NEUTROPHILS # BLD AUTO: 8.9 K/UL (ref 1.8–7.7)
NEUTROPHILS NFR BLD: 76 %
NEUTS BAND NFR BLD: 3 %
OSMOLALITY UR CALC.SUM OF ELEC: 312 MOSM/KG (ref 275–295)
PHOSPHATE SERPL-MCNC: 3.4 MG/DL (ref 2.4–4.7)
PLATELET # BLD AUTO: 182 K/UL (ref 140–400)
PMV BLD AUTO: 10 FL (ref 7.4–10.3)
POTASSIUM SERPL-SCNC: 3.7 MMOL/L (ref 3.3–5.1)
RBC # BLD AUTO: 4.04 M/UL (ref 3.7–5.4)
SODIUM SERPL-SCNC: 147 MMOL/L (ref 136–144)
WBC # BLD AUTO: 11.3 K/UL (ref 4–11)

## 2018-03-06 PROCEDURE — 71045 X-RAY EXAM CHEST 1 VIEW: CPT | Performed by: INTERNAL MEDICINE

## 2018-03-06 PROCEDURE — 99233 SBSQ HOSP IP/OBS HIGH 50: CPT | Performed by: INTERNAL MEDICINE

## 2018-03-06 PROCEDURE — 74018 RADEX ABDOMEN 1 VIEW: CPT | Performed by: INTERNAL MEDICINE

## 2018-03-06 RX ORDER — 0.9 % SODIUM CHLORIDE 0.9 %
VIAL (ML) INJECTION
Status: COMPLETED
Start: 2018-03-06 | End: 2018-03-06

## 2018-03-06 RX ORDER — METOCLOPRAMIDE HYDROCHLORIDE 5 MG/ML
5 INJECTION INTRAMUSCULAR; INTRAVENOUS EVERY 6 HOURS
Status: DISCONTINUED | OUTPATIENT
Start: 2018-03-06 | End: 2018-03-10

## 2018-03-06 NOTE — PROGRESS NOTES
[facility]     Progress Note        Earnie Cons Patient Status:  Inpatient    1960 MRN [id number]   [facility] 2W/SW Attending Angelita Miller,    Hosp Day # 5 PCP Eliel Moreno MD       Subjective:   Patient s mg 2 mg Intravenous Q2H PRN   Or      morphINE sulfate (PF) 4 MG/ML injection 4 mg 4 mg Intravenous Q2H PRN   Or      morphINE sulfate (PF) 4 MG/ML injection 8 mg 8 mg Intravenous Q2H PRN   ondansetron HCl (ZOFRAN) injection 4 mg 4 mg Intravenous Q6H PRN state  3. Acute hypoxemic respiratory failure  4. Lactic acidosis, resolved  5. Acute renal failure  6. Leukopenia  7. Hypoalbuminemia  8. Hypothyroidism  9.   EtOH withdrawal     Plan   -Patient presents with evidence of severe abdominal pain and birgit

## 2018-03-06 NOTE — PLAN OF CARE
Problem: CARDIOVASCULAR - ADULT  Goal: Maintains optimal cardiac output and hemodynamic stability  INTERVENTIONS:  - Monitor vital signs, rhythm, and trends  - Monitor for bleeding, hypotension and signs of decreased cardiac output  - Evaluate effectivenes Progressing  IVF. No nausea/vomiting noted at this time.      Problem: SKIN/TISSUE INTEGRITY - ADULT  Goal: Incision(s), wounds(s) or drain site(s) healing without S/S of infection  INTERVENTIONS:  - Assess and document risk factors for pressure ulcer devel Encourage toileting schedule   Outcome: Progressing  Pt free from fall/injury at this time. Bed locked, in lowest position, call light within reach. Bed alarm engaged for safety. Will monitor.

## 2018-03-06 NOTE — PROGRESS NOTES
03/05/18 1500   Clinical Encounter Type   Visited With Patient   Routine Visit Introduction   Continue Visiting Yes   Referral From Nurse   Referral To    Patient Spiritual Encounters   Spiritual Assessment Completed 1   Spiritual Needs Pt. has

## 2018-03-06 NOTE — PLAN OF CARE
GASTROINTESTINAL - ADULT    • Maintains or returns to baseline bowel function Not Progressing        Patient/Family Goals    • Patient/Family Long Term Goal Not Progressing          CARDIOVASCULAR - ADULT    • Maintains optimal cardiac output and hemodynam

## 2018-03-06 NOTE — PLAN OF CARE
Patient will remain free from self-harm Completed    NGT removed, pt compliant in not pulling on lines/tubes, restraints d/c'd.   Maintains adequate nutritional intake (undernourished) Not Progressing    Pt started on floor stock clear liquids, tolerating j

## 2018-03-06 NOTE — PROGRESS NOTES
[facility]  Antimicrobial Stewardship De-escalation Recommendation Note    Florence Muniz is a 61year old female    Current Antimicrobial Medications  Anti-infectives       Start     Dose/Rate Route Frequency Ordered Stop    03/02/18 1000  Fluconazole in

## 2018-03-07 LAB
ALBUMIN SERPL BCP-MCNC: 2.3 G/DL (ref 3.5–4.8)
ALBUMIN/GLOB SERPL: 0.6 {RATIO} (ref 1–2)
ALP SERPL-CCNC: 69 U/L (ref 32–100)
ALT SERPL-CCNC: 21 U/L (ref 14–54)
ANION GAP SERPL CALC-SCNC: 11 MMOL/L (ref 0–18)
AST SERPL-CCNC: 18 U/L (ref 15–41)
BASOPHILS # BLD: 0 K/UL (ref 0–0.2)
BASOPHILS NFR BLD: 0 %
BILIRUB SERPL-MCNC: 1.3 MG/DL (ref 0.3–1.2)
BUN SERPL-MCNC: 26 MG/DL (ref 8–20)
BUN/CREAT SERPL: 14.7 (ref 10–20)
CALCIUM SERPL-MCNC: 8.3 MG/DL (ref 8.5–10.5)
CHLORIDE SERPL-SCNC: 111 MMOL/L (ref 95–110)
CO2 SERPL-SCNC: 23 MMOL/L (ref 22–32)
CREAT SERPL-MCNC: 1.77 MG/DL (ref 0.5–1.5)
EOSINOPHIL # BLD: 0 K/UL (ref 0–0.7)
EOSINOPHIL NFR BLD: 0 %
ERYTHROCYTE [DISTWIDTH] IN BLOOD BY AUTOMATED COUNT: 14.4 % (ref 11–15)
GLOBULIN PLAS-MCNC: 3.6 G/DL (ref 2.5–3.7)
GLUCOSE SERPL-MCNC: 129 MG/DL (ref 70–99)
HCT VFR BLD AUTO: 36.4 % (ref 35–48)
HGB BLD-MCNC: 12 G/DL (ref 12–16)
LYMPHOCYTES # BLD: 1.9 K/UL (ref 1–4)
LYMPHOCYTES NFR BLD: 11 %
MCH RBC QN AUTO: 31.3 PG (ref 27–32)
MCHC RBC AUTO-ENTMCNC: 33.1 G/DL (ref 32–37)
MCV RBC AUTO: 94.5 FL (ref 80–100)
MONOCYTES # BLD: 1 K/UL (ref 0–1)
MONOCYTES NFR BLD: 6 %
NEUTROPHILS # BLD AUTO: 14.4 K/UL (ref 1.8–7.7)
NEUTROPHILS NFR BLD: 77 %
NEUTS BAND NFR BLD: 6 %
OPIATES, UR, 6-ACETYLMORPHINE: <10 NG/ML
OPIATES, URINE, CODEINE: <20 NG/ML
OPIATES, URINE, HYDROCODONE: <20 NG/ML
OPIATES, URINE, HYDROMORPHONE: <20 NG/ML
OPIATES, URINE, MORPHINE: 494 NG/ML
OPIATES, URINE, NORHYDROCODONE: <20 NG/ML
OPIATES, URINE, NOROXYCODONE: <20 NG/ML
OPIATES, URINE, NOROXYMORPHONE: <20 NG/ML
OPIATES, URINE, OXYCODONE: <20 NG/ML
OPIATES, URINE, OXYMORPHONE: <20 NG/ML
OSMOLALITY UR CALC.SUM OF ELEC: 306 MOSM/KG (ref 275–295)
PLATELET # BLD AUTO: 283 K/UL (ref 140–400)
PMV BLD AUTO: 9.6 FL (ref 7.4–10.3)
POTASSIUM SERPL-SCNC: 3.2 MMOL/L (ref 3.3–5.1)
POTASSIUM SERPL-SCNC: 3.5 MMOL/L (ref 3.3–5.1)
PROT SERPL-MCNC: 5.9 G/DL (ref 5.9–8.4)
RBC # BLD AUTO: 3.85 M/UL (ref 3.7–5.4)
SODIUM SERPL-SCNC: 145 MMOL/L (ref 136–144)
WBC # BLD AUTO: 17.4 K/UL (ref 4–11)

## 2018-03-07 PROCEDURE — 99233 SBSQ HOSP IP/OBS HIGH 50: CPT | Performed by: HOSPITALIST

## 2018-03-07 PROCEDURE — 99233 SBSQ HOSP IP/OBS HIGH 50: CPT | Performed by: INTERNAL MEDICINE

## 2018-03-07 RX ORDER — POTASSIUM CHLORIDE 29.8 MG/ML
40 INJECTION INTRAVENOUS ONCE
Status: COMPLETED | OUTPATIENT
Start: 2018-03-07 | End: 2018-03-07

## 2018-03-07 RX ORDER — 0.9 % SODIUM CHLORIDE 0.9 %
VIAL (ML) INJECTION
Status: COMPLETED
Start: 2018-03-07 | End: 2018-03-07

## 2018-03-07 RX ORDER — ARIPIPRAZOLE 15 MG/1
40 TABLET ORAL EVERY 4 HOURS
Status: DISCONTINUED | OUTPATIENT
Start: 2018-03-07 | End: 2018-03-07

## 2018-03-07 RX ORDER — SODIUM CHLORIDE 9 MG/ML
INJECTION, SOLUTION INTRAVENOUS
Status: DISPENSED
Start: 2018-03-07 | End: 2018-03-08

## 2018-03-07 NOTE — PROGRESS NOTES
[facility] [place] [facility]     Progress Note        Evie Atwood Patient Status:  Inpatient    1960 MRN [id number]   [facility] 2W/SW Attending Shant Gama DO   Hosp Day # 6 PCP Drake Duncan MD       Subjective:   Patient s 4 mg Intravenous Q10 Min PRN   morphINE sulfate (PF) 2 MG/ML injection 2 mg 2 mg Intravenous Q2H PRN   Or      morphINE sulfate (PF) 4 MG/ML injection 4 mg 4 mg Intravenous Q2H PRN   Or      morphINE sulfate (PF) 4 MG/ML injection 8 mg 8 mg Intravenous Q2H vena cava. 7. Bilateral total hip arthroplasties. Xr Chest Ap Portable  (cpt=71045)    Result Date: 3/6/2018  CONCLUSION:  1. Mild-to-moderate cardiomegaly and mild pulmonary congestive changes but improved from previous study.  Persistent bibasilar

## 2018-03-07 NOTE — BH PROGRESS NOTE
Behavioral Health SOAP Note  SUBJECTIVE   Patient is a 62 y/o female with a h/o alcohol dependency. Patient does not work and lives with her .   Patient reports that she is very active at home with cooking, cleaning, gardening etc. She does not driv fair  Judgment: fair  ASSESSMENT/PLAN  Patient is a 61year old female with a history of etoh dependence. 1. SW will follow-up for motivational counseling re alcohol treatment options.    Render Timi YIP

## 2018-03-07 NOTE — PHYSICAL THERAPY NOTE
PHYSICAL THERAPY EVALUATION - INPATIENT     Room Number: 220/220-A  Evaluation Date: 3/7/2018  Type of Evaluation: Initial   Physician Order: PT Eval and Treat    Presenting Problem: Shock, s/p diagnostic laparotomy, laparoscopic repair of ulcer  Reason f MEDICAL/SOCIAL HISTORY     History related to current admission: Patient with hxof ETOh abuse, vision impairment, O, HTN, GI bleed, and thyroid disorder      Problem List  Principal Problem:    Shock ([address]  Active Problems:    TIMMY (acute kidney injury) (HC mechanics;Repositioning    COGNITION  · Orientation Level:  oriented to time, oriented to person and disoriented to place  · Following Commands:  follows multi-step commands inconsistently, follows one step commands with increased time and follows one step training    Patient End of Session: Up in chair;Call light within reach; Needs met;RN aware of session/findings; All patient questions and concerns addressed; Alarm set    CURRENT GOALS    Goals to be met by: 3/14/18  Patient Goal Patient's self-stated goal i

## 2018-03-07 NOTE — PLAN OF CARE
Problem: Patient/Family Goals  Goal: Patient/Family Long Term Goal  Patient's Long Term Goal: Heal well from surgery    Interventions:  - assess drains per shift as prn  - promote early ambulation  - See additional Care Plan goals for specific intervention instruct to report SOB or any respiratory difficulty  - Respiratory Therapy support as indicated  - Manage/alleviate anxiety  - Monitor for signs/symptoms of CO2 retention   Outcome: Progressing  2L high-flow nasal cannula, denies SOB, lungs clear    Probl ulcer development  - Assess and document skin integrity  - Assess and document dressing/incision, wound bed, drain sites and surrounding tissue  - Implement wound care per orders  - Initiate isolation precautions as appropriate  - Initiate Pressure Ulcer p Care  Goal: Patient preferences are identified and integrated in the patient's plan of care  Interventions:  - What would you like us to know as we care for you?  Brothers recently passed away  - Provide timely, complete, and accurate information to patient

## 2018-03-07 NOTE — PROGRESS NOTES
[facility]  Progress Note     Jose Yun  : 1960    Status: Inpatient  Day #: 6    Attending: Reyna Corrigan MD  PCP: Rossy Boykin MD      Assessment and Plan     Perforated gastric ulcer status post repair  - s/p diagnostic lap Pt feels nauseated today. Has some abd sore sensation near surgical site. BETINA drains in place. No F/C. Wants to drink some water. Had vomiting yesterday made NPO again.      ROS: 10 point ROS completed and was negative except as stated above     [facility] 18   ALT  28   --    --   21   ALKPHO  61   --    --   69   TP  5.8*   --    --   5.9       Xr Abdomen (1 View) (cpt=74018)    Result Date: 3/6/2018  CONCLUSION:  1. Moderate reflex ileus. 2. Air in the urinary bladder. 3. Drains in the abdomen.  4. Opaque

## 2018-03-08 ENCOUNTER — APPOINTMENT (OUTPATIENT)
Dept: CV DIAGNOSTICS | Facility: HOSPITAL | Age: 58
DRG: 853 | End: 2018-03-08
Attending: HOSPITALIST
Payer: COMMERCIAL

## 2018-03-08 LAB
ALBUMIN SERPL BCP-MCNC: 2.1 G/DL (ref 3.5–4.8)
ALBUMIN/GLOB SERPL: 0.6 {RATIO} (ref 1–2)
ALP SERPL-CCNC: 73 U/L (ref 32–100)
ALT SERPL-CCNC: 21 U/L (ref 14–54)
ANION GAP SERPL CALC-SCNC: 9 MMOL/L (ref 0–18)
AST SERPL-CCNC: 26 U/L (ref 15–41)
BASOPHILS # BLD: 0 K/UL (ref 0–0.2)
BASOPHILS NFR BLD: 0 %
BILIRUB SERPL-MCNC: 1 MG/DL (ref 0.3–1.2)
BUN SERPL-MCNC: 21 MG/DL (ref 8–20)
BUN/CREAT SERPL: 12.4 (ref 10–20)
CALCIUM SERPL-MCNC: 8.3 MG/DL (ref 8.5–10.5)
CHLORIDE SERPL-SCNC: 115 MMOL/L (ref 95–110)
CO2 SERPL-SCNC: 22 MMOL/L (ref 22–32)
CREAT SERPL-MCNC: 1.69 MG/DL (ref 0.5–1.5)
EOSINOPHIL # BLD: 0.2 K/UL (ref 0–0.7)
EOSINOPHIL NFR BLD: 1 %
ERYTHROCYTE [DISTWIDTH] IN BLOOD BY AUTOMATED COUNT: 14.1 % (ref 11–15)
GLOBULIN PLAS-MCNC: 3.6 G/DL (ref 2.5–3.7)
GLUCOSE SERPL-MCNC: 98 MG/DL (ref 70–99)
HCT VFR BLD AUTO: 33.9 % (ref 35–48)
HGB BLD-MCNC: 10.9 G/DL (ref 12–16)
LYMPHOCYTES # BLD: 1 K/UL (ref 1–4)
LYMPHOCYTES NFR BLD: 6 %
MCH RBC QN AUTO: 30.7 PG (ref 27–32)
MCHC RBC AUTO-ENTMCNC: 32.2 G/DL (ref 32–37)
MCV RBC AUTO: 95.1 FL (ref 80–100)
MONOCYTES # BLD: 0.9 K/UL (ref 0–1)
MONOCYTES NFR BLD: 5 %
NEUTROPHILS # BLD AUTO: 15.3 K/UL (ref 1.8–7.7)
NEUTROPHILS NFR BLD: 76 %
NEUTS BAND NFR BLD: 12 %
OSMOLALITY UR CALC.SUM OF ELEC: 305 MOSM/KG (ref 275–295)
PLATELET # BLD AUTO: 356 K/UL (ref 140–400)
PMV BLD AUTO: 9.4 FL (ref 7.4–10.3)
POTASSIUM SERPL-SCNC: 3.9 MMOL/L (ref 3.3–5.1)
POTASSIUM SERPL-SCNC: 3.9 MMOL/L (ref 3.3–5.1)
PROT SERPL-MCNC: 5.7 G/DL (ref 5.9–8.4)
RBC # BLD AUTO: 3.56 M/UL (ref 3.7–5.4)
SODIUM SERPL-SCNC: 146 MMOL/L (ref 136–144)
WBC # BLD AUTO: 17.3 K/UL (ref 4–11)

## 2018-03-08 PROCEDURE — 93306 TTE W/DOPPLER COMPLETE: CPT | Performed by: HOSPITALIST

## 2018-03-08 PROCEDURE — 99233 SBSQ HOSP IP/OBS HIGH 50: CPT | Performed by: HOSPITALIST

## 2018-03-08 PROCEDURE — 99232 SBSQ HOSP IP/OBS MODERATE 35: CPT | Performed by: INTERNAL MEDICINE

## 2018-03-08 RX ORDER — 0.9 % SODIUM CHLORIDE 0.9 %
VIAL (ML) INJECTION
Status: COMPLETED
Start: 2018-03-08 | End: 2018-03-08

## 2018-03-08 RX ORDER — 0.9 % SODIUM CHLORIDE 0.9 %
VIAL (ML) INJECTION
Status: DISPENSED
Start: 2018-03-08 | End: 2018-03-09

## 2018-03-08 RX ORDER — LORAZEPAM 2 MG/ML
0.5 INJECTION INTRAMUSCULAR EVERY 4 HOURS PRN
Status: DISCONTINUED | OUTPATIENT
Start: 2018-03-08 | End: 2018-03-14

## 2018-03-08 NOTE — OCCUPATIONAL THERAPY NOTE
OCCUPATIONAL THERAPY EVALUATION - INPATIENT     Room Number: 468/468-A  Evaluation Date: 3/8/2018  Type of Evaluation: Initial  Presenting Problem:  (abdominal pain, perferated viscus s/p diagnostic laparoscopy)    Physician Order: IP Consult to Occupation blood pressure    • Hypothyroidism    • Osteoarthritis of left hip 2010   • Unspecified essential hypertension    • Visual impairment        Past Surgical History  Past Surgical History:  05-: ELECTROCARDIOGRAM, COMPLETE      Comment: Scanned to Med includes using toilet, bedpan or urinal? : A Little  -   Putting on and taking off regular upper body clothing?: A Little  -   Taking care of personal grooming such as brushing teeth?: None  -   Eating meals?: None    AM-PAC Score:  Score: 18  Approx Degre

## 2018-03-08 NOTE — PLAN OF CARE
GASTROINTESTINAL - ADULT    • Maintains or returns to baseline bowel function Not Progressing    • Maintains adequate nutritional intake (undernourished) Not Progressing        RESPIRATORY - ADULT    • Achieves optimal ventilation and oxygenation Not Progr

## 2018-03-08 NOTE — PROGRESS NOTES
[facility]  Hospitalist Progress  Note     Evie Atwood Patient Status:  Inpatient    117/1960  61year old CSN [id number]   Location 468/468-A Attending Stefanie Mathias MD   Hosp Day # 7 PCP Drake Duncan MD     ASSESSMENT/PLAN    Perfor RRR, S1S2, and intact distal pulses. No gallop, rub, murmur. Pulm: Effort and breath sounds normal. No distress, wheezes, rales, rhonchi. Abd: Soft, NTND, BS normal, no mass, no HSM, no rebound/guarding. Neuro: Normal reflexes, CN.  Sensory/motor exams spent counseling and coordinating care in the form of educating pt/family and d/w consultants and staff. Discussed with patient at length. Discussed with infectious disease at length.

## 2018-03-08 NOTE — PHYSICAL THERAPY NOTE
PHYSICAL THERAPY TREATMENT NOTE - INPATIENT     Room Number: 468/468-A       Presenting Problem: Shock, s/p diagnostic laparotomy, laparoscopic repair of ulcer    Problem List  Principal Problem:    Shock [facility])  Active Problems:    TIMMY (acute kidney injury bedclothes, sheets and blankets)?: A Little   -   Sitting down on and standing up from a chair with arms (e.g., wheelchair, bedside commode, etc.): A Little   -   Moving from lying on back to sitting on the side of the bed?: A Little   How much help from a

## 2018-03-08 NOTE — PROGRESS NOTES
[facility]     Progress Note        Jones Poole Patient Status:  Inpatient    1960 MRN [id number]   [facility] 2W/SW Attending Dora Toribio,    Hosp Day # 7 PCP Isidra Fitzpatrick MD       Subjective:   Patient s ondansetron HCl (ZOFRAN) injection 4 mg 4 mg Intravenous Q6H PRN   Heparin Sodium (Porcine) 5000 UNIT/ML injection 5,000 Units 5,000 Units Subcutaneous 2 times per day   Piperacillin Sod-Tazobactam So (ZOSYN) 3.375 g in dextrose 5 % 100 mL ADD-vantage 3. EtOH withdrawal     Plan   -Patient presents with evidence of severe abdominal pain and near syncopal episode. -CT abdomen pelvis with evidence of perforated viscus and extensive pneumoperitoneum seen.     Underwent repair of perforated gastric ulcer on 2

## 2018-03-08 NOTE — PROGRESS NOTES
[place] [facility] - [facility]    Progress Note    Anjelica Monet Patient Status:  Inpatient    1960 MRN [id number]   Location [facility] 4W/SW/SE Attending Abhi Oneill MD   [facility] Day # 7 PCP Adrianna Hernandez MD     Assessment and Plan:     Slow pr 3.9 03/08/2018    (H) 03/08/2018   CO2 22 03/08/2018   GLU 98 03/08/2018   CA 8.3 (L) 03/08/2018   ALB 2.1 (L) 03/08/2018   ALKPHO 73 03/08/2018   BILT 1.0 03/08/2018   TP 5.7 (L) 03/08/2018   AST 26 03/08/2018   ALT 21 03/08/2018   PTT 37.5 (H) 03/0

## 2018-03-08 NOTE — CONSULTS
INFECTIOUS DISEASE CONSULT NOTE    Serene Perch Patient Status:  Inpatient    1960 MRN [id number]   Location [facility] 4W/SW/SE Attending Boy Castaneda MD   [facility] Day # 7 PCP Maria Esther HUDDLESTON 05-  No date: FRACTURE SURGERY  2010: HIP REPLACEMENT SURGERY Left      Comment: left hip replacement  2013: HIP REPLACEMENT SURGERY Right      Comment: right hip replacement  09/28/15: OTHER Right      Comment: ORIF patella  No date: TOTAL HIP mL, Intramuscular, Prior to discharge    Review of Systems:    REVIEW OF SYSTEMS:  CONSTITUTIONAL:  No weight loss, +weakness or fatigue. HEENT:  Eyes:  No visual loss, blurred vision, double vision or yellow sclerae.  Ears, Nose, Throat:  No hearing loss, 3.9  3.9   CL  111*  111*   --   115*   CO2  26  23   --   22   ALKPHO   --   69   --   73   AST   --   18   --   26   ALT   --   21   --   21   BILT   --   1.3*   --   1.0   TP   --   5.9   --   5.7*       Microbiology    Reviewed in EMR    Radiology: Rev questions. I will continue to follow with you and will make further recommendations based on his progress.     Chapincito Gupta  3/8/2018

## 2018-03-08 NOTE — PROGRESS NOTES
[facility]    Progress Note    Thomas Hinds Patient Status:  Inpatient    1960 MRN [id number]   Location [facility] 4W/SW/SE Attending Janet Delarosa MD   [facility] Day # 6 PCP Marcelino Bailey MD     Assessment and Plan:     Pt se (0.1)    Stool 0 (0) 0 (0) 0 (0)    Total Output 2615 1417 2195    Net +787 +1296 -1763           Stool Occurrence 1 x 1 x 3 x    Emesis Occurrence  2 x           Exam: Loose small BM, abd protuberant soft dressings secure, drains serous    Results:     La

## 2018-03-09 ENCOUNTER — APPOINTMENT (OUTPATIENT)
Dept: CT IMAGING | Facility: HOSPITAL | Age: 58
DRG: 853 | End: 2018-03-09
Attending: INTERNAL MEDICINE
Payer: COMMERCIAL

## 2018-03-09 LAB
ANION GAP SERPL CALC-SCNC: 12 MMOL/L (ref 0–18)
BASOPHILS # BLD: 0.1 K/UL (ref 0–0.2)
BASOPHILS NFR BLD: 1 %
BUN SERPL-MCNC: 17 MG/DL (ref 8–20)
BUN/CREAT SERPL: 10.1 (ref 10–20)
CALCIUM SERPL-MCNC: 8.8 MG/DL (ref 8.5–10.5)
CHLORIDE SERPL-SCNC: 110 MMOL/L (ref 95–110)
CO2 SERPL-SCNC: 21 MMOL/L (ref 22–32)
CREAT SERPL-MCNC: 1.69 MG/DL (ref 0.5–1.5)
EOSINOPHIL # BLD: 0.1 K/UL (ref 0–0.7)
EOSINOPHIL NFR BLD: 0 %
ERYTHROCYTE [DISTWIDTH] IN BLOOD BY AUTOMATED COUNT: 13.8 % (ref 11–15)
GLUCOSE SERPL-MCNC: 91 MG/DL (ref 70–99)
HCT VFR BLD AUTO: 34.7 % (ref 35–48)
HGB BLD-MCNC: 11.6 G/DL (ref 12–16)
LYMPHOCYTES # BLD: 1.2 K/UL (ref 1–4)
LYMPHOCYTES NFR BLD: 7 %
MAGNESIUM SERPL-MCNC: 1.8 MG/DL (ref 1.8–2.5)
MCH RBC QN AUTO: 31.7 PG (ref 27–32)
MCHC RBC AUTO-ENTMCNC: 33.4 G/DL (ref 32–37)
MCV RBC AUTO: 95 FL (ref 80–100)
MONOCYTES # BLD: 1.1 K/UL (ref 0–1)
MONOCYTES NFR BLD: 6 %
NEUTROPHILS # BLD AUTO: 15.5 K/UL (ref 1.8–7.7)
NEUTROPHILS NFR BLD: 86 %
OSMOLALITY UR CALC.SUM OF ELEC: 297 MOSM/KG (ref 275–295)
PHOSPHATE SERPL-MCNC: 3.6 MG/DL (ref 2.4–4.7)
PLATELET # BLD AUTO: 431 K/UL (ref 140–400)
PMV BLD AUTO: 9.1 FL (ref 7.4–10.3)
POTASSIUM SERPL-SCNC: 3.6 MMOL/L (ref 3.3–5.1)
RBC # BLD AUTO: 3.65 M/UL (ref 3.7–5.4)
SODIUM SERPL-SCNC: 143 MMOL/L (ref 136–144)
WBC # BLD AUTO: 18 K/UL (ref 4–11)

## 2018-03-09 PROCEDURE — 74176 CT ABD & PELVIS W/O CONTRAST: CPT | Performed by: INTERNAL MEDICINE

## 2018-03-09 PROCEDURE — 99232 SBSQ HOSP IP/OBS MODERATE 35: CPT | Performed by: INTERNAL MEDICINE

## 2018-03-09 PROCEDURE — 99233 SBSQ HOSP IP/OBS HIGH 50: CPT | Performed by: HOSPITALIST

## 2018-03-09 RX ORDER — HYDROCODONE BITARTRATE AND ACETAMINOPHEN 5; 325 MG/1; MG/1
2 TABLET ORAL EVERY 6 HOURS PRN
Status: DISCONTINUED | OUTPATIENT
Start: 2018-03-09 | End: 2018-03-09

## 2018-03-09 RX ORDER — SODIUM CHLORIDE 9 MG/ML
INJECTION, SOLUTION INTRAVENOUS
Status: COMPLETED
Start: 2018-03-09 | End: 2018-03-09

## 2018-03-09 RX ORDER — 0.9 % SODIUM CHLORIDE 0.9 %
VIAL (ML) INJECTION
Status: COMPLETED
Start: 2018-03-09 | End: 2018-03-09

## 2018-03-09 RX ORDER — POTASSIUM CHLORIDE 29.8 MG/ML
40 INJECTION INTRAVENOUS ONCE
Status: COMPLETED | OUTPATIENT
Start: 2018-03-09 | End: 2018-03-09

## 2018-03-09 RX ORDER — SODIUM CHLORIDE 9 MG/ML
INJECTION, SOLUTION INTRAVENOUS CONTINUOUS
Status: DISCONTINUED | OUTPATIENT
Start: 2018-03-09 | End: 2018-03-14

## 2018-03-09 RX ORDER — ACETAMINOPHEN 500 MG
500 TABLET ORAL ONCE
Status: COMPLETED | OUTPATIENT
Start: 2018-03-09 | End: 2018-03-09

## 2018-03-09 RX ORDER — MAGNESIUM SULFATE HEPTAHYDRATE 40 MG/ML
2 INJECTION, SOLUTION INTRAVENOUS ONCE
Status: COMPLETED | OUTPATIENT
Start: 2018-03-09 | End: 2018-03-09

## 2018-03-09 RX ORDER — HYDROCODONE BITARTRATE AND ACETAMINOPHEN 5; 325 MG/1; MG/1
1 TABLET ORAL EVERY 6 HOURS PRN
Status: DISCONTINUED | OUTPATIENT
Start: 2018-03-09 | End: 2018-03-09

## 2018-03-09 NOTE — PROGRESS NOTES
[facility]    Progress Note    Mick Finch Patient Status:  Inpatient    1960 MRN [id number]   Location [facility] 4W/SW/SE Attending Matilda Thorpe, [address] Day # 8 PCP Luz Abarca MD       Subjective:   Mick Finch is 3.375 g in dextrose 5 % 100 mL ADD-vantage 3.375 g Intravenous Q8H   Fluconazole in Sodium Chloride (DIFLUCAN) IVPB premix 400 mg 400 mg Intravenous Q24H   Albumin Human (ALBUMINAR) 25 % solution 25 g 25 g Intravenous Once   influenza vaccine split quad (F Assessment and Plan:      Perforated gastric ulcer. Status post laparoscopic repair with peritoneal irrigation. Overall doing well though she does have an ileus. - ileus resolving. Multiple bm's - green loose stools overnight.  Started on CL

## 2018-03-09 NOTE — DIETARY NOTE
NUTRITION - REASSESSMENT     Pt is at moderate nutrition risk. Pt does not meet malnutrition criteria. RECOMMENDATIONS TO MD:  continue to advance diet as tolerated.       NUTRITION DIAGNOSIS/PROBLEM:  Inadequate protein energy intake related to inte providers    - Discharge and transfer of nutrition care to new setting or provider: monitor plans    ADMITTING DIAGNOSIS:   Shock ([facility] [R57.9]  Low bicarbonate [E87.8]  TIMMY (acute kidney injury) ([facility] [N17.9]  Perforated bowel ([facility] [K63.1]    PERTINENT P (PROTONIX) IV push  40 mg Intravenous Daily   • Heparin Sodium (Porcine)  5,000 Units Subcutaneous 2 times per day   • piperacillin-tazobactam  3.375 g Intravenous Q8H   • fluconazole  400 mg Intravenous Q24H   • Albumin Human  25 g Intravenous Once

## 2018-03-09 NOTE — PROGRESS NOTES
[facility]     Progress Note        Sharon Thorpe Patient Status:  Inpatient    1960 MRN [id number]   [facility] 2W/SW Attending James Gonzalez, DO   Hosp Day # 8 PCP Dwayne Worthington MD       Subjective:   Patient s Albumin Human (ALBUMINAR) 25 % solution 25 g 25 g Intravenous Once   influenza vaccine split quad (FLULAVAL) ages 6 months to 65 years inj 0.5ml 0.5 mL Intramuscular Prior to discharge       Continuous Infusions:   • sodium chloride 50 mL/hr at 03/09/18

## 2018-03-09 NOTE — PHYSICAL THERAPY NOTE
PHYSICAL THERAPY TREATMENT NOTE - INPATIENT     Room Number: 468/468-A       Presenting Problem: Shock, s/p diagnostic laparotomy, laparoscopic repair of ulcer    Problem List  Principal Problem:    Shock [facility])  Active Problems:    TIMMY (acute kidney injury (including adjusting bedclothes, sheets and blankets)?: A Little   -   Sitting down on and standing up from a chair with arms (e.g., wheelchair, bedside commode, etc.): A Little   -   Moving from lying on back to sitting on the side of the bed?: A Little to patient in preparation for discharge.    Goal #5   Current Status IN PROGRESS   Goal #6    Goal #6  Current Status

## 2018-03-09 NOTE — PROGRESS NOTES
INFECTIOUS DISEASE PROGRESS NOTE    Martinfreedom Cavazos Patient Status:  Inpatient    1960 MRN [id number]   Location [facility] 4W/SW/SE Attending Gabby Ivey, [address] [facility] Day # 8 PCP Javier Hodge MD     Subjective:  Low grade temps, increased acid 9.5, acute kidney injury, hypotension requiring levophed. CT of the abdomen and pelvis with perforated viscus with extensive pneumoperitoneum with moderate to large volume slightly complex ascites.   Patient taken emergently to the OR for diagnostic l

## 2018-03-09 NOTE — PROGRESS NOTES
[place] [facility] - [facility]    Progress Note    Burt Jurist Patient Status:  Inpatient    1960 MRN [id number]   Location [facility] 4W/SW/SE Attending Faustino Edwards, [address] [facility] Day # 8 PCP Kyle Zepeda MD     Assessment and Plan:     Slow

## 2018-03-09 NOTE — CM/SW NOTE
3/9CM-MD orders received in regards to discharge planning. The Patient's MD requested this Writer to see the Patient about rehab. This Writer met with the Patient at bedside. The Patient resides with her  in [place] in single family home.   Prior

## 2018-03-10 LAB
ANION GAP SERPL CALC-SCNC: 9 MMOL/L (ref 0–18)
BACTERIA UR QL AUTO: NEGATIVE /HPF
BASOPHILS # BLD: 0 K/UL (ref 0–0.2)
BASOPHILS NFR BLD: 0 %
BILIRUB UR QL: NEGATIVE
BUN SERPL-MCNC: 9 MG/DL (ref 8–20)
BUN/CREAT SERPL: 6.3 (ref 10–20)
CALCIUM SERPL-MCNC: 7.8 MG/DL (ref 8.5–10.5)
CHLORIDE SERPL-SCNC: 107 MMOL/L (ref 95–110)
CO2 SERPL-SCNC: 19 MMOL/L (ref 22–32)
COLOR UR: YELLOW
CREAT SERPL-MCNC: 1.42 MG/DL (ref 0.5–1.5)
EOSINOPHIL # BLD: 0.1 K/UL (ref 0–0.7)
EOSINOPHIL NFR BLD: 1 %
ERYTHROCYTE [DISTWIDTH] IN BLOOD BY AUTOMATED COUNT: 13.8 % (ref 11–15)
GLUCOSE SERPL-MCNC: 144 MG/DL (ref 70–99)
GLUCOSE UR-MCNC: NEGATIVE MG/DL
HCT VFR BLD AUTO: 31.1 % (ref 35–48)
HGB BLD-MCNC: 10.5 G/DL (ref 12–16)
KETONES UR-MCNC: NEGATIVE MG/DL
LEUKOCYTE ESTERASE UR QL STRIP.AUTO: NEGATIVE
LYMPHOCYTES # BLD: 0.5 K/UL (ref 1–4)
LYMPHOCYTES NFR BLD: 4 %
MAGNESIUM SERPL-MCNC: 1.9 MG/DL (ref 1.8–2.5)
MCH RBC QN AUTO: 32 PG (ref 27–32)
MCHC RBC AUTO-ENTMCNC: 33.7 G/DL (ref 32–37)
MCV RBC AUTO: 95 FL (ref 80–100)
MONOCYTES # BLD: 0.5 K/UL (ref 0–1)
MONOCYTES NFR BLD: 5 %
NEUTROPHILS # BLD AUTO: 9.6 K/UL (ref 1.8–7.7)
NEUTROPHILS NFR BLD: 90 %
NITRITE UR QL STRIP.AUTO: NEGATIVE
OSMOLALITY UR CALC.SUM OF ELEC: 281 MOSM/KG (ref 275–295)
PH UR: 5 [PH] (ref 5–8)
PLATELET # BLD AUTO: 442 K/UL (ref 140–400)
PMV BLD AUTO: 9 FL (ref 7.4–10.3)
POTASSIUM SERPL-SCNC: 3.4 MMOL/L (ref 3.3–5.1)
POTASSIUM SERPL-SCNC: 3.4 MMOL/L (ref 3.3–5.1)
PROT UR-MCNC: 100 MG/DL
RBC # BLD AUTO: 3.28 M/UL (ref 3.7–5.4)
RBC #/AREA URNS AUTO: 202 /HPF
SODIUM SERPL-SCNC: 135 MMOL/L (ref 136–144)
SP GR UR STRIP: 1.02 (ref 1–1.03)
UROBILINOGEN UR STRIP-ACNC: <2
VIT C UR-MCNC: NEGATIVE MG/DL
WBC # BLD AUTO: 10.8 K/UL (ref 4–11)
WBC #/AREA URNS AUTO: 23 /HPF

## 2018-03-10 PROCEDURE — 99233 SBSQ HOSP IP/OBS HIGH 50: CPT | Performed by: HOSPITALIST

## 2018-03-10 RX ORDER — 0.9 % SODIUM CHLORIDE 0.9 %
VIAL (ML) INJECTION
Status: COMPLETED
Start: 2018-03-10 | End: 2018-03-10

## 2018-03-10 RX ORDER — POTASSIUM CHLORIDE 29.8 MG/ML
40 INJECTION INTRAVENOUS ONCE
Status: DISCONTINUED | OUTPATIENT
Start: 2018-03-10 | End: 2018-03-10

## 2018-03-10 NOTE — PLAN OF CARE
GASTROINTESTINAL - ADULT    • Maintains or returns to baseline bowel function Not Progressing    • Maintains adequate nutritional intake (undernourished) Not Progressing        Patient continues to be on clear liquids with a distended abdomen.  Scheduled re

## 2018-03-10 NOTE — PROGRESS NOTES
[facility]    Progress Note    Evie Atwood Patient Status:  Inpatient    1960 MRN [id number]   Location [facility] 4W/SW/SE Attending Kavita Gold MD   [facility] Day # 9 PCP Drake Duncan MD     Assessment and Plan:     CT (L) 03/10/2018   ALB 2.1 (L) 03/08/2018   ALKPHO 73 03/08/2018   BILT 1.0 03/08/2018   TP 5.7 (L) 03/08/2018   AST 26 03/08/2018   ALT 21 03/08/2018   PTT 37.5 (H) 03/02/2018   INR 1.6 (H) 03/02/2018   T4F 1.03 08/10/2017   TSH 0.22 (L) 08/10/2017   LIP 15

## 2018-03-10 NOTE — PROGRESS NOTES
[facility]    Progress Note    Yazmin Winters Patient Status:  Inpatient    1960 MRN [id number]   Location [facility] 4W/SW/SE Attending Zeinab Galicia MD   [facility] Day # 9 PCP Zuhair Castro MD       Subjective:   Yazmin Winters i Piperacillin Sod-Tazobactam So (ZOSYN) 3.375 g in dextrose 5 % 100 mL ADD-vantage 3.375 g Intravenous Q8H   Fluconazole in Sodium Chloride (DIFLUCAN) IVPB premix 400 mg 400 mg Intravenous Q24H   Albumin Human (ALBUMINAR) 25 % solution 25 g 25 g Intraveno Date    (H) 03/10/2018    (H) 03/09/2018    03/08/2018       Recent Labs   Lab  03/08/18   0517  03/09/18   0541  03/10/18   0421   GLU  98  91  144*   BUN  21*  17  9   CREATSERUM  1.69*  1.69*  1.42   GFRAA  38*  38*  47*   GFRNAA  3 status: Full  Dispo: Pt refusing rehab, will need HHC on DC     >35 min spent  [facility].  Leticia Graves, 03/10/18

## 2018-03-10 NOTE — PROGRESS NOTES
INFECTIOUS DISEASE PROGRESS NOTE    Rogelio Sheffield Patient Status:  Inpatient    1960 MRN [id number]   Location [facility] 4W/SW/SE Attending Amie Melissa, [facility] Day # 9 PCP William Kim MD     Subjective:  Spiked fever 100.9 last nig acid 9.5, acute kidney injury, hypotension requiring levophed. CT of the abdomen and pelvis with perforated viscus with extensive pneumoperitoneum with moderate to large volume slightly complex ascites.   Patient taken emergently to the OR for diagnostic l

## 2018-03-10 NOTE — PLAN OF CARE
GASTROINTESTINAL - ADULT    • Minimal or absence of nausea and vomiting Not Progressing    • Maintains or returns to baseline bowel function Not Progressing    • Maintains adequate nutritional intake (undernourished) Not Progressing        Patient did deve

## 2018-03-11 LAB
ALBUMIN SERPL BCP-MCNC: 2 G/DL (ref 3.5–4.8)
ALBUMIN/GLOB SERPL: 0.5 {RATIO} (ref 1–2)
ALP SERPL-CCNC: 56 U/L (ref 32–100)
ALT SERPL-CCNC: 15 U/L (ref 14–54)
ANION GAP SERPL CALC-SCNC: 6 MMOL/L (ref 0–18)
AST SERPL-CCNC: 17 U/L (ref 15–41)
BASOPHILS # BLD: 0 K/UL (ref 0–0.2)
BASOPHILS NFR BLD: 0 %
BILIRUB SERPL-MCNC: 0.3 MG/DL (ref 0.3–1.2)
BUN SERPL-MCNC: 6 MG/DL (ref 8–20)
BUN/CREAT SERPL: 4.8 (ref 10–20)
CALCIUM SERPL-MCNC: 7.9 MG/DL (ref 8.5–10.5)
CHLORIDE SERPL-SCNC: 109 MMOL/L (ref 95–110)
CO2 SERPL-SCNC: 19 MMOL/L (ref 22–32)
CREAT SERPL-MCNC: 1.26 MG/DL (ref 0.5–1.5)
EOSINOPHIL # BLD: 0.1 K/UL (ref 0–0.7)
EOSINOPHIL NFR BLD: 1 %
ERYTHROCYTE [DISTWIDTH] IN BLOOD BY AUTOMATED COUNT: 14 % (ref 11–15)
GLOBULIN PLAS-MCNC: 4 G/DL (ref 2.5–3.7)
GLUCOSE SERPL-MCNC: 138 MG/DL (ref 70–99)
HCT VFR BLD AUTO: 30.2 % (ref 35–48)
HGB BLD-MCNC: 10 G/DL (ref 12–16)
LYMPHOCYTES # BLD: 0.8 K/UL (ref 1–4)
LYMPHOCYTES NFR BLD: 8 %
MAGNESIUM SERPL-MCNC: 1.8 MG/DL (ref 1.8–2.5)
MCH RBC QN AUTO: 31.4 PG (ref 27–32)
MCHC RBC AUTO-ENTMCNC: 33.2 G/DL (ref 32–37)
MCV RBC AUTO: 94.7 FL (ref 80–100)
MONOCYTES # BLD: 0.7 K/UL (ref 0–1)
MONOCYTES NFR BLD: 7 %
NEUTROPHILS # BLD AUTO: 8.2 K/UL (ref 1.8–7.7)
NEUTROPHILS NFR BLD: 84 %
OSMOLALITY UR CALC.SUM OF ELEC: 278 MOSM/KG (ref 275–295)
PLATELET # BLD AUTO: 418 K/UL (ref 140–400)
PMV BLD AUTO: 8.1 FL (ref 7.4–10.3)
POTASSIUM SERPL-SCNC: 3.6 MMOL/L (ref 3.3–5.1)
POTASSIUM SERPL-SCNC: 3.6 MMOL/L (ref 3.3–5.1)
PROT SERPL-MCNC: 6 G/DL (ref 5.9–8.4)
RBC # BLD AUTO: 3.19 M/UL (ref 3.7–5.4)
SODIUM SERPL-SCNC: 134 MMOL/L (ref 136–144)
WBC # BLD AUTO: 9.8 K/UL (ref 4–11)

## 2018-03-11 PROCEDURE — 99233 SBSQ HOSP IP/OBS HIGH 50: CPT | Performed by: HOSPITALIST

## 2018-03-11 RX ORDER — MAGNESIUM SULFATE HEPTAHYDRATE 40 MG/ML
2 INJECTION, SOLUTION INTRAVENOUS ONCE
Status: COMPLETED | OUTPATIENT
Start: 2018-03-11 | End: 2018-03-11

## 2018-03-11 NOTE — PROGRESS NOTES
[facility]    Progress Note    Na Nipper Patient Status:  Inpatient    1960 MRN [id number]   Location [facility] 4W/SW/SE Attending Johanna Gomez MD   Hosp Day # 10 PCP Dewey Shone, MD     Assessment and Plan:     Erlinda Ferguson (L) 08/10/2017   LIP 15 (L) 03/02/2018   DDIMER 2.72 (H) 03/01/2018   MG 1.8 03/11/2018   PHOS 3.6 03/09/2018   TROP 0.04 (HH) 03/01/2018   B12 1,454 (H) 08/10/2017       Ct Abdomen+pelvis(cpt=74176)    Result Date: 3/9/2018  CONCLUSION:  1.  Postop changes

## 2018-03-11 NOTE — PROGRESS NOTES
[facility]    Progress Note    Thomas Hinds Patient Status:  Inpatient    1960 MRN [id number]   Location [facility] 4W/SW/SE Attending Carolina Weaver MD   [facility] Day [facility] PCP Marcelino Bailey MD       Subjective:   Thomas Hinds 5,000 Units 5,000 Units Subcutaneous 2 times per day   Piperacillin Sod-Tazobactam So (ZOSYN) 3.375 g in dextrose 5 % 100 mL ADD-vantage 3.375 g Intravenous Q8H   Fluconazole in Sodium Chloride (DIFLUCAN) IVPB premix 400 mg 400 mg Intravenous Q24H   Albumi 03/09/2018        Lab Results  Component Value Date    (H) 03/11/2018    (H) 03/10/2018    (H) 03/09/2018       Recent Labs   Lab  03/09/18   0541  03/10/18   0421  03/11/18   0554   GLU  91  144*  138*   BUN  17  9  6*   CREATSERUM  1 Heparin  CODE status: Full  Dispo: Pt refusing rehab, will need HHC on DC, hopefully 1-2 days     >35 min spent  [facility].  Sandrine Duke, 03/11/18

## 2018-03-12 LAB
ANION GAP SERPL CALC-SCNC: 8 MMOL/L (ref 0–18)
BASOPHILS # BLD: 0 K/UL (ref 0–0.2)
BASOPHILS NFR BLD: 0 %
BUN SERPL-MCNC: 4 MG/DL (ref 8–20)
BUN/CREAT SERPL: 3.4 (ref 10–20)
CALCIUM SERPL-MCNC: 8.1 MG/DL (ref 8.5–10.5)
CHLORIDE SERPL-SCNC: 110 MMOL/L (ref 95–110)
CO2 SERPL-SCNC: 18 MMOL/L (ref 22–32)
CREAT SERPL-MCNC: 1.18 MG/DL (ref 0.5–1.5)
EOSINOPHIL # BLD: 0.1 K/UL (ref 0–0.7)
EOSINOPHIL NFR BLD: 1 %
ERYTHROCYTE [DISTWIDTH] IN BLOOD BY AUTOMATED COUNT: 14 % (ref 11–15)
GLUCOSE SERPL-MCNC: 98 MG/DL (ref 70–99)
HCT VFR BLD AUTO: 32.1 % (ref 35–48)
HGB BLD-MCNC: 10.5 G/DL (ref 12–16)
LYMPHOCYTES # BLD: 1 K/UL (ref 1–4)
LYMPHOCYTES NFR BLD: 9 %
MAGNESIUM SERPL-MCNC: 2 MG/DL (ref 1.8–2.5)
MCH RBC QN AUTO: 31.4 PG (ref 27–32)
MCHC RBC AUTO-ENTMCNC: 32.8 G/DL (ref 32–37)
MCV RBC AUTO: 96 FL (ref 80–100)
MONOCYTES # BLD: 0.9 K/UL (ref 0–1)
MONOCYTES NFR BLD: 8 %
NEUTROPHILS # BLD AUTO: 8.9 K/UL (ref 1.8–7.7)
NEUTROPHILS NFR BLD: 82 %
OSMOLALITY UR CALC.SUM OF ELEC: 279 MOSM/KG (ref 275–295)
PLATELET # BLD AUTO: 407 K/UL (ref 140–400)
PMV BLD AUTO: 8.5 FL (ref 7.4–10.3)
POTASSIUM SERPL-SCNC: 3.7 MMOL/L (ref 3.3–5.1)
RBC # BLD AUTO: 3.35 M/UL (ref 3.7–5.4)
SODIUM SERPL-SCNC: 136 MMOL/L (ref 136–144)
WBC # BLD AUTO: 10.9 K/UL (ref 4–11)

## 2018-03-12 PROCEDURE — 99233 SBSQ HOSP IP/OBS HIGH 50: CPT | Performed by: HOSPITALIST

## 2018-03-12 RX ORDER — POTASSIUM CHLORIDE 20 MEQ/1
40 TABLET, EXTENDED RELEASE ORAL ONCE
Status: COMPLETED | OUTPATIENT
Start: 2018-03-12 | End: 2018-03-12

## 2018-03-12 NOTE — OCCUPATIONAL THERAPY NOTE
OCCUPATIONAL THERAPY TREATMENT NOTE - INPATIENT        Room Number: 468/468-A           Presenting Problem:  (abdominal pain, perferated viscus s/p diagnostic laparoscopy)    Problem List  Principal Problem:    Shock ([address]  Active Problems:    TIMMY (acute k training;UE strengthening/ROM; Endurance training;Patient/Family education;Patient/Family training;Equipment eval/education; Compensatory technique education    SUBJECTIVE  \"I'm not going to rehab, I want to go home from here. \"     OBJECTIVE  Precautions: Session: In bed;Needs met;Call light within reach;RN aware of session/findings; All patient questions and concerns addressed; Alarm set    OT Goals:  Patients self stated goal is: to d/c home       Patient will complete bed <> toilet transfer with RW and SBA

## 2018-03-12 NOTE — PHYSICAL THERAPY NOTE
PHYSICAL THERAPY TREATMENT NOTE - INPATIENT     Room Number: 468/468-A       Presenting Problem: Shock, s/p diagnostic laparotomy, laparoscopic repair of ulcer    Problem List  Principal Problem:    Shock [facility])  Active Problems:    TIMMY (acute kidney injury the patient currently have. ..  -   Turning over in bed (including adjusting bedclothes, sheets and blankets)?: A Little   -   Sitting down on and standing up from a chair with arms (e.g., wheelchair, bedside commode, etc.): A Little   -   Moving from lying independence with home activity/exercise instructions provided to patient in preparation for discharge.    Goal #5   Current Status IN PROGRESS   Goal #6    Goal #6  Current Status

## 2018-03-12 NOTE — PROGRESS NOTES
INFECTIOUS DISEASE PROGRESS NOTE    Alberto Cavazos Patient Status:  Inpatient    1960 MRN [id number]   Location [facility] 4W/SW/SE Attending Gabby Ivey, [address] Day # 11 PCP Javier Hodge MD     Subjective:  Fever curve improving, Tma appetite, chills. On admission patient afebrile with WBC of 3.9, lactic acid 9.5, acute kidney injury, hypotension requiring levophed.   CT of the abdomen and pelvis with perforated viscus with extensive pneumoperitoneum with moderate to large volume sligh

## 2018-03-12 NOTE — PROGRESS NOTES
[facility]    Progress Note    Helena Campa Patient Status:  Inpatient    1960 MRN [id number]   Location [facility] 4W/SW/SE Attending Guilherme Lowery MD   [facility] Day # 11 PCP Erich Huerta MD       Subjective:   Helena Campa % 100 mL ADD-vantage 3.375 g Intravenous Q8H   Fluconazole in Sodium Chloride (DIFLUCAN) IVPB premix 400 mg 400 mg Intravenous Q24H   Albumin Human (ALBUMINAR) 25 % solution 25 g 25 g Intravenous Once   influenza vaccine split quad (FLULAVAL) ages 6 months Plan:      Perforated gastric ulcer. Status post laparoscopic repair with peritoneal irrigation. Overall doing well   Tolerating diet  - ileus resolved. Initially with loose BMs, now formed  -Increase activity - seen by PT and recommending Skilled rehab.

## 2018-03-12 NOTE — PROGRESS NOTES
[facility]    Progress Note    Anice Silvius Patient Status:  Inpatient    1960 MRN [id number]   Location [facility] 4W/SW/SE Attending Nichol Fonseca MD   [facility] Day [facility] PCP Jacky Archer MD     Assessment and Plan:     Eden Clemente 2.0 03/12/2018   PHOS 3.6 03/09/2018   TROP 0.04 (HH) 03/01/2018   B12 1,454 (H) 08/10/2017                     Damion Hickey MD  3/12/2018

## 2018-03-13 ENCOUNTER — APPOINTMENT (OUTPATIENT)
Dept: GENERAL RADIOLOGY | Facility: HOSPITAL | Age: 58
DRG: 853 | End: 2018-03-13
Attending: HOSPITALIST
Payer: COMMERCIAL

## 2018-03-13 LAB
POTASSIUM SERPL-SCNC: 3.6 MMOL/L (ref 3.3–5.1)
POTASSIUM SERPL-SCNC: 4.3 MMOL/L (ref 3.3–5.1)

## 2018-03-13 PROCEDURE — 99233 SBSQ HOSP IP/OBS HIGH 50: CPT | Performed by: HOSPITALIST

## 2018-03-13 PROCEDURE — 71046 X-RAY EXAM CHEST 2 VIEWS: CPT | Performed by: HOSPITALIST

## 2018-03-13 RX ORDER — FUROSEMIDE 20 MG/1
20 TABLET ORAL DAILY
Status: DISCONTINUED | OUTPATIENT
Start: 2018-03-13 | End: 2018-03-14

## 2018-03-13 RX ORDER — POTASSIUM CHLORIDE 20 MEQ/1
40 TABLET, EXTENDED RELEASE ORAL EVERY 4 HOURS
Status: DISPENSED | OUTPATIENT
Start: 2018-03-13 | End: 2018-03-13

## 2018-03-13 NOTE — PLAN OF CARE
RESPIRATORY - ADULT    • Achieves optimal ventilation and oxygenation Not Progressing          CARDIOVASCULAR - ADULT    • Maintains optimal cardiac output and hemodynamic stability Progressing        GASTROINTESTINAL - ADULT    • Minimal or absence of missy

## 2018-03-13 NOTE — PROGRESS NOTES
[facility]    Progress Note    Hitesh Lopez Patient Status:  Inpatient    1960 MRN [id number]   Location [facility] 4W/SW/SE Attending Lester Pa MD   [facility] Day # 12 PCP Gayla Navarro MD       Subjective:   Hitesh Lopez Piperacillin Sod-Tazobactam So (ZOSYN) 3.375 g in dextrose 5 % 100 mL ADD-vantage 3.375 g Intravenous Q8H   Fluconazole in Sodium Chloride (DIFLUCAN) IVPB premix 400 mg 400 mg Intravenous Q24H   Albumin Human (ALBUMINAR) 25 % solution 25 g 25 g Intraveno GFRNAA  47*  51*   --    --    CA  7.9*  8.1*   --    --    NA  134*  136   --    --    K  3.6  3.6   --   3.7  3.6   CL  109  110   --    --    CO2  19*  18*   --    --          Lab Results  Component Value Date   INR 1.6 (H) 03/02/2018   INR 1.3 (H) 03

## 2018-03-13 NOTE — OCCUPATIONAL THERAPY NOTE
OCCUPATIONAL THERAPY TREATMENT NOTE - INPATIENT        Room Number: 468/468-A           Presenting Problem:  (abdominal pain, perferated viscus s/p diagnostic laparoscopy)    Problem List  Principal Problem:    Shock ([address]  Active Problems:    TIMMY (acute k education    SUBJECTIVE  \"I'm not getting up to the chair, I want to rest in bed. \"     OBJECTIVE  Precautions: Bed/chair alarm;Drain(s) (hx of ETOH abuse)    WEIGHT BEARING RESTRICTION  Weight Bearing Restriction: None                PAIN ASSESSMENT  Rat 2018    Patient will tolerate standing for 5 minutes during OFH at sink with SBA   Comment: MET 2018    NEW GOAL : pt will complete toileting with SBA.  Comment: Attempted, however unable to go at this time            Goals  on: 3/22

## 2018-03-13 NOTE — PROGRESS NOTES
[facility]    Progress Note    Serene Perch Patient Status:  Inpatient    1960 MRN [id number]   Location [facility] 4W/SW/SE Attending John Yee MD   [facility] Day # 12 PCP Tami Page MD     Assessment and Plan:     Chavo Mejía TROP 0.04 (HH) 03/01/2018   B12 1,454 (H) 08/10/2017                     Ho Dixon MD  3/13/2018

## 2018-03-13 NOTE — PROGRESS NOTES
INFECTIOUS DISEASE PROGRESS NOTE    Zachary Ortez Patient Status:  Inpatient    1960 MRN [id number]   Location [facility] 4W/SW/SE Attending Lynne Lemus, [address] Day # 12 PCP Gardenia Serra MD     Subjective:  Fever curve improving, Tma of 3.9, lactic acid 9.5, acute kidney injury, hypotension requiring levophed. CT of the abdomen and pelvis with perforated viscus with extensive pneumoperitoneum with moderate to large volume slightly complex ascites.   Patient taken emergently to the OR f

## 2018-03-14 VITALS
TEMPERATURE: 98 F | HEART RATE: 89 BPM | WEIGHT: 195 LBS | SYSTOLIC BLOOD PRESSURE: 120 MMHG | OXYGEN SATURATION: 96 % | BODY MASS INDEX: 27.3 KG/M2 | RESPIRATION RATE: 18 BRPM | DIASTOLIC BLOOD PRESSURE: 75 MMHG | HEIGHT: 70.98 IN

## 2018-03-14 LAB
ANION GAP SERPL CALC-SCNC: 8 MMOL/L (ref 0–18)
BASOPHILS # BLD: 0 K/UL (ref 0–0.2)
BASOPHILS NFR BLD: 0 %
BUN SERPL-MCNC: 2 MG/DL (ref 8–20)
BUN/CREAT SERPL: 1.6 (ref 10–20)
CALCIUM SERPL-MCNC: 8.9 MG/DL (ref 8.5–10.5)
CHLORIDE SERPL-SCNC: 104 MMOL/L (ref 95–110)
CO2 SERPL-SCNC: 26 MMOL/L (ref 22–32)
CREAT SERPL-MCNC: 1.26 MG/DL (ref 0.5–1.5)
EOSINOPHIL # BLD: 0.1 K/UL (ref 0–0.7)
EOSINOPHIL NFR BLD: 1 %
ERYTHROCYTE [DISTWIDTH] IN BLOOD BY AUTOMATED COUNT: 13.5 % (ref 11–15)
GLUCOSE SERPL-MCNC: 107 MG/DL (ref 70–99)
HCT VFR BLD AUTO: 30.1 % (ref 35–48)
HGB BLD-MCNC: 10.3 G/DL (ref 12–16)
LYMPHOCYTES # BLD: 1 K/UL (ref 1–4)
LYMPHOCYTES NFR BLD: 10 %
MAGNESIUM SERPL-MCNC: 1.6 MG/DL (ref 1.8–2.5)
MCH RBC QN AUTO: 32 PG (ref 27–32)
MCHC RBC AUTO-ENTMCNC: 34.2 G/DL (ref 32–37)
MCV RBC AUTO: 93.5 FL (ref 80–100)
MONOCYTES # BLD: 1.3 K/UL (ref 0–1)
MONOCYTES NFR BLD: 13 %
NEUTROPHILS # BLD AUTO: 7.7 K/UL (ref 1.8–7.7)
NEUTROPHILS NFR BLD: 76 %
OSMOLALITY UR CALC.SUM OF ELEC: 283 MOSM/KG (ref 275–295)
PLATELET # BLD AUTO: 498 K/UL (ref 140–400)
PMV BLD AUTO: 8.6 FL (ref 7.4–10.3)
POTASSIUM SERPL-SCNC: 3.7 MMOL/L (ref 3.3–5.1)
RBC # BLD AUTO: 3.22 M/UL (ref 3.7–5.4)
SODIUM SERPL-SCNC: 138 MMOL/L (ref 136–144)
WBC # BLD AUTO: 10.1 K/UL (ref 4–11)

## 2018-03-14 PROCEDURE — 99239 HOSP IP/OBS DSCHRG MGMT >30: CPT | Performed by: HOSPITALIST

## 2018-03-14 RX ORDER — POTASSIUM CHLORIDE 20 MEQ/1
40 TABLET, EXTENDED RELEASE ORAL ONCE
Status: COMPLETED | OUTPATIENT
Start: 2018-03-14 | End: 2018-03-14

## 2018-03-14 RX ORDER — HYDROCODONE BITARTRATE AND ACETAMINOPHEN 5; 325 MG/1; MG/1
1-2 TABLET ORAL EVERY 4 HOURS PRN
Qty: 15 TABLET | Refills: 0 | Status: SHIPPED | OUTPATIENT
Start: 2018-03-14 | End: 2018-03-22 | Stop reason: ALTCHOICE

## 2018-03-14 RX ORDER — MAGNESIUM OXIDE 400 MG (241.3 MG MAGNESIUM) TABLET
400 TABLET ONCE
Status: COMPLETED | OUTPATIENT
Start: 2018-03-14 | End: 2018-03-14

## 2018-03-14 RX ORDER — 0.9 % SODIUM CHLORIDE 0.9 %
VIAL (ML) INJECTION
Status: COMPLETED
Start: 2018-03-14 | End: 2018-03-14

## 2018-03-14 RX ORDER — FUROSEMIDE 10 MG/ML
40 INJECTION INTRAMUSCULAR; INTRAVENOUS ONCE
Status: COMPLETED | OUTPATIENT
Start: 2018-03-14 | End: 2018-03-14

## 2018-03-14 NOTE — PROGRESS NOTES
INFECTIOUS DISEASE PROGRESS NOTE    Lynette Salazar Patient Status:  Inpatient    1960 MRN [id number]   Location [facility] 4W/SW/SE Attending María Elena Holloway, [address] Day # 15 PCP Radha Pichardo MD     Subjective:  Fever curve improving, abd admission with dizziness, loss of consciousness, poor appetite, chills. On admission patient afebrile with WBC of 3.9, lactic acid 9.5, acute kidney injury, hypotension requiring levophed.   CT of the abdomen and pelvis with perforated viscus with extensiv

## 2018-03-14 NOTE — PAYOR COMM NOTE
Subjective:   Pat Alejandro is a(n) 61year old female Pt denies cp , sob.    Sitting in chair comfortably  Tolerating soft diet  Still with loose stools  Ambulating without issue     ROS:   No cp, sob   No n/v      Objective:   Blood pressure 140/75, pulse 90 solution 25 g 25 g Intravenous Once   influenza vaccine split quad (FLULAVAL) ages 6 months to 65 years inj 0.5ml 0.5 mL Intramuscular Prior to discharge            Lab Results  Component Value Date   WBC 10.9 03/12/2018   HGB 10.5 (L) 03/12/2018   HCT 32. diet  - ileus resolved. Now with loose BMs, checking for c.diff  -Increase activity - seen by PT and recommending Skilled rehab. Pt wants to go home. Will consult SW   -Pain control - not taking any morphine   -dc IVF     Streptococcus viridans bacteremia.

## 2018-03-15 ENCOUNTER — TELEPHONE (OUTPATIENT)
Dept: SURGERY | Facility: CLINIC | Age: 58
End: 2018-03-15

## 2018-03-15 ENCOUNTER — TELEPHONE (OUTPATIENT)
Dept: INTERNAL MEDICINE UNIT | Facility: HOSPITAL | Age: 58
End: 2018-03-15

## 2018-03-15 ENCOUNTER — PATIENT OUTREACH (OUTPATIENT)
Dept: CASE MANAGEMENT | Age: 58
End: 2018-03-15

## 2018-03-15 NOTE — TELEPHONE ENCOUNTER
Pt's  called stating pt had surgery 3-1-18. Drain. Logging out put. empty 2 times a day. Pt advised to be seen next week. No appt available on 3-23-18. Can pt be added on the schedule.     Please call to advise

## 2018-03-15 NOTE — DISCHARGE SUMMARY
[facility]    Discharge Summary    Antoinette Cheng Patient Status:  Inpatient    1960 MRN [id number]   Location [facility] 4W/SW/SE Attending No att. providers found   [facility] Day # 15 PCP Dima Patel MD     Date of Admission: normal EF.   - Repeat blood cultures sent and NGTD  - cont iv zosyn and fluconazole, DC abx after today per ID     Acute renal failure.  Baseline creatinine appears to be normal.  Creatinine as high as 2.75.  Trending down now.  Almost certainly due to a c intra-abdominal abscess. 3. Resolution of pneumoperitoneum. 4. Moderate distention of the jejunum and proximal ileum. This probably represents postsurgical ileus. No evidence of bowel wall thickening.  Continued surveillance recommended to rule out small radha emphysema related to the abdominal surgery. Xr Chest Ap Portable  (cpt=71045)    Result Date: 3/1/2018  CONCLUSION:  1. Interval placement of feeding tube with tip in the mid gastric body. 2.  Pneumoperitoneum.   This is better delineated on recent but considered less likely. Surgical assessment is recommended. 2. Marked fatty liver/hepatomegaly. 3. Small to moderate hiatal hernia. 4. Gastroduodenal artery embolization changes.  5. Assessment of the pelvic structures is limited secondary to streak art Specialty    Samina Leon MD Consulting Physician  INFECTIOUS DISEASES     Antonette Pelayo MD Consulting Physician  PULMONARY DISEASES    Earlene Barber MD Consulting Physician  PULMONARY DISEASES    Edgar Zamarripa MD Consulting Physician

## 2018-03-15 NOTE — PROGRESS NOTES
[facility] [phone], [facility] contact information provided.

## 2018-03-15 NOTE — TELEPHONE ENCOUNTER
Contacted patient's , Felymic Ruth. Per Dr. Kylee Mcdaniel last progress note 03/13 patient instructed to f/u for drain care in 1 week. Appt given 03/20/2018 at 0915. [facility] 2nd floor, yellow parking. Eder Miranda verbalized understanding and all questions answered at this time.

## 2018-03-20 ENCOUNTER — OFFICE VISIT (OUTPATIENT)
Dept: SURGERY | Facility: CLINIC | Age: 58
End: 2018-03-20

## 2018-03-20 VITALS — WEIGHT: 195 LBS | BODY MASS INDEX: 27 KG/M2

## 2018-03-20 DIAGNOSIS — K25.1 ACUTE GASTRIC ULCER WITH PERFORATION (HCC): Primary | ICD-10-CM

## 2018-03-20 PROCEDURE — 99024 POSTOP FOLLOW-UP VISIT: CPT | Performed by: SURGERY

## 2018-03-20 PROCEDURE — 99212 OFFICE O/P EST SF 10 MIN: CPT | Performed by: SURGERY

## 2018-03-20 NOTE — PROGRESS NOTES
Postoperative Patient Follow-up      3/20/2018    Eugenio Pritchett 61year old      HPI  Patient presents with:  Post-Op: Diagnostic Laparoscopy, laparoscopic repair of perforated ulcer at distal stomach, insertion of BETINA x 3, laparoscopic peritoneal washing/irri

## 2018-03-22 ENCOUNTER — LAB ENCOUNTER (OUTPATIENT)
Dept: LAB | Age: 58
End: 2018-03-22
Attending: FAMILY MEDICINE
Payer: COMMERCIAL

## 2018-03-22 DIAGNOSIS — R79.89 ABNORMAL THYROID BLOOD TEST: ICD-10-CM

## 2018-03-22 DIAGNOSIS — N17.9 AKI (ACUTE KIDNEY INJURY) (HCC): ICD-10-CM

## 2018-03-22 DIAGNOSIS — E03.9 HYPOTHYROIDISM, UNSPECIFIED TYPE: ICD-10-CM

## 2018-03-22 LAB
ANION GAP SERPL CALC-SCNC: 10 MMOL/L (ref 0–18)
BUN SERPL-MCNC: 8 MG/DL (ref 8–20)
BUN/CREAT SERPL: 8.4 (ref 10–20)
CALCIUM SERPL-MCNC: 9.4 MG/DL (ref 8.5–10.5)
CHLORIDE SERPL-SCNC: 99 MMOL/L (ref 95–110)
CO2 SERPL-SCNC: 29 MMOL/L (ref 22–32)
CREAT SERPL-MCNC: 0.95 MG/DL (ref 0.5–1.5)
GLUCOSE SERPL-MCNC: 120 MG/DL (ref 70–99)
MAGNESIUM SERPL-MCNC: 1.4 MG/DL (ref 1.8–2.5)
OSMOLALITY UR CALC.SUM OF ELEC: 286 MOSM/KG (ref 275–295)
POTASSIUM SERPL-SCNC: 3.9 MMOL/L (ref 3.3–5.1)
SODIUM SERPL-SCNC: 138 MMOL/L (ref 136–144)
T3 SERPL-MCNC: 0.65 NG/ML (ref 0.87–1.78)
T3FREE SERPL-MCNC: 4.07 PG/ML (ref 2.53–4.29)
TSH SERPL-ACNC: 6.91 UIU/ML (ref 0.45–5.33)

## 2018-03-22 PROCEDURE — 84481 FREE ASSAY (FT-3): CPT

## 2018-03-22 PROCEDURE — 36415 COLL VENOUS BLD VENIPUNCTURE: CPT

## 2018-03-22 PROCEDURE — 80048 BASIC METABOLIC PNL TOTAL CA: CPT

## 2018-03-22 PROCEDURE — 83735 ASSAY OF MAGNESIUM: CPT

## 2018-03-22 PROCEDURE — 84443 ASSAY THYROID STIM HORMONE: CPT

## 2018-03-22 PROCEDURE — 84480 ASSAY TRIIODOTHYRONINE (T3): CPT

## 2018-03-22 NOTE — PROGRESS NOTES
Several attempts made to reach the patient with no return call. Patient completed HFU on 3/22/2018. Closing encounter.

## 2018-03-22 NOTE — PROGRESS NOTES
HPI:    Nathalia Cassidy is a 61year old female here today for hospital follow up.    She was discharged from Inpatient hospital[facility]  to Home   Admission Date: 3/1/18   Discharge Date: 3/14/18  Hospital Discharge Diagnoses (since 2/20/2018)  General surgery consulted. Amy Living currently resting.  Denies significant dyspnea.  Admits to ongoing severe lower quadrant pain.  Denies significant nausea, emesis.         DISCHARGE DX:Perforated gastric ulcer.  Status post laparoscopic repair with josé Prescriptions on File Prior to Visit:  Levothyroxine Sodium 100 MCG Oral Tab Take 1 tablet (100 mcg total) by mouth daily. Cyanocobalamin 1000 MCG Oral Cap Take 1,000 mcg by mouth daily.      Current Facility-Administered Medications on File Prior to Visi index is 25.28 kg/m² as calculated from the following:    Height as of 3/5/18: 5' 10.98\" (1.803 m). Weight as of this encounter: 181 lb 3.2 oz (82.2 kg).    /91 (BP Location: Left arm)   Pulse 83   Wt 181 lb 3.2 oz (82.2 kg)   BMI 25.28 kg/m²    G within 2 business days of discharge on date above   Medical Decision Making- Based on service period of discharge to 30 days:   · Number of Possible Diagnoses and/or Management Options: moderate - to high risk  · Amount and/or Complexity of Data to Be Revi

## 2018-03-23 DIAGNOSIS — E03.9 HYPOTHYROIDISM, UNSPECIFIED TYPE: Primary | ICD-10-CM

## 2018-03-23 NOTE — PROGRESS NOTES
Your magnesium levels are still low. Please take supplements that were sent to your pharmacy. Also the thyroid levels are off. Will not adjust your levels yet. Be consistent with taking in the mornings on an empty stomach and will check levels in 3 months.

## 2018-08-27 ENCOUNTER — LAB ENCOUNTER (OUTPATIENT)
Dept: LAB | Age: 58
End: 2018-08-27
Attending: FAMILY MEDICINE
Payer: COMMERCIAL

## 2018-08-27 ENCOUNTER — OFFICE VISIT (OUTPATIENT)
Dept: FAMILY MEDICINE CLINIC | Facility: CLINIC | Age: 58
End: 2018-08-27
Payer: COMMERCIAL

## 2018-08-27 VITALS
BODY MASS INDEX: 26.04 KG/M2 | DIASTOLIC BLOOD PRESSURE: 81 MMHG | HEIGHT: 71 IN | WEIGHT: 186 LBS | HEART RATE: 72 BPM | SYSTOLIC BLOOD PRESSURE: 137 MMHG

## 2018-08-27 DIAGNOSIS — E78.5 HYPERLIPIDEMIA, UNSPECIFIED HYPERLIPIDEMIA TYPE: ICD-10-CM

## 2018-08-27 DIAGNOSIS — E03.9 HYPOTHYROIDISM, UNSPECIFIED TYPE: ICD-10-CM

## 2018-08-27 DIAGNOSIS — E78.5 HYPERLIPIDEMIA, UNSPECIFIED HYPERLIPIDEMIA TYPE: Primary | ICD-10-CM

## 2018-08-27 DIAGNOSIS — F10.10 ALCOHOL ABUSE: ICD-10-CM

## 2018-08-27 DIAGNOSIS — I10 ESSENTIAL HYPERTENSION: ICD-10-CM

## 2018-08-27 LAB
ALBUMIN SERPL BCP-MCNC: 4.4 G/DL (ref 3.5–4.8)
ALBUMIN/GLOB SERPL: 1.3 {RATIO} (ref 1–2)
ALP SERPL-CCNC: 51 U/L (ref 32–100)
ALT SERPL-CCNC: 30 U/L (ref 14–54)
ANION GAP SERPL CALC-SCNC: 10 MMOL/L (ref 0–18)
AST SERPL-CCNC: 32 U/L (ref 15–41)
BASOPHILS # BLD: 0.1 K/UL (ref 0–0.2)
BASOPHILS NFR BLD: 1 %
BILIRUB SERPL-MCNC: 0.7 MG/DL (ref 0.3–1.2)
BUN SERPL-MCNC: 17 MG/DL (ref 8–20)
BUN/CREAT SERPL: 18.9 (ref 10–20)
CALCIUM SERPL-MCNC: 9.8 MG/DL (ref 8.5–10.5)
CHLORIDE SERPL-SCNC: 103 MMOL/L (ref 95–110)
CHOLEST SERPL-MCNC: 215 MG/DL (ref 110–200)
CO2 SERPL-SCNC: 26 MMOL/L (ref 22–32)
CREAT SERPL-MCNC: 0.9 MG/DL (ref 0.5–1.5)
EOSINOPHIL # BLD: 0.1 K/UL (ref 0–0.7)
EOSINOPHIL NFR BLD: 2 %
ERYTHROCYTE [DISTWIDTH] IN BLOOD BY AUTOMATED COUNT: 14.3 % (ref 11–15)
GLOBULIN PLAS-MCNC: 3.3 G/DL (ref 2.5–3.7)
GLUCOSE SERPL-MCNC: 98 MG/DL (ref 70–99)
HCT VFR BLD AUTO: 35.7 % (ref 35–48)
HDLC SERPL-MCNC: 104 MG/DL
HGB BLD-MCNC: 11.8 G/DL (ref 12–16)
LDLC SERPL CALC-MCNC: 96 MG/DL (ref 0–99)
LYMPHOCYTES # BLD: 1.4 K/UL (ref 1–4)
LYMPHOCYTES NFR BLD: 29 %
MAGNESIUM SERPL-MCNC: 1.9 MG/DL (ref 1.8–2.5)
MCH RBC QN AUTO: 31.5 PG (ref 27–32)
MCHC RBC AUTO-ENTMCNC: 33.1 G/DL (ref 32–37)
MCV RBC AUTO: 95.4 FL (ref 80–100)
MONOCYTES # BLD: 0.8 K/UL (ref 0–1)
MONOCYTES NFR BLD: 17 %
NEUTROPHILS # BLD AUTO: 2.6 K/UL (ref 1.8–7.7)
NEUTROPHILS NFR BLD: 52 %
NONHDLC SERPL-MCNC: 111 MG/DL
OSMOLALITY UR CALC.SUM OF ELEC: 290 MOSM/KG (ref 275–295)
PATIENT FASTING: YES
PLATELET # BLD AUTO: 353 K/UL (ref 140–400)
PMV BLD AUTO: 8.5 FL (ref 7.4–10.3)
POTASSIUM SERPL-SCNC: 4.1 MMOL/L (ref 3.3–5.1)
PROT SERPL-MCNC: 7.7 G/DL (ref 5.9–8.4)
RBC # BLD AUTO: 3.74 M/UL (ref 3.7–5.4)
SODIUM SERPL-SCNC: 139 MMOL/L (ref 136–144)
TRIGL SERPL-MCNC: 74 MG/DL (ref 1–149)
TSH SERPL-ACNC: 3.33 UIU/ML (ref 0.45–5.33)
VIT B12 SERPL-MCNC: 1451 PG/ML (ref 181–914)
WBC # BLD AUTO: 5 K/UL (ref 4–11)

## 2018-08-27 PROCEDURE — 36415 COLL VENOUS BLD VENIPUNCTURE: CPT

## 2018-08-27 PROCEDURE — 85025 COMPLETE CBC W/AUTO DIFF WBC: CPT

## 2018-08-27 PROCEDURE — 80053 COMPREHEN METABOLIC PANEL: CPT

## 2018-08-27 PROCEDURE — 80061 LIPID PANEL: CPT

## 2018-08-27 PROCEDURE — 83735 ASSAY OF MAGNESIUM: CPT

## 2018-08-27 PROCEDURE — 99212 OFFICE O/P EST SF 10 MIN: CPT | Performed by: FAMILY MEDICINE

## 2018-08-27 PROCEDURE — 82607 VITAMIN B-12: CPT

## 2018-08-27 PROCEDURE — 99214 OFFICE O/P EST MOD 30 MIN: CPT | Performed by: FAMILY MEDICINE

## 2018-08-27 PROCEDURE — 84443 ASSAY THYROID STIM HORMONE: CPT

## 2018-08-27 RX ORDER — LEVOTHYROXINE SODIUM 0.1 MG/1
100 TABLET ORAL DAILY
Qty: 90 TABLET | Refills: 4 | Status: SHIPPED | OUTPATIENT
Start: 2018-08-27 | End: 2019-07-03

## 2018-08-27 RX ORDER — AMLODIPINE BESYLATE 10 MG/1
10 TABLET ORAL DAILY
Qty: 90 TABLET | Refills: 4 | Status: SHIPPED | OUTPATIENT
Start: 2018-08-27 | End: 2019-07-03

## 2018-08-27 RX ORDER — LOSARTAN POTASSIUM 100 MG/1
100 TABLET ORAL DAILY
Qty: 90 TABLET | Refills: 4 | Status: SHIPPED | OUTPATIENT
Start: 2018-08-27 | End: 2019-07-03

## 2018-08-27 RX ORDER — LOSARTAN POTASSIUM 100 MG/1
TABLET ORAL
COMMUNITY
Start: 2018-07-17 | End: 2018-08-27

## 2018-08-27 NOTE — PROGRESS NOTES
Mick Finch is a 60year old female. Patient presents with:  Medication Request  Complete Form    HPI:   Here for regular follow up. Reports everything stable. Still walking a few miles a day every day - usually 6 miles a day.    Still drinking alcohol regul 25.94 kg/m²   GENERAL: well developed, well nourished,in no apparent distress  SKIN: no rashes,no suspicious lesions  NECK: supple,no adenopathy,no bruits  LUNGS: clear to auscultation  CARDIO: RRR without murmur  EXTREMITIES: no cyanosis, clubbing or chacha

## 2018-08-29 ENCOUNTER — TELEPHONE (OUTPATIENT)
Dept: FAMILY MEDICINE CLINIC | Facility: CLINIC | Age: 58
End: 2018-08-29

## 2018-08-29 NOTE — PROGRESS NOTES
Please fill out forms for her work and fax in. - in my folder at [address] [facility].    Normal labs - informed pt through [employer]

## 2018-08-29 NOTE — TELEPHONE ENCOUNTER
Dr. Flower Lambert received and completed Health Provider Screening form. Form faxed to [phone] confirmation received.

## 2018-09-17 ENCOUNTER — APPOINTMENT (OUTPATIENT)
Dept: GENERAL RADIOLOGY | Facility: HOSPITAL | Age: 58
DRG: 378 | End: 2018-09-17
Attending: EMERGENCY MEDICINE
Payer: COMMERCIAL

## 2018-09-17 ENCOUNTER — ANESTHESIA EVENT (OUTPATIENT)
Dept: ENDOSCOPY | Facility: HOSPITAL | Age: 58
DRG: 378 | End: 2018-09-17
Payer: COMMERCIAL

## 2018-09-17 ENCOUNTER — HOSPITAL ENCOUNTER (INPATIENT)
Facility: HOSPITAL | Age: 58
LOS: 3 days | Discharge: HOME | DRG: 378 | End: 2018-09-20
Attending: EMERGENCY MEDICINE | Admitting: HOSPITALIST
Payer: COMMERCIAL

## 2018-09-17 ENCOUNTER — ANESTHESIA (OUTPATIENT)
Dept: ENDOSCOPY | Facility: HOSPITAL | Age: 58
DRG: 378 | End: 2018-09-17
Payer: COMMERCIAL

## 2018-09-17 DIAGNOSIS — K92.2 GASTROINTESTINAL HEMORRHAGE, UNSPECIFIED GASTROINTESTINAL HEMORRHAGE TYPE: Primary | ICD-10-CM

## 2018-09-17 LAB
ANION GAP SERPL CALC-SCNC: 17 MMOL/L (ref 0–18)
ANTIBODY SCREEN: NEGATIVE
BASOPHILS # BLD: 0 K/UL (ref 0–0.2)
BASOPHILS NFR BLD: 0 %
BILIRUB UR QL: NEGATIVE
BUN SERPL-MCNC: 74 MG/DL (ref 8–20)
BUN/CREAT SERPL: 43.5 (ref 10–20)
CALCIUM SERPL-MCNC: 9.4 MG/DL (ref 8.5–10.5)
CHLORIDE SERPL-SCNC: 100 MMOL/L (ref 95–110)
CLARITY UR: CLEAR
CO2 SERPL-SCNC: 18 MMOL/L (ref 22–32)
COLOR UR: YELLOW
CREAT SERPL-MCNC: 1.7 MG/DL (ref 0.5–1.5)
EOSINOPHIL # BLD: 0 K/UL (ref 0–0.7)
EOSINOPHIL NFR BLD: 0 %
ERYTHROCYTE [DISTWIDTH] IN BLOOD BY AUTOMATED COUNT: 14.6 % (ref 11–15)
GLUCOSE SERPL-MCNC: 274 MG/DL (ref 70–99)
GLUCOSE UR-MCNC: NEGATIVE MG/DL
HCT VFR BLD AUTO: 25.5 % (ref 35–48)
HCT VFR BLD AUTO: 26.6 % (ref 35–48)
HCT VFR BLD AUTO: 28.7 % (ref 35–48)
HGB BLD-MCNC: 8.4 G/DL (ref 12–16)
HGB BLD-MCNC: 8.7 G/DL (ref 12–16)
HGB BLD-MCNC: 9.2 G/DL (ref 12–16)
HGB UR QL STRIP.AUTO: NEGATIVE
INR BLD: 1.2 (ref 0.9–1.2)
KETONES UR-MCNC: NEGATIVE MG/DL
LEUKOCYTE ESTERASE UR QL STRIP.AUTO: NEGATIVE
LYMPHOCYTES # BLD: 0.9 K/UL (ref 1–4)
LYMPHOCYTES NFR BLD: 6 %
MCH RBC QN AUTO: 30.6 PG (ref 27–32)
MCHC RBC AUTO-ENTMCNC: 32.1 G/DL (ref 32–37)
MCV RBC AUTO: 95.4 FL (ref 80–100)
MONOCYTES # BLD: 1 K/UL (ref 0–1)
MONOCYTES NFR BLD: 6 %
NEUTROPHILS # BLD AUTO: 13.1 K/UL (ref 1.8–7.7)
NEUTROPHILS NFR BLD: 88 %
NITRITE UR QL STRIP.AUTO: NEGATIVE
OSMOLALITY UR CALC.SUM OF ELEC: 312 MOSM/KG (ref 275–295)
PH UR: 6 [PH] (ref 5–8)
PLATELET # BLD AUTO: 282 K/UL (ref 140–400)
PMV BLD AUTO: 8.8 FL (ref 7.4–10.3)
POTASSIUM SERPL-SCNC: 3.6 MMOL/L (ref 3.3–5.1)
PROT UR-MCNC: NEGATIVE MG/DL
PROTHROMBIN TIME: 14.4 SECONDS (ref 11.8–14.5)
RBC # BLD AUTO: 3.01 M/UL (ref 3.7–5.4)
RH BLOOD TYPE: POSITIVE
SODIUM SERPL-SCNC: 135 MMOL/L (ref 136–144)
SP GR UR STRIP: 1.02 (ref 1–1.03)
UROBILINOGEN UR STRIP-ACNC: <2
VIT C UR-MCNC: NEGATIVE MG/DL
WBC # BLD AUTO: 15 K/UL (ref 4–11)

## 2018-09-17 PROCEDURE — 74018 RADEX ABDOMEN 1 VIEW: CPT | Performed by: EMERGENCY MEDICINE

## 2018-09-17 PROCEDURE — 99223 1ST HOSP IP/OBS HIGH 75: CPT | Performed by: INTERNAL MEDICINE

## 2018-09-17 PROCEDURE — 0DB68ZX EXCISION OF STOMACH, VIA NATURAL OR ARTIFICIAL OPENING ENDOSCOPIC, DIAGNOSTIC: ICD-10-PCS | Performed by: INTERNAL MEDICINE

## 2018-09-17 PROCEDURE — 0W3P8ZZ CONTROL BLEEDING IN GASTROINTESTINAL TRACT, VIA NATURAL OR ARTIFICIAL OPENING ENDOSCOPIC: ICD-10-PCS | Performed by: INTERNAL MEDICINE

## 2018-09-17 PROCEDURE — 43239 EGD BIOPSY SINGLE/MULTIPLE: CPT | Performed by: INTERNAL MEDICINE

## 2018-09-17 PROCEDURE — 99223 1ST HOSP IP/OBS HIGH 75: CPT | Performed by: HOSPITALIST

## 2018-09-17 PROCEDURE — 43255 EGD CONTROL BLEEDING ANY: CPT | Performed by: INTERNAL MEDICINE

## 2018-09-17 PROCEDURE — 3E0G8GC INTRODUCTION OF OTHER THERAPEUTIC SUBSTANCE INTO UPPER GI, VIA NATURAL OR ARTIFICIAL OPENING ENDOSCOPIC: ICD-10-PCS | Performed by: INTERNAL MEDICINE

## 2018-09-17 PROCEDURE — 74021 RADEX ABDOMEN 3+ VIEWS: CPT | Performed by: EMERGENCY MEDICINE

## 2018-09-17 RX ORDER — SODIUM CHLORIDE, SODIUM LACTATE, POTASSIUM CHLORIDE, CALCIUM CHLORIDE 600; 310; 30; 20 MG/100ML; MG/100ML; MG/100ML; MG/100ML
INJECTION, SOLUTION INTRAVENOUS CONTINUOUS PRN
Status: DISCONTINUED | OUTPATIENT
Start: 2018-09-17 | End: 2018-09-17 | Stop reason: SURG

## 2018-09-17 RX ORDER — SODIUM CHLORIDE 0.9 % (FLUSH) 0.9 %
3 SYRINGE (ML) INJECTION AS NEEDED
Status: DISCONTINUED | OUTPATIENT
Start: 2018-09-17 | End: 2018-09-20

## 2018-09-17 RX ORDER — SODIUM CHLORIDE 9 MG/ML
INJECTION, SOLUTION INTRAVENOUS CONTINUOUS
Status: ACTIVE | OUTPATIENT
Start: 2018-09-17 | End: 2018-09-17

## 2018-09-17 RX ORDER — ACETAMINOPHEN 325 MG/1
650 TABLET ORAL EVERY 6 HOURS PRN
Status: DISCONTINUED | OUTPATIENT
Start: 2018-09-17 | End: 2018-09-20

## 2018-09-17 RX ORDER — METOCLOPRAMIDE HYDROCHLORIDE 5 MG/ML
10 INJECTION INTRAMUSCULAR; INTRAVENOUS EVERY 8 HOURS PRN
Status: DISCONTINUED | OUTPATIENT
Start: 2018-09-17 | End: 2018-09-20

## 2018-09-17 RX ORDER — LORAZEPAM 2 MG/ML
1 INJECTION INTRAMUSCULAR EVERY 6 HOURS PRN
Status: DISCONTINUED | OUTPATIENT
Start: 2018-09-17 | End: 2018-09-18

## 2018-09-17 RX ORDER — ONDANSETRON 2 MG/ML
4 INJECTION INTRAMUSCULAR; INTRAVENOUS EVERY 6 HOURS PRN
Status: DISCONTINUED | OUTPATIENT
Start: 2018-09-17 | End: 2018-09-20

## 2018-09-17 RX ORDER — LIDOCAINE HYDROCHLORIDE 10 MG/ML
INJECTION, SOLUTION EPIDURAL; INFILTRATION; INTRACAUDAL; PERINEURAL AS NEEDED
Status: DISCONTINUED | OUTPATIENT
Start: 2018-09-17 | End: 2018-09-17

## 2018-09-17 RX ORDER — LIDOCAINE HYDROCHLORIDE 10 MG/ML
INJECTION, SOLUTION EPIDURAL; INFILTRATION; INTRACAUDAL; PERINEURAL AS NEEDED
Status: DISCONTINUED | OUTPATIENT
Start: 2018-09-17 | End: 2018-09-17 | Stop reason: SURG

## 2018-09-17 RX ORDER — SODIUM CHLORIDE, SODIUM LACTATE, POTASSIUM CHLORIDE, CALCIUM CHLORIDE 600; 310; 30; 20 MG/100ML; MG/100ML; MG/100ML; MG/100ML
INJECTION, SOLUTION INTRAVENOUS CONTINUOUS
Status: DISCONTINUED | OUTPATIENT
Start: 2018-09-17 | End: 2018-09-19

## 2018-09-17 RX ORDER — NALOXONE HYDROCHLORIDE 0.4 MG/ML
80 INJECTION, SOLUTION INTRAMUSCULAR; INTRAVENOUS; SUBCUTANEOUS AS NEEDED
Status: DISCONTINUED | OUTPATIENT
Start: 2018-09-17 | End: 2018-09-17 | Stop reason: HOSPADM

## 2018-09-17 RX ORDER — DEXTROSE MONOHYDRATE 25 G/50ML
50 INJECTION, SOLUTION INTRAVENOUS
Status: DISCONTINUED | OUTPATIENT
Start: 2018-09-17 | End: 2018-09-17 | Stop reason: HOSPADM

## 2018-09-17 RX ORDER — SODIUM CHLORIDE 9 MG/ML
INJECTION, SOLUTION INTRAVENOUS CONTINUOUS
Status: DISCONTINUED | OUTPATIENT
Start: 2018-09-17 | End: 2018-09-19

## 2018-09-17 RX ORDER — ONDANSETRON 2 MG/ML
4 INJECTION INTRAMUSCULAR; INTRAVENOUS EVERY 4 HOURS PRN
Status: DISCONTINUED | OUTPATIENT
Start: 2018-09-17 | End: 2018-09-20

## 2018-09-17 RX ORDER — GLYCOPYRROLATE 0.2 MG/ML
INJECTION, SOLUTION INTRAMUSCULAR; INTRAVENOUS AS NEEDED
Status: DISCONTINUED | OUTPATIENT
Start: 2018-09-17 | End: 2018-09-17 | Stop reason: SURG

## 2018-09-17 RX ADMIN — SODIUM CHLORIDE, SODIUM LACTATE, POTASSIUM CHLORIDE, CALCIUM CHLORIDE: 600; 310; 30; 20 INJECTION, SOLUTION INTRAVENOUS at 12:39:00

## 2018-09-17 RX ADMIN — GLYCOPYRROLATE 0.2 MG: 0.2 INJECTION, SOLUTION INTRAMUSCULAR; INTRAVENOUS at 12:08:00

## 2018-09-17 RX ADMIN — LIDOCAINE HYDROCHLORIDE 100 MG: 10 INJECTION, SOLUTION EPIDURAL; INFILTRATION; INTRACAUDAL; PERINEURAL at 12:08:00

## 2018-09-17 RX ADMIN — SODIUM CHLORIDE, SODIUM LACTATE, POTASSIUM CHLORIDE, CALCIUM CHLORIDE: 600; 310; 30; 20 INJECTION, SOLUTION INTRAVENOUS at 12:06:00

## 2018-09-17 RX ADMIN — LIDOCAINE HYDROCHLORIDE 50 MG: 10 INJECTION, SOLUTION EPIDURAL; INFILTRATION; INTRACAUDAL; PERINEURAL at 12:20:00

## 2018-09-17 NOTE — H&P
History & Physical Examination    Patient Name: Jose Yun  MRN: [id number]  [facility]: [id number]  YOB: 1960    Diagnosis: GI bleed      Facility-Administered Medications Prior to Admission:  cyanocobalamin (VITAMIN B12) 1000 MCG/ML injection 1,000 • Osteoarthritis of left hip 2010   • Unspecified essential hypertension    • Visual impairment      Past Surgical History:  03/01/2018: COLON SURGERY  05-: ELECTROCARDIOGRAM, COMPLETE      Comment:  Scanned to Media Tab - Date of Service 05-24-20

## 2018-09-17 NOTE — ED PROVIDER NOTES
Patient Seen in: [facility] AND [facility] Emergency Department    History   Patient presents with:  Dizziness (neurologic)      HPI    Patient presents to the ED complaining of dizziness with standing or getting up and black diarrhea for the past 24 hours.   Armond Cardenas Not on file    Social Needs      Financial resource strain: Not on file      Food insecurity - worry: Not on file      Food insecurity - inability: Not on file      Transportation needs - medical: Not on file      Transportation needs - non-medical: Not on pulses. Tachycardic   Pulmonary/Chest: Effort normal. No stridor. No respiratory distress. Abdominal: Soft. She exhibits no distension. There is no tenderness. There is no rebound and no guarding.    Genitourinary: Rectal exam shows guaiac positive stoo -----------         ------                     82 Amie Dupree (BLOOD [facility])[[id number]]                                                          ANTIBODY SCREEN[[id number]]                                                               Please view results evaluation. ED Course: Patient presents to the ED with symptoms of GI bleeding, tachycardic on arrival, guaiac positive stool. Laboratory testing sent, evidence for significant anemia decreased to 9.2 from 11.8 three weeks ago.   Patient started on IV f

## 2018-09-17 NOTE — H&P
[facility]  History & Physical     Jones Poole  : 1960    Status: Emergency  Day #: 0    Attending: Kelly Madden MD  PCP: Isidra Fitzpatrick MD     Date of Encounter:  2018  Date of Admission:  2018     Chief Complaint: dizziness Smoking status: Never Smoker      Smokeless tobacco: Never Used    Alcohol use: Yes      Alcohol/week: 6.6 oz      Types: 11 Glasses of wine per week      Comment: daily     Drug use: No      Allergies: No Known Allergies    Medications :    AmLODIPine Be Guille Pelayo MD on 9/17/2018 at 7:33     Approved by (CST): Guille Pelayo MD on 9/17/2018 at 7:39          [employer] (DTI=[id number])    Result Date: 9/17/2018  CONCLUSION:   No pneumoperitoneum or obstruction.      Dictated by (CST): Donavon

## 2018-09-17 NOTE — ANESTHESIA POSTPROCEDURE EVALUATION
Patient: Dinesh Ríos    Procedure Summary     Date:  09/17/18 Room / Location:  [address] [place] [address] ENDOSCOPY 05 / [address] [place] [address] ENDOSCOPY    Anesthesia Start:  1664 Anesthesia Stop:      Procedure:  ESOPHAGOGASTRODUODENOSCOPY (EGD) (N/A ) Diagnosis:       Gastrointestinal hemorrhag

## 2018-09-17 NOTE — PLAN OF CARE
Dr. Taco Vargas was paged due to patient stating she drinks 1.5 L of wine a night and is afraid she will start to go through withdrawal. Ativan was ordered; no CIWA ordered at this time. Patient remains calm and cooperative. Will continue to monitor patient.

## 2018-09-17 NOTE — OPERATIVE REPORT
[facility]    PATIENT'S NAME: Alistair Triplett   ATTENDING PHYSICIAN: Jonathan James.  Mary Ojeda MD   OPERATING PHYSICIAN: Jax Lares MD   PATIENT ACCOUNT#:   [de-identified]    LOCATION:  [address] [place] [address] #:   [id number]       DATE OF BIR viewed in its entirety. In the gastric antrum, there were surgical clips on the inferior border of the stomach with a slight divot-appearing post surgical change  in the gastric antrum, but there was no active bleeding there or ulceration.   TAHMINA test was t recommend PCU transfer for closer observation. Monitor H and H frequently; next ordered for 4 p.m. If continued dropping then would transfuse to keep hemoglobin greater than 8 in this situation.   We will increase Protonix to continuous IV infusion, 8mg /

## 2018-09-17 NOTE — CONSULTS
GI CONSULTATION:  Available medical records reviewed. Patient interviewed and examined. Please see orders and transcription. ( Dictated [id number]). Patient will be going for urgent EGD at noon.   Erythromycin will be given prior to procedure to clear stoma

## 2018-09-17 NOTE — PAYOR COMM NOTE
--------------  ADMISSION REVIEW     Payor: [address] #:  [id number]  Authorization Number: [id number]    Admit date: 9/17/18  Admit time: [address]       Admitting Physician: Kyle Calderon MD  Attending Physician:  Renetta Longoria Patient presents to the ED complaining of dizziness with standing or getting up and black diarrhea for the past 24 hours. Nausea ×3 days, denies abdominal pain or other complaints.   Patient concerned about a GI bleed as she has a history of a perforated d Food insecurity - inability: Not on file      Transportation needs - medical: Not on file      Transportation needs - non-medical: Not on file    Occupational History      Not on file    Tobacco Use      Smoking status: Never Smoker      Smokeless toba Abdominal: Soft. She exhibits no distension. There is no tenderness. There is no rebound and no guarding. Genitourinary: Rectal exam shows guaiac positive stool.    Genitourinary Comments: Black stool, guaiac positive   Musculoskeletal: She exhibits no ed [address] (BLOOD ASPG)[[id number]]                                                          ANTIBODY SCREEN[[id number]]                                                               Please view results for these tests on the individual orders.    [address] (BLOOD TYP ED Course: Patient presents to the ED with symptoms of GI bleeding, tachycardic on arrival, guaiac positive stool. Laboratory testing sent, evidence for significant anemia decreased to 9.2 from 11.8 three weeks ago.   Patient started on IV fluids and IV Pr AmLODIPine Besylate 10 MG Oral Tab Take 1 tablet (10 mg total) by mouth daily. Disp: 90 tablet Rfl: 4 9/16/2018 at Unknown time   Levothyroxine Sodium 100 MCG Oral Tab Take 1 tablet (100 mcg total) by mouth daily.  Disp: 90 tablet Rfl: 4 9/16/2018 at Unknow Comment:  left hip replacement  2013: HIP REPLACEMENT SURGERY;  Right      Comment:  right hip replacement  3/1/2018: LAPAROSCOPIC PERFORATED ULCER REPAIR; N/A      Comment:  Performed by Karissa Avila MD at [address] [place] [address]  09/28/15: OTHER; Right 9/17/2018 1220 Given 50 mg Intravenous Caryle March, CRNA    9/17/2018 1208 Given 100 mg Intravenous Caryle March, CRNA      Normal Saline Flush 0.9 % injection 3 mL     Date Action Dose Route User    9/17/2018 1015 Given 3 mL Intravenous [place], Dictation Number:   [id number]     Lali Reyna MD  9/17/2018  12:02 PM

## 2018-09-17 NOTE — ED INITIAL ASSESSMENT (HPI)
Ange Rousseau is here for eval of dizziness and black diarrhea since last night and nausea x 3 days.

## 2018-09-17 NOTE — ANESTHESIA PREPROCEDURE EVALUATION
Anesthesia PreOp Note    HPI:     Lanie Lloyd is a 60year old female who presents for preoperative consultation requested by: Cayla Goddard MD    Date of Surgery: 9/17/2018    Procedure(s):  ESOPHAGOGASTRODUODENOSCOPY (EGD)  Indication: gi bl Right      Comment:  ORIF patella  No date: TOTAL HIP REPLACEMENT  2012: WRIST FRACTURE SURGERY;  Left      Facility-Administered Medications Prior to Admission:  cyanocobalamin (VITAMIN B12) 1000 MCG/ML injection 1,000 mcg 1,000 mcg Intramuscular Q30 Days Rate: 166.7 mL/hr at 09/17/18 1013 1,000 mL at 09/17/18 1013     No current [facility]-ordered outpatient medications on file.     No Known Allergies    Family History   Problem Relation Age of Onset   • Arthritis Mother    • Heart Disease Father         Valve re Results   Component Value Date     (L) 09/17/2018    K 3.6 09/17/2018     09/17/2018    CO2 18 (L) 09/17/2018    BUN 74 (H) 09/17/2018    CREATSERUM 1.70 (H) 09/17/2018     (H) 09/17/2018    CA 9.4 09/17/2018     Lab Results   Component

## 2018-09-17 NOTE — BRIEF OP NOTE
Pre-Operative Diagnosis: GI bleed     Post-Operative Diagnosis: Actively bleeding duodenal bulb ulcer with visible vessel, surgical changes antrum of stomach     Procedure Performed:   Procedure(s):  Esophagogastroduodenoscopy with injection epinephrine, B

## 2018-09-17 NOTE — CONSULTS
[facility]    PATIENT'S NAME: Viviana Sirgabriele   ATTENDING PHYSICIAN: Sara Funk.  Jonathan Sharpe MD   CONSULTING PHYSICIAN: Harold Pallas, MD   PATIENT ACCOUNT#:   [de-identified]    LOCATION:  [address] [place] [address] Road #:   [id number]       DATE OF B and then finally emesis of bloody emesis. She has not been on PPI and states that she has been noncompliant with followup with GI. She has been seeing Dr. Heath Brannon, however. She denies any of the antecedent abdominal pain.   She has been avoiding NSAIDs comp thyromegaly, or adenopathy. LUNGS:  Clear to auscultation and percussion posteriorly. BACK:  No spinal or CVA tenderness. HEART:  S1, S2. Tachycardic. No S3, S4, murmur. Of note, no IV is running. ABDOMEN:  Well-healed laparoscopic scars.   Soft, non above.    3.   History of gastric outlet obstruction as above. 4.   Alcohol abuse as above. 5.   Colorectal screening, patient has never undergone. Thank you for this request for consultation.     Dictated By Ranjeet Darling MD  d: 09/17/

## 2018-09-18 LAB
ALBUMIN SERPL BCP-MCNC: 3.1 G/DL (ref 3.5–4.8)
ALP SERPL-CCNC: 34 U/L (ref 32–100)
ALT SERPL-CCNC: 18 U/L (ref 14–54)
ANION GAP SERPL CALC-SCNC: 4 MMOL/L (ref 0–18)
AST SERPL-CCNC: 18 U/L (ref 15–41)
BASOPHILS # BLD: 0 K/UL (ref 0–0.2)
BASOPHILS NFR BLD: 1 %
BILIRUB DIRECT SERPL-MCNC: 0.2 MG/DL (ref 0–0.2)
BILIRUB SERPL-MCNC: 0.9 MG/DL (ref 0.3–1.2)
BUN SERPL-MCNC: 38 MG/DL (ref 8–20)
BUN/CREAT SERPL: 40.4 (ref 10–20)
CALCIUM SERPL-MCNC: 8.5 MG/DL (ref 8.5–10.5)
CHLORIDE SERPL-SCNC: 111 MMOL/L (ref 95–110)
CLO TEST: NEGATIVE
CO2 SERPL-SCNC: 25 MMOL/L (ref 22–32)
CREAT SERPL-MCNC: 0.94 MG/DL (ref 0.5–1.5)
EOSINOPHIL # BLD: 0.1 K/UL (ref 0–0.7)
EOSINOPHIL NFR BLD: 1 %
ERYTHROCYTE [DISTWIDTH] IN BLOOD BY AUTOMATED COUNT: 14.5 % (ref 11–15)
GLUCOSE SERPL-MCNC: 95 MG/DL (ref 70–99)
HCT VFR BLD AUTO: 22.2 % (ref 35–48)
HCT VFR BLD AUTO: 23 % (ref 35–48)
HGB BLD-MCNC: 7.5 G/DL (ref 12–16)
HGB BLD-MCNC: 7.6 G/DL (ref 12–16)
LYMPHOCYTES # BLD: 1.6 K/UL (ref 1–4)
LYMPHOCYTES NFR BLD: 31 %
MCH RBC QN AUTO: 32.3 PG (ref 27–32)
MCHC RBC AUTO-ENTMCNC: 33.7 G/DL (ref 32–37)
MCV RBC AUTO: 95.7 FL (ref 80–100)
MONOCYTES # BLD: 0.6 K/UL (ref 0–1)
MONOCYTES NFR BLD: 11 %
NEUTROPHILS # BLD AUTO: 3 K/UL (ref 1.8–7.7)
NEUTROPHILS NFR BLD: 57 %
OSMOLALITY UR CALC.SUM OF ELEC: 299 MOSM/KG (ref 275–295)
PLATELET # BLD AUTO: 193 K/UL (ref 140–400)
PMV BLD AUTO: 8.8 FL (ref 7.4–10.3)
POTASSIUM SERPL-SCNC: 4.2 MMOL/L (ref 3.3–5.1)
PROT SERPL-MCNC: 5.6 G/DL (ref 5.9–8.4)
RBC # BLD AUTO: 2.32 M/UL (ref 3.7–5.4)
SODIUM SERPL-SCNC: 140 MMOL/L (ref 136–144)
WBC # BLD AUTO: 5.2 K/UL (ref 4–11)

## 2018-09-18 PROCEDURE — 99232 SBSQ HOSP IP/OBS MODERATE 35: CPT | Performed by: INTERNAL MEDICINE

## 2018-09-18 PROCEDURE — 99233 SBSQ HOSP IP/OBS HIGH 50: CPT | Performed by: HOSPITALIST

## 2018-09-18 PROCEDURE — 43236 UPPR GI SCOPE W/SUBMUC INJ: CPT | Performed by: INTERNAL MEDICINE

## 2018-09-18 PROCEDURE — 43239 EGD BIOPSY SINGLE/MULTIPLE: CPT | Performed by: INTERNAL MEDICINE

## 2018-09-18 RX ORDER — MAGNESIUM OXIDE 400 MG (241.3 MG MAGNESIUM) TABLET
400 TABLET DAILY
Status: DISCONTINUED | OUTPATIENT
Start: 2018-09-18 | End: 2018-09-20

## 2018-09-18 RX ORDER — CHOLECALCIFEROL (VITAMIN D3) 125 MCG
1000 CAPSULE ORAL DAILY
Status: DISCONTINUED | OUTPATIENT
Start: 2018-09-18 | End: 2018-09-20

## 2018-09-18 RX ORDER — LORAZEPAM 2 MG/ML
1 INJECTION INTRAMUSCULAR EVERY 4 HOURS PRN
Status: DISCONTINUED | OUTPATIENT
Start: 2018-09-18 | End: 2018-09-20

## 2018-09-18 RX ORDER — LEVOTHYROXINE SODIUM 0.05 MG/1
100 TABLET ORAL
Status: DISCONTINUED | OUTPATIENT
Start: 2018-09-18 | End: 2018-09-20

## 2018-09-18 RX ORDER — LOSARTAN POTASSIUM 100 MG/1
100 TABLET ORAL DAILY
Status: DISCONTINUED | OUTPATIENT
Start: 2018-09-18 | End: 2018-09-20

## 2018-09-18 RX ORDER — AMLODIPINE BESYLATE 10 MG/1
10 TABLET ORAL DAILY
Status: DISCONTINUED | OUTPATIENT
Start: 2018-09-18 | End: 2018-09-20

## 2018-09-18 NOTE — PROGRESS NOTES
[facility]     Gastroenterology Progress Note    Gunnar Nava Patient Status:  Inpatient    1960 MRN [id number]   Location [facility] 2W/SW Attending Johana Squires MD   Hosp Day # 1 PCP Jack Vargas MD       Assess distention, anal bleeding and rectal pain. Endocrine: Negative for cold intolerance and heat intolerance. Genitourinary: Negative for dysuria, urgency, frequency, hematuria, flank pain, difficulty urinating and menstrual problem.    Musculoskeletal: Neg Oropharynx is clear and moist. No oropharyngeal exudate. Eyes: Conjunctivae and EOM are normal. Pupils are equal, round, and reactive to light. Right eye exhibits no discharge. Left eye exhibits no discharge. No scleral icterus.    Neck: Normal range of m >60   CA  9.4  8.5   NA  135*  140   K  3.6  4.2   CL  100  111*   CO2  18*  25       Lab Results   Component Value Date    INR 1.2 09/17/2018    INR 1.6 (H) 03/02/2018    INR 1.3 (H) 03/01/2018       No results for input(s): HCV in the last 168 hours.

## 2018-09-18 NOTE — PROGRESS NOTES
[facility]    Progress Note    Nathalia Cassidy Patient Status:  Inpatient    1960 MRN [id number]   Location [facility] 2W/SW Attending Javi Beckwith MD   Hosp Day # 1 PCP Marilin Patel MD       Subjective:   Nathalia Cassidy is a(n) Idania Shipley MD on 9/17/2018 at 7:33     Approved by (CST): Trevin Leal MD on 9/17/2018 at 7:39          [employer] ([id number])    Result Date: 9/17/2018  CONCLUSION:   No pneumoperitoneum or obstruction.      Dictated by (CST): Art Vallecillo MD

## 2018-09-19 LAB
ALBUMIN SERPL BCP-MCNC: 3.5 G/DL (ref 3.5–4.8)
ALBUMIN/GLOB SERPL: 1.5 {RATIO} (ref 1–2)
ALP SERPL-CCNC: 39 U/L (ref 32–100)
ALT SERPL-CCNC: 17 U/L (ref 14–54)
ANION GAP SERPL CALC-SCNC: 6 MMOL/L (ref 0–18)
ANION GAP SERPL CALC-SCNC: 7 MMOL/L (ref 0–18)
APTT PPP: 24.5 SECONDS (ref 23.2–35.3)
AST SERPL-CCNC: 19 U/L (ref 15–41)
BASOPHILS # BLD: 0 K/UL (ref 0–0.2)
BASOPHILS NFR BLD: 1 %
BILIRUB SERPL-MCNC: 0.9 MG/DL (ref 0.3–1.2)
BUN SERPL-MCNC: 16 MG/DL (ref 8–20)
BUN SERPL-MCNC: 18 MG/DL (ref 8–20)
BUN/CREAT SERPL: 18.4 (ref 10–20)
BUN/CREAT SERPL: 22 (ref 10–20)
C DIFF TOX B STL QL: NEGATIVE
CALCIUM SERPL-MCNC: 8.6 MG/DL (ref 8.5–10.5)
CALCIUM SERPL-MCNC: 8.7 MG/DL (ref 8.5–10.5)
CHLORIDE SERPL-SCNC: 106 MMOL/L (ref 95–110)
CHLORIDE SERPL-SCNC: 107 MMOL/L (ref 95–110)
CO2 SERPL-SCNC: 23 MMOL/L (ref 22–32)
CO2 SERPL-SCNC: 24 MMOL/L (ref 22–32)
CREAT SERPL-MCNC: 0.82 MG/DL (ref 0.5–1.5)
CREAT SERPL-MCNC: 0.87 MG/DL (ref 0.5–1.5)
EOSINOPHIL # BLD: 0.1 K/UL (ref 0–0.7)
EOSINOPHIL NFR BLD: 2 %
ERYTHROCYTE [DISTWIDTH] IN BLOOD BY AUTOMATED COUNT: 14.4 % (ref 11–15)
GLOBULIN PLAS-MCNC: 2.3 G/DL (ref 2.5–3.7)
GLUCOSE SERPL-MCNC: 111 MG/DL (ref 70–99)
GLUCOSE SERPL-MCNC: 115 MG/DL (ref 70–99)
HCT VFR BLD AUTO: 21.9 % (ref 35–48)
HCT VFR BLD AUTO: 26.5 % (ref 35–48)
HGB BLD-MCNC: 7.2 G/DL (ref 12–16)
HGB BLD-MCNC: 9 G/DL (ref 12–16)
INR BLD: 1 (ref 0.9–1.2)
LYMPHOCYTES # BLD: 1 K/UL (ref 1–4)
LYMPHOCYTES NFR BLD: 22 %
MAGNESIUM SERPL-MCNC: 1.8 MG/DL (ref 1.8–2.5)
MCH RBC QN AUTO: 31.5 PG (ref 27–32)
MCHC RBC AUTO-ENTMCNC: 32.7 G/DL (ref 32–37)
MCV RBC AUTO: 96.3 FL (ref 80–100)
MONOCYTES # BLD: 0.5 K/UL (ref 0–1)
MONOCYTES NFR BLD: 10 %
NEUTROPHILS # BLD AUTO: 3.2 K/UL (ref 1.8–7.7)
NEUTROPHILS NFR BLD: 66 %
OSMOLALITY UR CALC.SUM OF ELEC: 284 MOSM/KG (ref 275–295)
OSMOLALITY UR CALC.SUM OF ELEC: 287 MOSM/KG (ref 275–295)
PLATELET # BLD AUTO: 203 K/UL (ref 140–400)
PMV BLD AUTO: 8.1 FL (ref 7.4–10.3)
POTASSIUM SERPL-SCNC: 3.5 MMOL/L (ref 3.3–5.1)
POTASSIUM SERPL-SCNC: 3.6 MMOL/L (ref 3.3–5.1)
POTASSIUM SERPL-SCNC: 4.1 MMOL/L (ref 3.3–5.1)
PROT SERPL-MCNC: 5.8 G/DL (ref 5.9–8.4)
PROTHROMBIN TIME: 12.7 SECONDS (ref 11.8–14.5)
RBC # BLD AUTO: 2.28 M/UL (ref 3.7–5.4)
SODIUM SERPL-SCNC: 136 MMOL/L (ref 136–144)
SODIUM SERPL-SCNC: 137 MMOL/L (ref 136–144)
WBC # BLD AUTO: 4.8 K/UL (ref 4–11)

## 2018-09-19 PROCEDURE — 99232 SBSQ HOSP IP/OBS MODERATE 35: CPT | Performed by: INTERNAL MEDICINE

## 2018-09-19 PROCEDURE — 99233 SBSQ HOSP IP/OBS HIGH 50: CPT | Performed by: HOSPITALIST

## 2018-09-19 PROCEDURE — 30233N1 TRANSFUSION OF NONAUTOLOGOUS RED BLOOD CELLS INTO PERIPHERAL VEIN, PERCUTANEOUS APPROACH: ICD-10-PCS | Performed by: HOSPITALIST

## 2018-09-19 RX ORDER — LORAZEPAM 2 MG/ML
4 INJECTION INTRAMUSCULAR EVERY 10 MIN PRN
Status: DISCONTINUED | OUTPATIENT
Start: 2018-09-19 | End: 2018-09-20

## 2018-09-19 RX ORDER — LORAZEPAM 2 MG/ML
2 INJECTION INTRAMUSCULAR
Status: DISCONTINUED | OUTPATIENT
Start: 2018-09-19 | End: 2018-09-20

## 2018-09-19 RX ORDER — LORAZEPAM 2 MG/ML
1 INJECTION INTRAMUSCULAR EVERY 30 MIN PRN
Status: DISCONTINUED | OUTPATIENT
Start: 2018-09-19 | End: 2018-09-20

## 2018-09-19 RX ORDER — SODIUM CHLORIDE 9 MG/ML
INJECTION, SOLUTION INTRAVENOUS
Status: COMPLETED
Start: 2018-09-19 | End: 2018-09-19

## 2018-09-19 RX ORDER — LORAZEPAM 2 MG/ML
3 INJECTION INTRAMUSCULAR
Status: DISCONTINUED | OUTPATIENT
Start: 2018-09-19 | End: 2018-09-20

## 2018-09-19 RX ORDER — PANTOPRAZOLE SODIUM 40 MG/1
40 TABLET, DELAYED RELEASE ORAL EVERY 12 HOURS
Status: DISCONTINUED | OUTPATIENT
Start: 2018-09-19 | End: 2018-09-20

## 2018-09-19 RX ORDER — POTASSIUM CHLORIDE 20 MEQ/1
40 TABLET, EXTENDED RELEASE ORAL EVERY 4 HOURS
Status: COMPLETED | OUTPATIENT
Start: 2018-09-19 | End: 2018-09-19

## 2018-09-19 RX ORDER — SODIUM CHLORIDE 0.9 % (FLUSH) 0.9 %
10 SYRINGE (ML) INJECTION AS NEEDED
Status: DISCONTINUED | OUTPATIENT
Start: 2018-09-19 | End: 2018-09-20

## 2018-09-19 RX ORDER — SODIUM CHLORIDE 9 MG/ML
INJECTION, SOLUTION INTRAVENOUS ONCE
Status: COMPLETED | OUTPATIENT
Start: 2018-09-19 | End: 2018-09-19

## 2018-09-19 NOTE — PROGRESS NOTES
[facility]     Gastroenterology Progress Note    Anjelica Monet Patient Status:  Inpatient    1960 MRN [id number]   Location [facility] 2W/SW Attending Asad Delaney MD   Hosp Day # 2 PCP Adrianna Hernandez MD       Assess tightness, shortness of breath, wheezing and stridor. Cardiovascular: Negative for chest pain, palpitations and leg swelling.    Gastrointestinal: Negative for heartburn, nausea, vomiting, abdominal pain, diarrhea, constipation, blood in stool, abdominal Nursing note and vitals reviewed. Constitutional: She is oriented to person, place, and time. She appears well-developed and well-nourished. No distress. HENT:   Head: Normocephalic and atraumatic.    Mouth/Throat: Oropharynx is clear and moist. No or RDW  14.6   --   14.5   --   14.4   WBC  15.0*   --   5.2   --   4.8   PLT  282   --   193   --   203    < > = values in this interval not displayed.          Recent Labs   Lab  09/18/18   0435  09/19/18   0519  09/19/18   0838   GLU  95  111*  115*   BUN

## 2018-09-19 NOTE — PLAN OF CARE
Problem: PAIN - ADULT  Goal: Verbalizes/displays adequate comfort level or patient's stated pain goal  INTERVENTIONS:  - Encourage pt to monitor pain and request assistance  - Assess pain using appropriate pain scale  - Administer analgesics based on type Advance diet as tolerated, if ordered  - Obtain nutritional consult as needed  - Evaluate fluid balance  Outcome: Progressing    Goal: Maintains or returns to baseline bowel function  INTERVENTIONS:  - Assess bowel function  - Maintain adequate hydration w

## 2018-09-19 NOTE — PLAN OF CARE
Problem: Patient/Family Goals  Goal: Patient/Family Long Term Goal  Patient's Long Term Goal: \"I want to get outta here\"    Interventions:  - See additional Care Plan goals for specific interventions  Outcome: Not Progressing    Goal: Patient/Family Sean Draper ordered  - Implement neutropenic guidelines  Outcome: Progressing  Afebrile    Problem: SAFETY ADULT - FALL  Goal: Free from fall injury  INTERVENTIONS:  - Assess pt frequently for physical needs  - Identify cognitive and physical deficits and behaviors th Evaluate effectiveness of ordered antiemetic medications  - Provide nonpharmacologic comfort measures as appropriate  - Advance diet as tolerated, if ordered  - Obtain nutritional consult as needed  - Evaluate fluid balance  Outcome: Progressing  none  [facility] Patient's clothes are in the nurse . VSS. Tele box back on. Will continue to monitor.

## 2018-09-19 NOTE — PROGRESS NOTES
[facility]    Progress Note    Alberto Cavazos Patient Status:  Inpatient    1960 MRN [id number]   Location [facility] 2W/SW Attending Maria M Bolivar MD   [facility] Day # 2 PCP Javier Hodge MD       Subjective:   Alberto Cavazos is a(n) Vicky Kramer MD on 9/17/2018 at 7:33     Approved by (CST): Dayana Vicente MD on 9/17/2018 at 7:39          [employer] ([id number])    Result Date: 9/17/2018  CONCLUSION:   No pneumoperitoneum or obstruction.      Dictated by (CST): Dayne Aschoff, MD

## 2018-09-20 VITALS
SYSTOLIC BLOOD PRESSURE: 130 MMHG | OXYGEN SATURATION: 100 % | DIASTOLIC BLOOD PRESSURE: 73 MMHG | WEIGHT: 174 LBS | BODY MASS INDEX: 24.36 KG/M2 | RESPIRATION RATE: 18 BRPM | HEART RATE: 85 BPM | TEMPERATURE: 98 F | HEIGHT: 71 IN

## 2018-09-20 LAB
ANION GAP SERPL CALC-SCNC: 8 MMOL/L (ref 0–18)
BASOPHILS # BLD: 0 K/UL (ref 0–0.2)
BASOPHILS NFR BLD: 0 %
BLOOD TYPE BARCODE: 5100
BUN SERPL-MCNC: 12 MG/DL (ref 8–20)
BUN/CREAT SERPL: 12.4 (ref 10–20)
CALCIUM SERPL-MCNC: 9.7 MG/DL (ref 8.5–10.5)
CHLORIDE SERPL-SCNC: 105 MMOL/L (ref 95–110)
CO2 SERPL-SCNC: 22 MMOL/L (ref 22–32)
CREAT SERPL-MCNC: 0.97 MG/DL (ref 0.5–1.5)
EOSINOPHIL # BLD: 0.1 K/UL (ref 0–0.7)
EOSINOPHIL NFR BLD: 1 %
ERYTHROCYTE [DISTWIDTH] IN BLOOD BY AUTOMATED COUNT: 16.2 % (ref 11–15)
GLUCOSE SERPL-MCNC: 115 MG/DL (ref 70–99)
HCT VFR BLD AUTO: 28.3 % (ref 35–48)
HGB BLD-MCNC: 9.5 G/DL (ref 12–16)
LYMPHOCYTES # BLD: 0.8 K/UL (ref 1–4)
LYMPHOCYTES NFR BLD: 13 %
MCH RBC QN AUTO: 31.6 PG (ref 27–32)
MCHC RBC AUTO-ENTMCNC: 33.7 G/DL (ref 32–37)
MCV RBC AUTO: 93.7 FL (ref 80–100)
MONOCYTES # BLD: 0.5 K/UL (ref 0–1)
MONOCYTES NFR BLD: 8 %
NEUTROPHILS # BLD AUTO: 4.9 K/UL (ref 1.8–7.7)
NEUTROPHILS NFR BLD: 77 %
OSMOLALITY UR CALC.SUM OF ELEC: 281 MOSM/KG (ref 275–295)
PLATELET # BLD AUTO: 266 K/UL (ref 140–400)
PMV BLD AUTO: 8.7 FL (ref 7.4–10.3)
POTASSIUM SERPL-SCNC: 4.7 MMOL/L (ref 3.3–5.1)
RBC # BLD AUTO: 3.02 M/UL (ref 3.7–5.4)
SODIUM SERPL-SCNC: 135 MMOL/L (ref 136–144)
WBC # BLD AUTO: 6.4 K/UL (ref 4–11)

## 2018-09-20 PROCEDURE — 99239 HOSP IP/OBS DSCHRG MGMT >30: CPT | Performed by: HOSPITALIST

## 2018-09-20 PROCEDURE — 99232 SBSQ HOSP IP/OBS MODERATE 35: CPT | Performed by: INTERNAL MEDICINE

## 2018-09-20 RX ORDER — PANTOPRAZOLE SODIUM 40 MG/1
TABLET, DELAYED RELEASE ORAL
Qty: 150 TABLET | Refills: 0 | Status: SHIPPED | OUTPATIENT
Start: 2018-09-20 | End: 2018-12-19

## 2018-09-20 RX ORDER — PANTOPRAZOLE SODIUM 40 MG/1
40 TABLET, DELAYED RELEASE ORAL EVERY 12 HOURS
Qty: 60 TABLET | Refills: 0 | Status: SHIPPED | OUTPATIENT
Start: 2018-09-20 | End: 2019-04-22

## 2018-09-20 NOTE — PROGRESS NOTES
[facility]     Gastroenterology Progress Note    Gerri Deannloly Patient Status:  Inpatient    1960 MRN [id number]   Location [facility] 5SW/SE Attending Zenobia Gleason MD   Hosp Day # 3 PCP MD Makayla Kilgore cough, choking, chest tightness, shortness of breath, wheezing and stridor. Cardiovascular: Negative for chest pain, palpitations and leg swelling.    Gastrointestinal: Negative for heartburn, nausea, vomiting, abdominal pain, diarrhea, constipation, blo Constitutional: She is oriented to person, place, and time. She appears well-developed and well-nourished. No distress. HENT:   Head: Normocephalic and atraumatic. Mouth/Throat: Oropharynx is clear and moist. No oropharyngeal exudate.    Eyes: Conjunc 4.8   --   6.4   PLT  193   --   203   --   266    < > = values in this interval not displayed.          Recent Labs   Lab  09/18/18   0435  09/19/18   0519  09/19/18   0838  09/19/18   1358   GLU  95  111*  115*   --    BUN  38*  18  16   --    CREATSERUM

## 2018-09-20 NOTE — DISCHARGE SUMMARY
[place] [facility] - [facility]    Discharge Summary    Antoinette Cheng Patient Status:  Inpatient    1960 MRN [id number]   Location [facility] 5SW/SE Attending Jan Cortez MD   [address] Day # 3 PCP Dima Patel MD     Date of Admission: 2018 Provider Role Specialty    Gina Maxwell MD Consulting Physician  GASTROENTEROLOGY        Surgical Procedures     Case IDs Date Procedure Surgeon Location Status    [id number] 9/17/18 ESOPHAGOGASTRODUODENOSCOPY (EGD) Elester Narrow, Sadie Aran, Landrum Merlin Cyanocobalamin 1000 MCG Caps      Take 1,000 mcg by mouth daily.    Quantity:  90 capsule  Refills:  4           Where to Get Your Medications      Please  your prescriptions at the location directed by your doctor or nurse    Bring a paper prescri

## 2018-09-20 NOTE — TELEPHONE ENCOUNTER
I pended order for pantoprazole to you Dr. Marquis Ramachandran.     Per Dr. Maryanne Diallo note from 9/20/18 pt is aware to take Pantoprazole 40 mg twice daily ×2 months then daily indefinitely,  For complex ulcer disease    Also Routed to GI Schedulers----GI schedulers- p

## 2018-09-20 NOTE — TELEPHONE ENCOUNTER
Patient is being discharged from the hospital today. GI RN, please send a prescription for pantoprazole 40 mg twice daily ×2 months, then daily. For some reason, I was not able to enter this today.       Please make sure patient gets an appointment for co

## 2018-09-20 NOTE — PLAN OF CARE
Problem: Patient/Family Goals  Goal: Patient/Family Long Term Goal  Patient's Long Term Goal: \"I want to get outta here\"    Interventions:  - See additional Care Plan goals for specific interventions   Outcome: Progressing    Goal: Patient/Family Short T neutropenic guidelines  Outcome: Progressing      Problem: SAFETY ADULT - FALL  Goal: Free from fall injury  INTERVENTIONS:  - Assess pt frequently for physical needs  - Identify cognitive and physical deficits and behaviors that affect risk of falls.   - I nutritional consult as needed  - Evaluate fluid balance  Outcome: Progressing    Goal: Maintains or returns to baseline bowel function  INTERVENTIONS:  - Assess bowel function  - Maintain adequate hydration with IV or PO as ordered and tolerated  - Evaluat

## 2018-09-20 NOTE — PLAN OF CARE
Problem: Patient/Family Goals  Goal: Patient/Family Long Term Goal  Patient's Long Term Goal: \"I want to get outta here\"    Interventions:  - See additional Care Plan goals for specific interventions   Outcome: Adequate for Discharge    Goal: Patient/Fam growth factors as ordered  - Implement neutropenic guidelines  Outcome: Adequate for Discharge      Problem: SAFETY ADULT - FALL  Goal: Free from fall injury  INTERVENTIONS:  - Assess pt frequently for physical needs  - Identify cognitive and physical defi measures as appropriate  - Advance diet as tolerated, if ordered  - Obtain nutritional consult as needed  - Evaluate fluid balance  Outcome: Adequate for Discharge    Goal: Maintains or returns to baseline bowel function  INTERVENTIONS:  - Assess bowel fun

## 2018-09-21 ENCOUNTER — TELEPHONE (OUTPATIENT)
Dept: FAMILY MEDICINE CLINIC | Facility: CLINIC | Age: 58
End: 2018-09-21

## 2018-09-21 NOTE — PAYOR COMM NOTE
--------------  DISCHARGE REVIEW    Payor: [address] #:  [id number]  Authorization Number: [id number]    Admit date: 9/17/18  Admit time:  0910  Discharge Date: 9/20/2018 10:31 AM     Admitting Physician: Pierre Delong Neurologic: No focal neurological deficits. Musculoskeletal: Moves all extremities.     Hospital Course:   Upper GI bleed - with melena  Acute blood loss anemia   - Hb stable   -h/o perforated duodenal ulcer s/p lap repair in 3/2018  -GI on consult  -EGD Refills:  0        CONTINUE taking these medications      Instructions Prescription details   AmLODIPine Besylate 10 MG Tabs  Commonly known as:  NORVASC      Take 1 tablet (10 mg total) by mouth daily.    Quantity:  90 tablet  Refills:  4     Levothyroxine Physical Exam:    General: No acute distress. Respiratory: Clear to auscultation bilaterally. No wheezes. No rhonchi. Cardiovascular: S1, S2. Regular rate and rhythm. No murmurs, rubs or gallops. Abdomen: Soft, nontender, nondistended.   Positive bowel -h/o perforated duodenal ulcer s/p lap repair in 3/2018  -GI on consult  -EGD showed actively bleeding duodenal bulb ulcer with visible vessel    - patient noncompliant and wants to go home- convinced her to stay one more day   - transfuse if less then 7   WBC 5.2 09/18/2018     HGB 7.5 (L) 09/18/2018     HCT 22.2 (L) 09/18/2018      09/18/2018     CREATSERUM 0.94 09/18/2018     BUN 38 (H) 09/18/2018      09/18/2018     K 4.2 09/18/2018      (H) 09/18/2018     CO2 25 09/18/2018     GLU 9 - transfuse if less then 7   -con't protonix IV  -monitor H/H  - Continue soft diet   - continue protonix drip     Hypertension - BP on low side, possibly due to blood loss, dehydration  -hold home BP meds  -monitor  -IVF     TIMMY 2/2 Dehydration, hypovolem

## 2018-09-24 ENCOUNTER — TELEPHONE (OUTPATIENT)
Dept: GASTROENTEROLOGY | Facility: CLINIC | Age: 58
End: 2018-09-24

## 2018-09-24 NOTE — TELEPHONE ENCOUNTER
Pt states she was told per [facility] to be seen in office mid October for hosp follow up . No appts available. Please call.

## 2018-09-24 NOTE — TELEPHONE ENCOUNTER
Spoke to the pt. We scheduled a hospital f/u. Date time and location verified with the pt.     Future Appointments   Date Time Provider Rose Marie Arce   10/31/2018  4:15 PM Cammie Arredondo, 1 [facility]

## 2018-10-01 ENCOUNTER — TELEPHONE (OUTPATIENT)
Dept: OTHER | Age: 58
End: 2018-10-01

## 2018-10-01 NOTE — TELEPHONE ENCOUNTER
Aranza with [facility] had 3 questions about the patient: her last appt with Dr Raman Sanders, her next appt with Dr Raman Sanders, her next appt with any provider.  Information provided to [facility], last OV with Dr Raman Sanders 8/27/18, no next appt with Dr Raman Sanders, next appt with

## 2018-10-31 ENCOUNTER — OFFICE VISIT (OUTPATIENT)
Dept: GASTROENTEROLOGY | Facility: CLINIC | Age: 58
End: 2018-10-31
Payer: COMMERCIAL

## 2018-10-31 VITALS
HEART RATE: 73 BPM | DIASTOLIC BLOOD PRESSURE: 80 MMHG | SYSTOLIC BLOOD PRESSURE: 143 MMHG | BODY MASS INDEX: 26.88 KG/M2 | HEIGHT: 71 IN | WEIGHT: 192 LBS

## 2018-10-31 DIAGNOSIS — K26.4 GASTROINTESTINAL HEMORRHAGE ASSOCIATED WITH DUODENAL ULCER: ICD-10-CM

## 2018-10-31 DIAGNOSIS — K26.9 DUODENAL ULCER: Primary | ICD-10-CM

## 2018-10-31 PROCEDURE — 99214 OFFICE O/P EST MOD 30 MIN: CPT | Performed by: INTERNAL MEDICINE

## 2018-10-31 RX ORDER — PANTOPRAZOLE SODIUM 40 MG/1
40 TABLET, DELAYED RELEASE ORAL
Qty: 90 TABLET | Refills: 3 | Status: SHIPPED | OUTPATIENT
Start: 2018-10-31 | End: 2019-03-18

## 2018-10-31 NOTE — PROGRESS NOTES
History of present illness: This is a 48-year-old female here for routine follow-up following hospitalization for GI bleeding.         Problem list including:    \"  Essential hypertension   Hypothyroidism   Osteoarthritis of left hip   Hyperlipidemia

## 2018-10-31 NOTE — PROGRESS NOTES
HPI:    Patient ID: Ana Carreon is a 60year old female. History of present illness: This is a 50-year-old female should have Dr. Ava Perez here for routine follow-up following hospitalization for GI bleeding.   Had presented to the hospital in September wi History of bleeding duodenal ulcer: History of complex ulcer disease including prior history of gastric ulcer, pyloroplasty, pyloric stenosis: Patient is compliant with medication and is asymptomatic currently. She is not using NSAIDs.   BC has not been ch total) by mouth daily. Disp: 90 tablet Rfl: 4   losartan 100 MG Oral Tab Take 1 tablet (100 mg total) by mouth daily.  Disp: 90 tablet Rfl: 4   Pantoprazole Sodium 40 MG Oral Tab EC Take 1 tablet (40 mg total) by mouth 2 (two) times daily for 60 days, THEN and time. Skin: Skin is warm and dry. No rash noted. She is not diaphoretic. No erythema. No pallor. Psychiatric: She has a normal mood and affect. Her behavior is normal. Judgment and thought content normal.   Nursing note and vitals reviewed.

## 2018-12-21 ENCOUNTER — TELEPHONE (OUTPATIENT)
Dept: GASTROENTEROLOGY | Facility: CLINIC | Age: 58
End: 2018-12-21

## 2019-01-29 ENCOUNTER — LAB ENCOUNTER (OUTPATIENT)
Dept: LAB | Age: 59
End: 2019-01-29
Attending: INTERNAL MEDICINE

## 2019-01-29 DIAGNOSIS — K26.9 DUODENAL ULCER: ICD-10-CM

## 2019-01-29 LAB
BASOPHILS # BLD AUTO: 0.04 X10(3) UL (ref 0–0.2)
BASOPHILS NFR BLD AUTO: 0.5 %
DEPRECATED RDW RBC AUTO: 57.7 FL (ref 35.1–46.3)
EOSINOPHIL # BLD AUTO: 0.07 X10(3) UL (ref 0–0.7)
EOSINOPHIL NFR BLD AUTO: 0.9 %
ERYTHROCYTE [DISTWIDTH] IN BLOOD BY AUTOMATED COUNT: 18.6 % (ref 11–15)
HCT VFR BLD AUTO: 32 % (ref 35–48)
HGB BLD-MCNC: 10 G/DL (ref 12–16)
IMM GRANULOCYTES # BLD AUTO: 0.07 X10(3) UL (ref 0–1)
IMM GRANULOCYTES NFR BLD: 0.9 %
LYMPHOCYTES # BLD AUTO: 1.5 X10(3) UL (ref 1–4)
LYMPHOCYTES NFR BLD AUTO: 19.2 %
MCH RBC QN AUTO: 26.5 PG (ref 26–34)
MCHC RBC AUTO-ENTMCNC: 31.3 G/DL (ref 31–37)
MCV RBC AUTO: 84.9 FL (ref 80–100)
MONOCYTES # BLD AUTO: 0.88 X10(3) UL (ref 0.1–1)
MONOCYTES NFR BLD AUTO: 11.2 %
NEUTROPHILS # BLD AUTO: 5.27 X10 (3) UL (ref 1.5–7.7)
NEUTROPHILS # BLD AUTO: 5.27 X10(3) UL (ref 1.5–7.7)
NEUTROPHILS NFR BLD AUTO: 67.3 %
PLATELET # BLD AUTO: 409 10(3)UL (ref 150–450)
RBC # BLD AUTO: 3.77 X10(6)UL (ref 3.8–5.3)
WBC # BLD AUTO: 7.8 X10(3) UL (ref 4–11)

## 2019-01-29 PROCEDURE — 36415 COLL VENOUS BLD VENIPUNCTURE: CPT

## 2019-01-29 PROCEDURE — 85025 COMPLETE CBC W/AUTO DIFF WBC: CPT

## 2019-02-04 ENCOUNTER — TELEPHONE (OUTPATIENT)
Dept: GASTROENTEROLOGY | Facility: CLINIC | Age: 59
End: 2019-02-04

## 2019-03-21 RX ORDER — PANTOPRAZOLE SODIUM 40 MG/1
40 TABLET, DELAYED RELEASE ORAL
Qty: 90 TABLET | Refills: 0 | Status: SHIPPED | OUTPATIENT
Start: 2019-03-21 | End: 2019-04-10

## 2019-03-21 NOTE — TELEPHONE ENCOUNTER
Requesting refills on Pantoprazole Sodium 40 mg oral tablet EC  Sig:Take 1 tablet ( 40 mg total ) by mouth every morning before breakfast.  LR: 09/20/18  By:       Qty:150          Refills:0  LOV:10/31/18      Requested Medications      Pantoprazole

## 2019-03-27 NOTE — TELEPHONE ENCOUNTER
Per OV notes 10/31/18    Instructions     1. Continue pantoprazole daily before breakfast  2. Avoid NSAIDS  3.   Get CBC

## 2019-04-10 RX ORDER — PANTOPRAZOLE SODIUM 40 MG/1
40 TABLET, DELAYED RELEASE ORAL
Qty: 90 TABLET | Refills: 0 | Status: SHIPPED | OUTPATIENT
Start: 2019-04-10 | End: 2019-04-22

## 2019-07-04 RX ORDER — LOSARTAN POTASSIUM 100 MG/1
100 TABLET ORAL DAILY
Qty: 90 TABLET | Refills: 0 | Status: SHIPPED | OUTPATIENT
Start: 2019-07-04 | End: 2019-07-22

## 2019-07-04 RX ORDER — AMLODIPINE BESYLATE 10 MG/1
10 TABLET ORAL DAILY
Qty: 90 TABLET | Refills: 0 | Status: SHIPPED | OUTPATIENT
Start: 2019-07-04 | End: 2019-07-22

## 2019-07-04 RX ORDER — LEVOTHYROXINE SODIUM 0.1 MG/1
100 TABLET ORAL DAILY
Qty: 90 TABLET | Refills: 1 | Status: SHIPPED | OUTPATIENT
Start: 2019-07-04 | End: 2019-07-22

## 2019-07-04 NOTE — TELEPHONE ENCOUNTER
Refill passed per [facility], [facility] protocol.   Hypothyroid Medications  Protocol Criteria:  Appointment scheduled in the past 12 months or the next 3 months  TSH resulted in the past 12 months that is normal  Recent Outpatient Visits            8 months ago Duodenal ulcer    Dominick Anthony Vicci Solid, MD    Office Visit    10 months ago Hyperlipidemia, unspecified hyperlipidemia type    Sirena Mtz MD    Office Visit    1 year ago Alcohol abuse    Sirena Mtz MD    Office Visit    1 year ago Acute gastric ulcer with perforation [facility] Katia Gonzalez MD    Office Visit    1 year ago Essential hypertension    Nate Mtz, Rayna Mallory MD    Office Visit          Lab Results   Component Value Date    TSH 3.33 08/27/2018    THYROIDFUNC 6.00 (H) 08/25/2015

## 2019-07-09 ENCOUNTER — PATIENT MESSAGE (OUTPATIENT)
Dept: FAMILY MEDICINE CLINIC | Facility: CLINIC | Age: 59
End: 2019-07-09

## 2019-07-09 NOTE — TELEPHONE ENCOUNTER
From: Mick Finch  To: Mars Daniels MD  Sent: 7/9/2019 2:58 PM CDT  Subject: Other    Please forward this message to Dr. Raquel Clemente  Her name is not on the drop down menu.      Dr. Ollie West refill my prescription for :Pantoprazole Sod

## 2019-07-10 ENCOUNTER — PATIENT MESSAGE (OUTPATIENT)
Dept: FAMILY MEDICINE CLINIC | Facility: CLINIC | Age: 59
End: 2019-07-10

## 2019-07-10 RX ORDER — PANTOPRAZOLE SODIUM 40 MG/1
40 TABLET, DELAYED RELEASE ORAL
Qty: 30 TABLET | Refills: 3 | Status: SHIPPED | OUTPATIENT
Start: 2019-07-10 | End: 2019-09-04

## 2019-07-10 NOTE — PROGRESS NOTES
Patient last seen 9/2018  Please let patient know Dr. Donnell Johnson retired  She can see next available GI MD in the next 2 months  I will refill pantoprazole x2 months until she follows up.  Being sent to pharmacy in [place] per patient request.

## 2019-07-10 NOTE — TELEPHONE ENCOUNTER
From: Lynette Salazar  To: Shaylee Hameed MD  Sent: 7/10/2019 4:06 PM CDT  Subject: Non-Urgent Medical Question    Please forward this message to the office of Dr. Timmy Walton MD. Her name is not in the drop down menu.     Please fill my prescription f

## 2019-07-15 NOTE — TELEPHONE ENCOUNTER
Adriel Tolbert MD 5 days ago         Patient last seen 9/2018  Please let patient know Dr. Jessica Wang retired  She can see next available GI MD in the next 2 months  I will refill pantoprazole x2 months until she follows up.  Being sent to pharmacy in [place] per

## 2019-07-15 NOTE — TELEPHONE ENCOUNTER
Dr. Lisa Melendez please see patient email. Would you like to see her prior to refilling pantoprazole?

## 2019-07-23 RX ORDER — LOSARTAN POTASSIUM 100 MG/1
100 TABLET ORAL DAILY
Qty: 90 TABLET | Refills: 0 | Status: SHIPPED | OUTPATIENT
Start: 2019-07-23 | End: 2019-09-04

## 2019-07-23 RX ORDER — LEVOTHYROXINE SODIUM 0.1 MG/1
100 TABLET ORAL DAILY
Qty: 90 TABLET | Refills: 0 | Status: SHIPPED | OUTPATIENT
Start: 2019-07-23 | End: 2019-09-04

## 2019-07-23 RX ORDER — AMLODIPINE BESYLATE 10 MG/1
10 TABLET ORAL DAILY
Qty: 90 TABLET | Refills: 0 | Status: SHIPPED | OUTPATIENT
Start: 2019-07-23 | End: 2019-09-04

## 2019-07-23 NOTE — TELEPHONE ENCOUNTER
Pt called back and she stated that she is using [facility]. That she is no longer using [facility].  Medication have been sent  to [facility]

## 2019-08-22 ENCOUNTER — LAB ENCOUNTER (OUTPATIENT)
Dept: LAB | Age: 59
End: 2019-08-22
Attending: FAMILY MEDICINE
Payer: COMMERCIAL

## 2019-08-22 ENCOUNTER — OFFICE VISIT (OUTPATIENT)
Dept: FAMILY MEDICINE CLINIC | Facility: CLINIC | Age: 59
End: 2019-08-22
Payer: COMMERCIAL

## 2019-08-22 VITALS
BODY MASS INDEX: 26.99 KG/M2 | HEART RATE: 88 BPM | HEIGHT: 71 IN | DIASTOLIC BLOOD PRESSURE: 84 MMHG | WEIGHT: 192.81 LBS | SYSTOLIC BLOOD PRESSURE: 137 MMHG

## 2019-08-22 DIAGNOSIS — Z12.31 VISIT FOR SCREENING MAMMOGRAM: ICD-10-CM

## 2019-08-22 DIAGNOSIS — E78.5 HYPERLIPIDEMIA, UNSPECIFIED HYPERLIPIDEMIA TYPE: ICD-10-CM

## 2019-08-22 DIAGNOSIS — I10 ESSENTIAL HYPERTENSION: ICD-10-CM

## 2019-08-22 DIAGNOSIS — F10.10 ALCOHOL ABUSE: ICD-10-CM

## 2019-08-22 DIAGNOSIS — E03.9 HYPOTHYROIDISM, UNSPECIFIED TYPE: Primary | ICD-10-CM

## 2019-08-22 DIAGNOSIS — Z00.00 ROUTINE CHECK-UP: ICD-10-CM

## 2019-08-22 LAB
ALBUMIN SERPL-MCNC: 4.7 G/DL (ref 3.4–5)
ALBUMIN/GLOB SERPL: 1.3 {RATIO} (ref 1–2)
ALP LIVER SERPL-CCNC: 71 U/L (ref 46–118)
ALT SERPL-CCNC: 40 U/L (ref 13–56)
ANION GAP SERPL CALC-SCNC: 10 MMOL/L (ref 0–18)
AST SERPL-CCNC: 36 U/L (ref 15–37)
BASOPHILS # BLD AUTO: 0.06 X10(3) UL (ref 0–0.2)
BASOPHILS NFR BLD AUTO: 1.1 %
BILIRUB SERPL-MCNC: 0.9 MG/DL (ref 0.1–2)
BUN BLD-MCNC: 31 MG/DL (ref 7–18)
BUN/CREAT SERPL: 24.2 (ref 10–20)
CALCIUM BLD-MCNC: 9.8 MG/DL (ref 8.5–10.1)
CHLORIDE SERPL-SCNC: 106 MMOL/L (ref 98–112)
CHOLEST SMN-MCNC: 233 MG/DL (ref ?–200)
CO2 SERPL-SCNC: 25 MMOL/L (ref 21–32)
CREAT BLD-MCNC: 1.28 MG/DL (ref 0.55–1.02)
DEPRECATED RDW RBC AUTO: 50.4 FL (ref 35.1–46.3)
EOSINOPHIL # BLD AUTO: 0.11 X10(3) UL (ref 0–0.7)
EOSINOPHIL NFR BLD AUTO: 2.1 %
ERYTHROCYTE [DISTWIDTH] IN BLOOD BY AUTOMATED COUNT: 14.3 % (ref 11–15)
GLOBULIN PLAS-MCNC: 3.6 G/DL (ref 2.8–4.4)
GLUCOSE BLD-MCNC: 101 MG/DL (ref 70–99)
HCT VFR BLD AUTO: 38.3 % (ref 35–48)
HDLC SERPL-MCNC: 91 MG/DL (ref 40–59)
HGB BLD-MCNC: 12.6 G/DL (ref 12–16)
IMM GRANULOCYTES # BLD AUTO: 0.02 X10(3) UL (ref 0–1)
IMM GRANULOCYTES NFR BLD: 0.4 %
LDLC SERPL CALC-MCNC: 126 MG/DL (ref ?–100)
LYMPHOCYTES # BLD AUTO: 1.11 X10(3) UL (ref 1–4)
LYMPHOCYTES NFR BLD AUTO: 21.3 %
M PROTEIN MFR SERPL ELPH: 8.3 G/DL (ref 6.4–8.2)
MCH RBC QN AUTO: 31.3 PG (ref 26–34)
MCHC RBC AUTO-ENTMCNC: 32.9 G/DL (ref 31–37)
MCV RBC AUTO: 95 FL (ref 80–100)
MONOCYTES # BLD AUTO: 0.71 X10(3) UL (ref 0.1–1)
MONOCYTES NFR BLD AUTO: 13.6 %
NEUTROPHILS # BLD AUTO: 3.21 X10 (3) UL (ref 1.5–7.7)
NEUTROPHILS # BLD AUTO: 3.21 X10(3) UL (ref 1.5–7.7)
NEUTROPHILS NFR BLD AUTO: 61.5 %
NONHDLC SERPL-MCNC: 142 MG/DL (ref ?–130)
OSMOLALITY SERPL CALC.SUM OF ELEC: 299 MOSM/KG (ref 275–295)
PATIENT FASTING: YES
PATIENT FASTING: YES
PLATELET # BLD AUTO: 355 10(3)UL (ref 150–450)
POTASSIUM SERPL-SCNC: 4.5 MMOL/L (ref 3.5–5.1)
RBC # BLD AUTO: 4.03 X10(6)UL (ref 3.8–5.3)
SODIUM SERPL-SCNC: 141 MMOL/L (ref 136–145)
TRIGL SERPL-MCNC: 79 MG/DL (ref 30–149)
TSI SER-ACNC: 0.98 MIU/ML (ref 0.36–3.74)
VIT B12 SERPL-MCNC: 631 PG/ML (ref 193–986)
VLDLC SERPL CALC-MCNC: 16 MG/DL (ref 0–30)
WBC # BLD AUTO: 5.2 X10(3) UL (ref 4–11)

## 2019-08-22 PROCEDURE — 80061 LIPID PANEL: CPT

## 2019-08-22 PROCEDURE — 99396 PREV VISIT EST AGE 40-64: CPT | Performed by: FAMILY MEDICINE

## 2019-08-22 PROCEDURE — 84443 ASSAY THYROID STIM HORMONE: CPT

## 2019-08-22 PROCEDURE — 36415 COLL VENOUS BLD VENIPUNCTURE: CPT

## 2019-08-22 PROCEDURE — 82607 VITAMIN B-12: CPT

## 2019-08-22 PROCEDURE — 80053 COMPREHEN METABOLIC PANEL: CPT

## 2019-08-22 PROCEDURE — 85025 COMPLETE CBC W/AUTO DIFF WBC: CPT

## 2019-08-22 PROCEDURE — 82306 VITAMIN D 25 HYDROXY: CPT

## 2019-08-22 NOTE — PROGRESS NOTES
HPI:   Eugenio Pritchett is a 60year old female who presents for a complete physical exam.    Still walking 6 miles every day and still drinking wine daily - per patient a few glasses. Reports no problems or concerns. Needs forms for work filled out.      Wt Re [facility] MAIN OR   • OTHER Right 09/28/15    ORIF patella   • TOTAL HIP REPLACEMENT     • WRIST FRACTURE SURGERY Left 2012      Family History   Problem Relation Age of Onset   • Arthritis Mother    • Heart Disease Father         Valve repair      Social Histor Brain Blind is a 60year old female who presents for a complete physical exam.    1. Hypothyroidism, unspecified type  Due for labs     2. Essential hypertension  BP stable    3. Routine check-up    - CBC WITH DIFFERENTIAL WITH PLATELET;  Future  - COMP ME

## 2019-08-23 LAB — 25(OH)D3 SERPL-MCNC: 47.5 NG/ML (ref 30–100)

## 2019-08-29 ENCOUNTER — TELEPHONE (OUTPATIENT)
Dept: FAMILY MEDICINE CLINIC | Facility: CLINIC | Age: 59
End: 2019-08-29

## 2019-08-29 NOTE — TELEPHONE ENCOUNTER
Dr. Raman aSnders received and completed Health Provider Screening form. Form faxed to [phone]  confirmation received.

## 2019-08-29 NOTE — TELEPHONE ENCOUNTER
Patient was seen in office on 08/22/2019 and Dropped off health provider screening form. Per patient this has not been returned to employer and was due by 08/31/2019.      Please complete and fax to [phone] or can be uploaded to [email or web address]

## 2019-08-29 NOTE — TELEPHONE ENCOUNTER
Dr. Jonah Cid received and completed Health Provider Screening form. Form faxed to [phone]  confirmation received.

## 2019-09-04 NOTE — TELEPHONE ENCOUNTER
Patient called back and message from Dr. Finnegan Marrow given. Patient states she will call back to make f/u appointment. Dr. Jevon Apodaca sign off on refill. Pharmacy verified.  Thank you

## 2019-09-04 NOTE — TELEPHONE ENCOUNTER
Pantoprazole Sodium 40 mg, Dr. Lloyd Soto, please review and sign off if appropriate. Thank you. LOV: 12/21/18 with Dr. Iris Mcleod  Rx sent to office on-call.

## 2019-09-04 NOTE — TELEPHONE ENCOUNTER
Patient previously had medication filled by Dr. Smiley Telles and myself.      My last note:  \"Patient last seen 9/2018  Please let patient know Dr. Marquis Ramachandran retired  She can see next available GI MD in the next 2 months  I will refill pantoprazole x2 months until she

## 2019-09-05 RX ORDER — LOSARTAN POTASSIUM 100 MG/1
100 TABLET ORAL DAILY
Qty: 90 TABLET | Refills: 1 | Status: SHIPPED | OUTPATIENT
Start: 2019-09-05 | End: 2020-04-11

## 2019-09-05 RX ORDER — AMLODIPINE BESYLATE 10 MG/1
10 TABLET ORAL DAILY
Qty: 90 TABLET | Refills: 1 | Status: SHIPPED | OUTPATIENT
Start: 2019-09-05 | End: 2020-01-22

## 2019-09-05 RX ORDER — LEVOTHYROXINE SODIUM 0.1 MG/1
100 TABLET ORAL DAILY
Qty: 90 TABLET | Refills: 1 | Status: SHIPPED | OUTPATIENT
Start: 2019-09-05 | End: 2020-01-22

## 2019-09-05 RX ORDER — PANTOPRAZOLE SODIUM 40 MG/1
40 TABLET, DELAYED RELEASE ORAL
Qty: 30 TABLET | Refills: 3 | Status: SHIPPED | OUTPATIENT
Start: 2019-09-05 | End: 2019-10-24

## 2019-09-05 NOTE — TELEPHONE ENCOUNTER
Refill passed per [facility] [place] protocol.     Hypertensive Medications  Protocol Criteria:  · Appointment scheduled in the past 6 months or in the next 3 months  · BMP or CMP in the past 12 months  · Creatinine result < 2  Recent Outpatient Visits hyperlipidemia type    Lauryn Nieto MD    Office Visit    1 year ago Alcohol abuse    Megan Mcguire [place] MD Cassia    Office Visit    1 year ago Acute gastric ulcer with perforation (

## 2019-10-25 RX ORDER — PANTOPRAZOLE SODIUM 40 MG/1
40 TABLET, DELAYED RELEASE ORAL
Qty: 30 TABLET | Refills: 3 | Status: SHIPPED | OUTPATIENT
Start: 2019-10-25 | End: 2019-11-08

## 2019-10-25 NOTE — TELEPHONE ENCOUNTER
Pantoprazole Sodium 40 MG Oral Tab EC              Sig: Take 1 tablet (40 mg total) by mouth every morning before breakfast.    Disp:  30 tablet    Refills:  3    Start: 10/24/2019 - 2/21/2020     Lr:9/5/19         Qty:30      Refills:3  Pt has not made a

## 2019-11-08 ENCOUNTER — OFFICE VISIT (OUTPATIENT)
Dept: INTERNAL MEDICINE CLINIC | Facility: CLINIC | Age: 59
End: 2019-11-08
Payer: COMMERCIAL

## 2019-11-08 VITALS
TEMPERATURE: 98 F | HEART RATE: 86 BPM | DIASTOLIC BLOOD PRESSURE: 79 MMHG | SYSTOLIC BLOOD PRESSURE: 129 MMHG | WEIGHT: 195 LBS | BODY MASS INDEX: 27 KG/M2

## 2019-11-08 DIAGNOSIS — K26.9 DUODENAL ULCER: Primary | ICD-10-CM

## 2019-11-08 PROCEDURE — 99213 OFFICE O/P EST LOW 20 MIN: CPT | Performed by: INTERNAL MEDICINE

## 2019-11-08 RX ORDER — PANTOPRAZOLE SODIUM 40 MG/1
40 TABLET, DELAYED RELEASE ORAL
Qty: 90 TABLET | Refills: 0 | Status: SHIPPED | OUTPATIENT
Start: 2019-11-08 | End: 2020-03-07

## 2019-11-08 NOTE — PROGRESS NOTES
History of Present Illness   Patient ID: Lin Rod is a 60year old female. Chief Complaint: Ulcer (needs refill on Pantoprazole 40mg. )      Ulcer   Chronicity: EGD 9/2018: Actively bleeding duodenal bulb ulcer with visible vessel.  Surgical changes antr rigidity, no rebound and no guarding. Neurological: She is alert and oriented to person, place, and time. Skin: Skin is warm and dry. Psychiatric: She has a normal mood and affect. Labs & Imaging  Pertinent labs and imaging reviewed.    Lab Re (acute kidney injury) ([address]      Shock ([address]      Low bicarbonate      Perforated bowel ([address]      Alcohol abuse      Duodenal ulcer      Nausea and vomiting      Hyperlipidemia      Essential hypertension      Hypothyroidism      Osteoarthritis of left hi Disp: 90 tablet, Rfl: 1  •  Levothyroxine Sodium 100 MCG Oral Tab, Take 1 tablet (100 mcg total) by mouth daily. , Disp: 90 tablet, Rfl: 1  •  losartan 100 MG Oral Tab, Take 1 tablet (100 mg total) by mouth daily. , Disp: 90 tablet, Rfl: 1  •  Magnesium 400 follow directions and advice. Call office with any questions. Seek emergency care if necessary.          ----------------------------------------- END NOTE-----------------------------------------     There are no Patient Instructions on file for this visi

## 2020-01-23 RX ORDER — LEVOTHYROXINE SODIUM 0.1 MG/1
100 TABLET ORAL DAILY
Qty: 90 TABLET | Refills: 1 | Status: SHIPPED | OUTPATIENT
Start: 2020-01-23 | End: 2020-04-11

## 2020-01-23 RX ORDER — AMLODIPINE BESYLATE 10 MG/1
10 TABLET ORAL DAILY
Qty: 90 TABLET | Refills: 1 | Status: SHIPPED | OUTPATIENT
Start: 2020-01-23 | End: 2020-04-11

## 2020-01-23 NOTE — TELEPHONE ENCOUNTER
Refill passed per [facility] protocol.   Hypothyroid Medications  Protocol Criteria:  Appointment scheduled in the past 12 months or the next 3 months  TSH resulted in the past 12 months that is normal  Recent Outpatient Visits            2 months ago

## 2020-04-11 RX ORDER — LEVOTHYROXINE SODIUM 0.1 MG/1
100 TABLET ORAL DAILY
Qty: 90 TABLET | Refills: 1 | Status: SHIPPED | OUTPATIENT
Start: 2020-04-11 | End: 2020-06-09

## 2020-04-11 RX ORDER — AMLODIPINE BESYLATE 10 MG/1
10 TABLET ORAL DAILY
Qty: 90 TABLET | Refills: 1 | Status: SHIPPED | OUTPATIENT
Start: 2020-04-11 | End: 2020-06-09

## 2020-04-11 RX ORDER — LOSARTAN POTASSIUM 100 MG/1
100 TABLET ORAL DAILY
Qty: 90 TABLET | Refills: 1 | Status: SHIPPED | OUTPATIENT
Start: 2020-04-11 | End: 2020-06-09

## 2020-06-10 RX ORDER — LEVOTHYROXINE SODIUM 0.1 MG/1
100 TABLET ORAL DAILY
Qty: 90 TABLET | Refills: 1 | Status: SHIPPED | OUTPATIENT
Start: 2020-06-10 | End: 2020-08-26

## 2020-06-10 RX ORDER — LOSARTAN POTASSIUM 100 MG/1
100 TABLET ORAL DAILY
Qty: 90 TABLET | Refills: 1 | Status: SHIPPED | OUTPATIENT
Start: 2020-06-10 | End: 2020-08-26

## 2020-06-10 RX ORDER — AMLODIPINE BESYLATE 10 MG/1
10 TABLET ORAL DAILY
Qty: 90 TABLET | Refills: 1 | Status: SHIPPED | OUTPATIENT
Start: 2020-06-10 | End: 2020-08-26

## 2020-08-26 ENCOUNTER — OFFICE VISIT (OUTPATIENT)
Dept: FAMILY MEDICINE CLINIC | Facility: CLINIC | Age: 60
End: 2020-08-26
Payer: COMMERCIAL

## 2020-08-26 ENCOUNTER — LAB ENCOUNTER (OUTPATIENT)
Dept: LAB | Age: 60
End: 2020-08-26
Attending: NURSE PRACTITIONER
Payer: COMMERCIAL

## 2020-08-26 VITALS
WEIGHT: 185 LBS | TEMPERATURE: 97 F | HEART RATE: 72 BPM | BODY MASS INDEX: 25.9 KG/M2 | SYSTOLIC BLOOD PRESSURE: 119 MMHG | DIASTOLIC BLOOD PRESSURE: 69 MMHG | HEIGHT: 71 IN

## 2020-08-26 DIAGNOSIS — E78.5 HYPERLIPIDEMIA, UNSPECIFIED HYPERLIPIDEMIA TYPE: ICD-10-CM

## 2020-08-26 DIAGNOSIS — Z00.00 WELL ADULT EXAM: ICD-10-CM

## 2020-08-26 DIAGNOSIS — I10 ESSENTIAL HYPERTENSION: Primary | ICD-10-CM

## 2020-08-26 DIAGNOSIS — E03.9 HYPOTHYROIDISM, UNSPECIFIED TYPE: ICD-10-CM

## 2020-08-26 LAB
25(OH)D3 SERPL-MCNC: 60.3 NG/ML (ref 30–100)
ALBUMIN SERPL-MCNC: 4.5 G/DL (ref 3.4–5)
ALBUMIN/GLOB SERPL: 1.2 {RATIO} (ref 1–2)
ALP LIVER SERPL-CCNC: 82 U/L (ref 50–130)
ALT SERPL-CCNC: 37 U/L (ref 13–56)
ANION GAP SERPL CALC-SCNC: 10 MMOL/L (ref 0–18)
AST SERPL-CCNC: 32 U/L (ref 15–37)
BASOPHILS # BLD AUTO: 0.06 X10(3) UL (ref 0–0.2)
BASOPHILS NFR BLD AUTO: 1 %
BILIRUB SERPL-MCNC: 0.7 MG/DL (ref 0.1–2)
BUN BLD-MCNC: 31 MG/DL (ref 7–18)
BUN/CREAT SERPL: 18.9 (ref 10–20)
CALCIUM BLD-MCNC: 9.6 MG/DL (ref 8.5–10.1)
CHLORIDE SERPL-SCNC: 104 MMOL/L (ref 98–112)
CHOLEST SMN-MCNC: 201 MG/DL (ref ?–200)
CO2 SERPL-SCNC: 21 MMOL/L (ref 21–32)
CREAT BLD-MCNC: 1.64 MG/DL (ref 0.55–1.02)
DEPRECATED RDW RBC AUTO: 49.6 FL (ref 35.1–46.3)
EOSINOPHIL # BLD AUTO: 0.13 X10(3) UL (ref 0–0.7)
EOSINOPHIL NFR BLD AUTO: 2.3 %
ERYTHROCYTE [DISTWIDTH] IN BLOOD BY AUTOMATED COUNT: 14.2 % (ref 11–15)
EST. AVERAGE GLUCOSE BLD GHB EST-MCNC: 111 MG/DL (ref 68–126)
GLOBULIN PLAS-MCNC: 3.8 G/DL (ref 2.8–4.4)
GLUCOSE BLD-MCNC: 87 MG/DL (ref 70–99)
HBA1C MFR BLD HPLC: 5.5 % (ref ?–5.7)
HCT VFR BLD AUTO: 35.6 % (ref 35–48)
HDLC SERPL-MCNC: 113 MG/DL (ref 40–59)
HGB BLD-MCNC: 11.5 G/DL (ref 12–16)
IMM GRANULOCYTES # BLD AUTO: 0.04 X10(3) UL (ref 0–1)
IMM GRANULOCYTES NFR BLD: 0.7 %
LDLC SERPL CALC-MCNC: 74 MG/DL (ref ?–100)
LYMPHOCYTES # BLD AUTO: 1.19 X10(3) UL (ref 1–4)
LYMPHOCYTES NFR BLD AUTO: 20.7 %
M PROTEIN MFR SERPL ELPH: 8.3 G/DL (ref 6.4–8.2)
MCH RBC QN AUTO: 31.1 PG (ref 26–34)
MCHC RBC AUTO-ENTMCNC: 32.3 G/DL (ref 31–37)
MCV RBC AUTO: 96.2 FL (ref 80–100)
MONOCYTES # BLD AUTO: 0.65 X10(3) UL (ref 0.1–1)
MONOCYTES NFR BLD AUTO: 11.3 %
NEUTROPHILS # BLD AUTO: 3.67 X10 (3) UL (ref 1.5–7.7)
NEUTROPHILS # BLD AUTO: 3.67 X10(3) UL (ref 1.5–7.7)
NEUTROPHILS NFR BLD AUTO: 64 %
NONHDLC SERPL-MCNC: 88 MG/DL (ref ?–130)
OSMOLALITY SERPL CALC.SUM OF ELEC: 286 MOSM/KG (ref 275–295)
PATIENT FASTING Y/N/NP: YES
PATIENT FASTING Y/N/NP: YES
PLATELET # BLD AUTO: 399 10(3)UL (ref 150–450)
POTASSIUM SERPL-SCNC: 5.6 MMOL/L (ref 3.5–5.1)
RBC # BLD AUTO: 3.7 X10(6)UL (ref 3.8–5.3)
SODIUM SERPL-SCNC: 135 MMOL/L (ref 136–145)
T3FREE SERPL-MCNC: 2.71 PG/ML (ref 2.4–4.2)
T4 FREE SERPL-MCNC: 1.2 NG/DL (ref 0.8–1.7)
TRIGL SERPL-MCNC: 69 MG/DL (ref 30–149)
TSI SER-ACNC: 0.29 MIU/ML (ref 0.36–3.74)
VIT B12 SERPL-MCNC: 1510 PG/ML (ref 193–986)
VLDLC SERPL CALC-MCNC: 14 MG/DL (ref 0–30)
WBC # BLD AUTO: 5.7 X10(3) UL (ref 4–11)

## 2020-08-26 PROCEDURE — 82607 VITAMIN B-12: CPT

## 2020-08-26 PROCEDURE — 3074F SYST BP LT 130 MM HG: CPT | Performed by: NURSE PRACTITIONER

## 2020-08-26 PROCEDURE — 84439 ASSAY OF FREE THYROXINE: CPT

## 2020-08-26 PROCEDURE — 85025 COMPLETE CBC W/AUTO DIFF WBC: CPT

## 2020-08-26 PROCEDURE — 36415 COLL VENOUS BLD VENIPUNCTURE: CPT

## 2020-08-26 PROCEDURE — 83036 HEMOGLOBIN GLYCOSYLATED A1C: CPT

## 2020-08-26 PROCEDURE — 3078F DIAST BP <80 MM HG: CPT | Performed by: NURSE PRACTITIONER

## 2020-08-26 PROCEDURE — 80061 LIPID PANEL: CPT

## 2020-08-26 PROCEDURE — 84443 ASSAY THYROID STIM HORMONE: CPT

## 2020-08-26 PROCEDURE — 84481 FREE ASSAY (FT-3): CPT

## 2020-08-26 PROCEDURE — 99396 PREV VISIT EST AGE 40-64: CPT | Performed by: NURSE PRACTITIONER

## 2020-08-26 PROCEDURE — 82306 VITAMIN D 25 HYDROXY: CPT

## 2020-08-26 PROCEDURE — 80053 COMPREHEN METABOLIC PANEL: CPT

## 2020-08-26 PROCEDURE — 3008F BODY MASS INDEX DOCD: CPT | Performed by: NURSE PRACTITIONER

## 2020-08-26 RX ORDER — AMLODIPINE BESYLATE 10 MG/1
10 TABLET ORAL DAILY
Qty: 90 TABLET | Refills: 1 | Status: SHIPPED | OUTPATIENT
Start: 2020-08-26 | End: 2020-08-30

## 2020-08-26 RX ORDER — LEVOTHYROXINE SODIUM 0.1 MG/1
100 TABLET ORAL DAILY
Qty: 90 TABLET | Refills: 1 | Status: SHIPPED | OUTPATIENT
Start: 2020-08-26 | End: 2020-08-30

## 2020-08-26 RX ORDER — AMLODIPINE BESYLATE 10 MG/1
10 TABLET ORAL DAILY
Qty: 90 TABLET | Refills: 1 | Status: CANCELLED | OUTPATIENT
Start: 2020-08-26

## 2020-08-26 RX ORDER — LOSARTAN POTASSIUM 100 MG/1
100 TABLET ORAL DAILY
Qty: 90 TABLET | Refills: 1 | Status: SHIPPED | OUTPATIENT
Start: 2020-08-26 | End: 2020-08-30

## 2020-08-26 NOTE — PROGRESS NOTES
HPI    Patient presents for annual physical.  Positive for past medical history; alcohol abuse, hypertension, hyperlipidemia. Negative for current complaints of health. Admits to still drinking alcohol daily; a few glasses.     Gyne history - G0  Last pap wrist 2012    per [employer]:  \"Management:  orif left wrist\"   • Disorder of thyroid    • Duodenal ulcer    • GI bleed    • High blood pressure    • High cholesterol    • Hypothyroidism    • Osteoarthritis of left hip 2010   • Unspecified essential hyperte member of club or organization: Not on file        Attends meetings of clubs or organizations: Not on file        Relationship status: Not on file      Intimate partner violence:        Fear of current or ex partner: Not on file        Emotionally abused: and normal heart sounds. No murmur heard. Pulmonary/Chest: Effort normal and breath sounds normal. No respiratory distress. She has no wheezes. Abdominal: Soft. Bowel sounds are normal. She exhibits no distension and no mass. There is no tenderness.

## 2020-08-27 ENCOUNTER — TELEPHONE (OUTPATIENT)
Dept: FAMILY MEDICINE CLINIC | Facility: CLINIC | Age: 60
End: 2020-08-27

## 2020-08-30 DIAGNOSIS — I10 ESSENTIAL HYPERTENSION: ICD-10-CM

## 2020-08-30 DIAGNOSIS — E03.9 HYPOTHYROIDISM, UNSPECIFIED TYPE: ICD-10-CM

## 2020-08-31 RX ORDER — LOSARTAN POTASSIUM 100 MG/1
100 TABLET ORAL DAILY
Qty: 90 TABLET | Refills: 1 | Status: SHIPPED | OUTPATIENT
Start: 2020-08-31 | End: 2020-12-05

## 2020-08-31 RX ORDER — AMLODIPINE BESYLATE 10 MG/1
10 TABLET ORAL DAILY
Qty: 90 TABLET | Refills: 1 | Status: SHIPPED | OUTPATIENT
Start: 2020-08-31 | End: 2020-12-05

## 2020-08-31 RX ORDER — LEVOTHYROXINE SODIUM 0.1 MG/1
100 TABLET ORAL DAILY
Qty: 90 TABLET | Refills: 1 | Status: SHIPPED | OUTPATIENT
Start: 2020-08-31 | End: 2020-12-05

## 2020-08-31 NOTE — TELEPHONE ENCOUNTER
Spoke with the patient and informed her of Hazel's message from below. Patient reports she believes her waist circumference is 38 inches. Patient also stated the form needs to go out today 8/31/20.  Message routed to provider and the clinic site staff for

## 2020-12-05 DIAGNOSIS — E03.9 HYPOTHYROIDISM, UNSPECIFIED TYPE: ICD-10-CM

## 2020-12-05 DIAGNOSIS — I10 ESSENTIAL HYPERTENSION: ICD-10-CM

## 2020-12-07 RX ORDER — LOSARTAN POTASSIUM 100 MG/1
100 TABLET ORAL DAILY
Qty: 90 TABLET | Refills: 1 | Status: SHIPPED | OUTPATIENT
Start: 2020-12-07 | End: 2021-02-09

## 2020-12-07 RX ORDER — LEVOTHYROXINE SODIUM 0.1 MG/1
100 TABLET ORAL DAILY
Qty: 90 TABLET | Refills: 1 | Status: SHIPPED | OUTPATIENT
Start: 2020-12-07 | End: 2021-02-09

## 2020-12-07 RX ORDER — AMLODIPINE BESYLATE 10 MG/1
10 TABLET ORAL DAILY
Qty: 90 TABLET | Refills: 1 | Status: SHIPPED | OUTPATIENT
Start: 2020-12-07 | End: 2021-02-09

## 2020-12-21 ENCOUNTER — HOSPITAL ENCOUNTER (OUTPATIENT)
Age: 60
Discharge: HOME | End: 2020-12-21
Attending: EMERGENCY MEDICINE
Payer: COMMERCIAL

## 2020-12-21 ENCOUNTER — APPOINTMENT (OUTPATIENT)
Dept: GENERAL RADIOLOGY | Age: 60
End: 2020-12-21
Attending: EMERGENCY MEDICINE
Payer: COMMERCIAL

## 2020-12-21 VITALS
RESPIRATION RATE: 17 BRPM | DIASTOLIC BLOOD PRESSURE: 71 MMHG | BODY MASS INDEX: 24 KG/M2 | SYSTOLIC BLOOD PRESSURE: 169 MMHG | HEART RATE: 98 BPM | WEIGHT: 170 LBS | OXYGEN SATURATION: 97 % | TEMPERATURE: 98 F

## 2020-12-21 DIAGNOSIS — S62.101A CLOSED FRACTURE OF RIGHT WRIST, INITIAL ENCOUNTER: ICD-10-CM

## 2020-12-21 DIAGNOSIS — S32.010A COMPRESSION FRACTURE OF L1 VERTEBRA, INITIAL ENCOUNTER (HCC): ICD-10-CM
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
## 2020-12-21 DIAGNOSIS — S30.0XXA LUMBAR CONTUSION, INITIAL ENCOUNTER: ICD-10-CM

## 2020-12-21 DIAGNOSIS — S00.03XA CONTUSION OF SCALP, INITIAL ENCOUNTER: ICD-10-CM

## 2020-12-21 DIAGNOSIS — T07.XXXA MULTIPLE ABRASIONS: ICD-10-CM

## 2020-12-21 PROCEDURE — A4565 SLINGS: HCPCS | Performed by: EMERGENCY MEDICINE

## 2020-12-21 PROCEDURE — 29075 APPL CST ELBW FNGR SHORT ARM: CPT | Performed by: EMERGENCY MEDICINE

## 2020-12-21 PROCEDURE — 73130 X-RAY EXAM OF HAND: CPT | Performed by: EMERGENCY MEDICINE

## 2020-12-21 PROCEDURE — 73110 X-RAY EXAM OF WRIST: CPT | Performed by: EMERGENCY MEDICINE

## 2020-12-21 PROCEDURE — 72100 X-RAY EXAM L-S SPINE 2/3 VWS: CPT | Performed by: EMERGENCY MEDICINE

## 2020-12-21 PROCEDURE — 99203 OFFICE O/P NEW LOW 30 MIN: CPT | Performed by: EMERGENCY MEDICINE

## 2020-12-21 NOTE — ED INITIAL ASSESSMENT (HPI)
Pt states tripped and fell 930am 2 days ago while going for a walk. States fell and hit forehead, c/o pain to R hand and low back. No LOC. No bowel or bladder incontinence. No urinary symptoms. No dizziness/lightheadedness. Denies a headache.   Pt with

## 2020-12-21 NOTE — ED PROVIDER NOTES
Patient Seen in: [facility]      History   Patient presents with:  Fall    Stated Complaint: fell, inj to forehead/rt wrist/low back    HPI    Patient is a 79-year-old female who presents to immediate care with injury to her right wrist.  She HENT: Negative for congestion, dental problem, facial swelling, hearing loss and nosebleeds. Eyes: Negative. Negative for photophobia, redness and visual disturbance. Respiratory: Negative for cough and shortness of breath.     Cardiovascular: Negativ Pharynx: Oropharynx is clear. No oropharyngeal exudate or posterior oropharyngeal erythema. Eyes:      Conjunctiva/sclera: Conjunctivae normal.      Pupils: Pupils are equal, round, and reactive to light.    Neck:      Musculoskeletal: Normal range of

## 2021-02-09 DIAGNOSIS — E03.9 HYPOTHYROIDISM, UNSPECIFIED TYPE: ICD-10-CM

## 2021-02-09 DIAGNOSIS — I10 ESSENTIAL HYPERTENSION: ICD-10-CM

## 2021-02-10 RX ORDER — LOSARTAN POTASSIUM 100 MG/1
100 TABLET ORAL DAILY
Qty: 90 TABLET | Refills: 1 | Status: SHIPPED | OUTPATIENT
Start: 2021-02-10 | End: 2021-04-29

## 2021-02-10 RX ORDER — LEVOTHYROXINE SODIUM 0.1 MG/1
100 TABLET ORAL DAILY
Qty: 90 TABLET | Refills: 1 | Status: SHIPPED | OUTPATIENT
Start: 2021-02-10 | End: 2021-04-29

## 2021-02-10 RX ORDER — AMLODIPINE BESYLATE 10 MG/1
10 TABLET ORAL DAILY
Qty: 90 TABLET | Refills: 1 | Status: SHIPPED | OUTPATIENT
Start: 2021-02-10 | End: 2021-04-29

## 2021-04-20 ENCOUNTER — IMMUNIZATION (OUTPATIENT)
Dept: LAB | Facility: HOSPITAL | Age: 61
End: 2021-04-20
Attending: HOSPITALIST
Payer: COMMERCIAL

## 2021-04-20 DIAGNOSIS — Z23 NEED FOR VACCINATION: Primary | ICD-10-CM

## 2021-04-20 PROCEDURE — 0011A SARSCOV2 VAC 100MCG/0.5ML IM: CPT

## 2021-04-29 DIAGNOSIS — I10 ESSENTIAL HYPERTENSION: ICD-10-CM

## 2021-04-29 DIAGNOSIS — E03.9 HYPOTHYROIDISM, UNSPECIFIED TYPE: ICD-10-CM

## 2021-04-29 RX ORDER — AMLODIPINE BESYLATE 10 MG/1
10 TABLET ORAL DAILY
Qty: 7 TABLET | Refills: 0 | Status: SHIPPED | OUTPATIENT
Start: 2021-04-29 | End: 2021-08-12

## 2021-04-29 RX ORDER — LOSARTAN POTASSIUM 100 MG/1
100 TABLET ORAL DAILY
Qty: 7 TABLET | Refills: 0 | Status: SHIPPED | OUTPATIENT
Start: 2021-04-29 | End: 2021-08-12

## 2021-04-29 RX ORDER — LEVOTHYROXINE SODIUM 0.1 MG/1
100 TABLET ORAL DAILY
Qty: 7 TABLET | Refills: 0 | Status: SHIPPED | OUTPATIENT
Start: 2021-04-29 | End: 2021-08-12

## 2021-04-29 NOTE — TELEPHONE ENCOUNTER
Follow up never done for thyroid levels as directed. Patient was directed to follow up after one month with Dr Neftaly Johnson when blood work was completed in August.  Will send in 7 day supply but patient needs to follow up for blood work in order to receive further refills.

## 2021-05-18 ENCOUNTER — IMMUNIZATION (OUTPATIENT)
Dept: LAB | Facility: HOSPITAL | Age: 61
End: 2021-05-18
Attending: EMERGENCY MEDICINE
Payer: COMMERCIAL

## 2021-05-18 DIAGNOSIS — Z23 NEED FOR VACCINATION: Primary | ICD-10-CM

## 2021-05-18 PROCEDURE — 0012A SARSCOV2 VAC 100MCG/0.5ML IM: CPT

## 2021-06-17 DIAGNOSIS — E03.9 HYPOTHYROIDISM, UNSPECIFIED TYPE: ICD-10-CM

## 2021-06-17 DIAGNOSIS — I10 ESSENTIAL HYPERTENSION: ICD-10-CM

## 2021-06-18 RX ORDER — AMLODIPINE BESYLATE 10 MG/1
10 TABLET ORAL DAILY
Qty: 7 TABLET | Refills: 0 | OUTPATIENT
Start: 2021-06-18

## 2021-06-18 RX ORDER — LOSARTAN POTASSIUM 100 MG/1
100 TABLET ORAL DAILY
Qty: 7 TABLET | Refills: 0 | OUTPATIENT
Start: 2021-06-18

## 2021-06-18 RX ORDER — LEVOTHYROXINE SODIUM 0.1 MG/1
100 TABLET ORAL DAILY
Qty: 7 TABLET | Refills: 0 | OUTPATIENT
Start: 2021-06-18

## 2021-06-24 NOTE — ANESTHESIA POSTPROCEDURE EVALUATION
Patient: Alberto Cavazos    Procedure Summary     Date:  03/01/18 Room / Location:  [address] [place] [address] MAIN OR 04 / [address] [place] [address] MAIN OR    Anesthesia Start:  6358 Anesthesia Stop:      Procedure:  LAPAROSCOPIC PERFORATED ULCER REPAIR (N/A ) Diagnosis:       Perforated ulcer ([facility]) [Follow-Up Evaluation] : a follow-up evaluation for [Insomnia] : insomnia [Mother] : mother [Medical Records] : medical records

## 2021-08-11 DIAGNOSIS — I10 ESSENTIAL HYPERTENSION: ICD-10-CM

## 2021-08-11 DIAGNOSIS — E03.9 HYPOTHYROIDISM, UNSPECIFIED TYPE: ICD-10-CM

## 2021-08-11 RX ORDER — AMLODIPINE BESYLATE 10 MG/1
10 TABLET ORAL DAILY
Qty: 7 TABLET | Refills: 0 | Status: CANCELLED | OUTPATIENT
Start: 2021-08-11

## 2021-08-11 RX ORDER — LEVOTHYROXINE SODIUM 0.1 MG/1
100 TABLET ORAL DAILY
Qty: 7 TABLET | Refills: 0 | Status: CANCELLED | OUTPATIENT
Start: 2021-08-11

## 2021-08-12 ENCOUNTER — OFFICE VISIT (OUTPATIENT)
Dept: FAMILY MEDICINE CLINIC | Facility: CLINIC | Age: 61
End: 2021-08-12
Payer: COMMERCIAL

## 2021-08-12 ENCOUNTER — LAB ENCOUNTER (OUTPATIENT)
Dept: LAB | Age: 61
End: 2021-08-12
Attending: FAMILY MEDICINE
Payer: COMMERCIAL

## 2021-08-12 VITALS
BODY MASS INDEX: 24.59 KG/M2 | HEART RATE: 88 BPM | SYSTOLIC BLOOD PRESSURE: 141 MMHG | HEIGHT: 71 IN | DIASTOLIC BLOOD PRESSURE: 75 MMHG | TEMPERATURE: 97 F | WEIGHT: 175.63 LBS

## 2021-08-12 DIAGNOSIS — Z87.19 HISTORY OF GI BLEED: ICD-10-CM

## 2021-08-12 DIAGNOSIS — Z12.31 VISIT FOR SCREENING MAMMOGRAM: ICD-10-CM

## 2021-08-12 DIAGNOSIS — I10 ESSENTIAL HYPERTENSION: ICD-10-CM

## 2021-08-12 DIAGNOSIS — E78.5 HYPERLIPIDEMIA, UNSPECIFIED HYPERLIPIDEMIA TYPE: Primary | ICD-10-CM

## 2021-08-12 DIAGNOSIS — E03.9 HYPOTHYROIDISM, UNSPECIFIED TYPE: ICD-10-CM

## 2021-08-12 DIAGNOSIS — F10.10 ALCOHOL ABUSE: ICD-10-CM

## 2021-08-12 DIAGNOSIS — Z12.11 ENCOUNTER FOR SCREENING COLONOSCOPY: ICD-10-CM

## 2021-08-12 DIAGNOSIS — Z00.00 WELL ADULT EXAM: ICD-10-CM

## 2021-08-12 PROBLEM — K92.2 GASTROINTESTINAL HEMORRHAGE: Status: RESOLVED | Noted: 2018-09-17 | Resolved: 2021-08-12

## 2021-08-12 PROBLEM — R57.9 SHOCK (HCC): Status: RESOLVED | Noted: 2018-03-01 | Resolved: 2021-08-12

## 2021-08-12 PROBLEM — K63.1 PERFORATED BOWEL (HCC): Status: RESOLVED | Noted: 2018-03-01 | Resolved: 2021-08-12

## 2021-08-12 PROBLEM — E87.8 LOW BICARBONATE: Status: RESOLVED | Noted: 2018-03-01 | Resolved: 2021-08-12

## 2021-08-12 PROBLEM — N17.9 AKI (ACUTE KIDNEY INJURY) (HCC): Status: RESOLVED | Noted: 2018-03-01 | Resolved: 2021-08-12

## 2021-08-12 PROBLEM — K92.2 GASTROINTESTINAL HEMORRHAGE, UNSPECIFIED GASTROINTESTINAL HEMORRHAGE TYPE: Status: RESOLVED | Noted: 2018-09-17 | Resolved: 2021-08-12

## 2021-08-12 PROBLEM — N17.9 AKI (ACUTE KIDNEY INJURY): Status: RESOLVED | Noted: 2018-03-01 | Resolved: 2021-08-12

## 2021-08-12 LAB
ALBUMIN SERPL-MCNC: 4.6 G/DL (ref 3.4–5)
ALBUMIN/GLOB SERPL: 1.1 {RATIO} (ref 1–2)
ALP LIVER SERPL-CCNC: 76 U/L
ALT SERPL-CCNC: 29 U/L
ANION GAP SERPL CALC-SCNC: 7 MMOL/L (ref 0–18)
AST SERPL-CCNC: 28 U/L (ref 15–37)
BASOPHILS # BLD AUTO: 0.06 X10(3) UL (ref 0–0.2)
BASOPHILS NFR BLD AUTO: 1.2 %
BILIRUB SERPL-MCNC: 0.5 MG/DL (ref 0.1–2)
BUN BLD-MCNC: 19 MG/DL (ref 7–18)
BUN/CREAT SERPL: 20.4 (ref 10–20)
CALCIUM BLD-MCNC: 9.3 MG/DL (ref 8.5–10.1)
CHLORIDE SERPL-SCNC: 105 MMOL/L (ref 98–112)
CHOLEST SMN-MCNC: 269 MG/DL (ref ?–200)
CO2 SERPL-SCNC: 25 MMOL/L (ref 21–32)
CREAT BLD-MCNC: 0.93 MG/DL
DEPRECATED RDW RBC AUTO: 54.1 FL (ref 35.1–46.3)
EOSINOPHIL # BLD AUTO: 0.07 X10(3) UL (ref 0–0.7)
EOSINOPHIL NFR BLD AUTO: 1.4 %
ERYTHROCYTE [DISTWIDTH] IN BLOOD BY AUTOMATED COUNT: 15.7 % (ref 11–15)
GLOBULIN PLAS-MCNC: 4.3 G/DL (ref 2.8–4.4)
GLUCOSE BLD-MCNC: 98 MG/DL (ref 70–99)
HCT VFR BLD AUTO: 40.3 %
HDLC SERPL-MCNC: 168 MG/DL (ref 40–59)
HGB BLD-MCNC: 13 G/DL
IMM GRANULOCYTES # BLD AUTO: 0.03 X10(3) UL (ref 0–1)
IMM GRANULOCYTES NFR BLD: 0.6 %
LDLC SERPL CALC-MCNC: 90 MG/DL (ref ?–100)
LYMPHOCYTES # BLD AUTO: 0.99 X10(3) UL (ref 1–4)
LYMPHOCYTES NFR BLD AUTO: 19.9 %
M PROTEIN MFR SERPL ELPH: 8.9 G/DL (ref 6.4–8.2)
MCH RBC QN AUTO: 30 PG (ref 26–34)
MCHC RBC AUTO-ENTMCNC: 32.3 G/DL (ref 31–37)
MCV RBC AUTO: 92.9 FL
MONOCYTES # BLD AUTO: 0.6 X10(3) UL (ref 0.1–1)
MONOCYTES NFR BLD AUTO: 12.1 %
NEUTROPHILS # BLD AUTO: 3.22 X10 (3) UL (ref 1.5–7.7)
NEUTROPHILS # BLD AUTO: 3.22 X10(3) UL (ref 1.5–7.7)
NEUTROPHILS NFR BLD AUTO: 64.8 %
NONHDLC SERPL-MCNC: 101 MG/DL (ref ?–130)
OSMOLALITY SERPL CALC.SUM OF ELEC: 286 MOSM/KG (ref 275–295)
PATIENT FASTING Y/N/NP: YES
PATIENT FASTING Y/N/NP: YES
PLATELET # BLD AUTO: 281 10(3)UL (ref 150–450)
POTASSIUM SERPL-SCNC: 4.2 MMOL/L (ref 3.5–5.1)
RBC # BLD AUTO: 4.34 X10(6)UL
SODIUM SERPL-SCNC: 137 MMOL/L (ref 136–145)
T4 FREE SERPL-MCNC: 0.7 NG/DL (ref 0.8–1.7)
TRIGL SERPL-MCNC: 72 MG/DL (ref 30–149)
TSI SER-ACNC: 14.6 MIU/ML (ref 0.36–3.74)
VIT B12 SERPL-MCNC: 1760 PG/ML (ref 193–986)
VIT D+METAB SERPL-MCNC: 37.2 NG/ML (ref 30–100)
VLDLC SERPL CALC-MCNC: 11 MG/DL (ref 0–30)
WBC # BLD AUTO: 5 X10(3) UL (ref 4–11)

## 2021-08-12 PROCEDURE — 3077F SYST BP >= 140 MM HG: CPT | Performed by: FAMILY MEDICINE

## 2021-08-12 PROCEDURE — 36415 COLL VENOUS BLD VENIPUNCTURE: CPT

## 2021-08-12 PROCEDURE — 85025 COMPLETE CBC W/AUTO DIFF WBC: CPT

## 2021-08-12 PROCEDURE — 80061 LIPID PANEL: CPT

## 2021-08-12 PROCEDURE — 3078F DIAST BP <80 MM HG: CPT | Performed by: FAMILY MEDICINE

## 2021-08-12 PROCEDURE — 82306 VITAMIN D 25 HYDROXY: CPT

## 2021-08-12 PROCEDURE — 80053 COMPREHEN METABOLIC PANEL: CPT

## 2021-08-12 PROCEDURE — 82607 VITAMIN B-12: CPT

## 2021-08-12 PROCEDURE — 84439 ASSAY OF FREE THYROXINE: CPT

## 2021-08-12 PROCEDURE — 3008F BODY MASS INDEX DOCD: CPT | Performed by: FAMILY MEDICINE

## 2021-08-12 PROCEDURE — 99396 PREV VISIT EST AGE 40-64: CPT | Performed by: FAMILY MEDICINE

## 2021-08-12 PROCEDURE — 84443 ASSAY THYROID STIM HORMONE: CPT

## 2021-08-12 RX ORDER — LOSARTAN POTASSIUM AND HYDROCHLOROTHIAZIDE 12.5; 1 MG/1; MG/1
1 TABLET ORAL DAILY
Qty: 90 TABLET | Refills: 4 | Status: SHIPPED | OUTPATIENT
Start: 2021-08-12 | End: 2021-10-06

## 2021-08-12 RX ORDER — AMLODIPINE BESYLATE 10 MG/1
10 TABLET ORAL DAILY
Qty: 90 TABLET | Refills: 4 | Status: SHIPPED | OUTPATIENT
Start: 2021-08-12 | End: 2021-10-06

## 2021-08-12 RX ORDER — LOSARTAN POTASSIUM 100 MG/1
100 TABLET ORAL DAILY
Qty: 7 TABLET | Refills: 0 | OUTPATIENT
Start: 2021-08-12

## 2021-08-12 RX ORDER — LEVOTHYROXINE SODIUM 0.1 MG/1
100 TABLET ORAL DAILY
Qty: 90 TABLET | Refills: 4 | Status: SHIPPED | OUTPATIENT
Start: 2021-08-12 | End: 2021-10-06

## 2021-08-12 NOTE — PROGRESS NOTES
HPI:   Evie Atwood is a 57year old female who presents for a complete physical exam.    Walks regularly - 6 miles daily - 3 miles on treadmill then 3 outside and rides her stationary bike. Drinking still a bottle of wine a day. Stomach has been ok.    Rep 11 Glasses of wine per week      Comment: few days a week    Drug use: No         REVIEW OF SYSTEMS:   GENERAL: feels well otherwise  SKIN: denies any skin lesions  EYES:denies blurred vision or double vision  HEENT: denies nasal congestion, sinus pain or METABOLIC PANEL (14); Future  - LIPID PANEL; Future  - TSH W REFLEX TO FREE T4; Future  - VITAMIN D; Future  - VITAMIN B12; Future    7. Visit for screening mammogram  - [facility] TIFFANIE 2D+3D SCREENING BILAT (CPT=77067/49430); Future    8.  Encounter for screening

## 2021-08-13 RX ORDER — LEVOTHYROXINE SODIUM 0.1 MG/1
100 TABLET ORAL DAILY
Qty: 90 TABLET | Refills: 4 | OUTPATIENT
Start: 2021-08-13

## 2021-08-13 RX ORDER — LOSARTAN POTASSIUM AND HYDROCHLOROTHIAZIDE 12.5; 1 MG/1; MG/1
1 TABLET ORAL DAILY
Qty: 90 TABLET | Refills: 4 | OUTPATIENT
Start: 2021-08-13

## 2021-08-13 RX ORDER — AMLODIPINE BESYLATE 10 MG/1
10 TABLET ORAL DAILY
Qty: 90 TABLET | Refills: 4 | OUTPATIENT
Start: 2021-08-13

## 2021-08-19 NOTE — PROGRESS NOTES
Yojana Po - Are you taking your levothyroxine daily on an empty stomach? Levels are low.  Rest of the labs are stable. - Dr. Flower Lambert

## 2021-08-23 ENCOUNTER — PATIENT MESSAGE (OUTPATIENT)
Dept: FAMILY MEDICINE CLINIC | Facility: CLINIC | Age: 61
End: 2021-08-23

## 2021-08-24 RX ORDER — ROSUVASTATIN CALCIUM 5 MG/1
5 TABLET, COATED ORAL NIGHTLY
Qty: 90 TABLET | Refills: 4 | Status: SHIPPED | OUTPATIENT
Start: 2021-08-24 | End: 2021-10-06

## 2021-08-24 RX ORDER — LEVOTHYROXINE SODIUM 0.12 MG/1
125 TABLET ORAL
Qty: 90 TABLET | Refills: 1 | Status: SHIPPED | OUTPATIENT
Start: 2021-08-24 | End: 2021-08-27

## 2021-08-24 NOTE — TELEPHONE ENCOUNTER
From: Nikolai Trevino  To: Jacky Archer MD  Sent: 8/23/2021 1:46 PM CDT  Subject: Visit Follow-up Question    I am taking my medicine on an empty stomach. I would like to know if my paperwork was sent out.  It must be received by the end of August. If it isn

## 2021-08-24 NOTE — PROGRESS NOTES
FYI = pt responded on [facility]. I will send message on My Chart. Yfn Ann - I am going to increase your levothyroxine to 125 mcg daily and start you on medications in the evenings for your cholesterol - crestor 5 mg. Your cholesterol is very high.  Please re

## 2021-08-24 NOTE — TELEPHONE ENCOUNTER
Spoke with patient (verified full name) Informed patient that physician result form was sent to fax# [phone]. Patient verbalized understanding and requested a copy be sent to address on file. Verified home address with patient.

## 2021-08-29 RX ORDER — LEVOTHYROXINE SODIUM 0.12 MG/1
125 TABLET ORAL
Qty: 90 TABLET | Refills: 1 | Status: SHIPPED | OUTPATIENT
Start: 2021-08-29 | End: 2021-10-06

## 2021-09-22 DIAGNOSIS — E03.9 HYPOTHYROIDISM, UNSPECIFIED TYPE: ICD-10-CM

## 2021-09-22 DIAGNOSIS — I10 ESSENTIAL HYPERTENSION: ICD-10-CM

## 2021-09-22 RX ORDER — LEVOTHYROXINE SODIUM 0.12 MG/1
125 TABLET ORAL
Qty: 90 TABLET | Refills: 1 | OUTPATIENT
Start: 2021-09-22

## 2021-09-22 RX ORDER — LEVOTHYROXINE SODIUM 0.1 MG/1
100 TABLET ORAL DAILY
Qty: 90 TABLET | Refills: 4 | OUTPATIENT
Start: 2021-09-22

## 2021-09-22 RX ORDER — ROSUVASTATIN CALCIUM 5 MG/1
5 TABLET, COATED ORAL NIGHTLY
Qty: 90 TABLET | Refills: 4 | OUTPATIENT
Start: 2021-09-22

## 2021-09-22 RX ORDER — AMLODIPINE BESYLATE 10 MG/1
10 TABLET ORAL DAILY
Qty: 90 TABLET | Refills: 4 | OUTPATIENT
Start: 2021-09-22

## 2021-09-22 NOTE — TELEPHONE ENCOUNTER
Refill passed per [facility], [facility] protocol. Requested Prescriptions   Pending Prescriptions Disp Refills    amLODIPine 10 MG Oral Tab 90 tablet 4     Sig: Take 1 tablet (10 mg total) by mouth daily.         Hypertensive Medications Protocol Passed - 9/ type    Cristian Andressa, Royal Wiley MD    Office Visit    8 months ago Pain in left wrist    [address], [place], [address] [facility] Visit    8 months ago Closed traumatic nondisplaced fracture of

## 2021-10-06 DIAGNOSIS — I10 ESSENTIAL HYPERTENSION: ICD-10-CM

## 2021-10-06 DIAGNOSIS — E03.9 HYPOTHYROIDISM, UNSPECIFIED TYPE: ICD-10-CM

## 2021-10-07 RX ORDER — LOSARTAN POTASSIUM AND HYDROCHLOROTHIAZIDE 12.5; 1 MG/1; MG/1
1 TABLET ORAL DAILY
Qty: 90 TABLET | Refills: 4 | Status: SHIPPED | OUTPATIENT
Start: 2021-10-07 | End: 2021-12-22

## 2021-10-07 RX ORDER — AMLODIPINE BESYLATE 10 MG/1
10 TABLET ORAL DAILY
Qty: 90 TABLET | Refills: 4 | Status: SHIPPED | OUTPATIENT
Start: 2021-10-07 | End: 2021-12-22

## 2021-10-07 RX ORDER — LEVOTHYROXINE SODIUM 0.1 MG/1
100 TABLET ORAL DAILY
Qty: 90 TABLET | Refills: 4 | Status: SHIPPED | OUTPATIENT
Start: 2021-10-07 | End: 2021-12-22

## 2021-10-07 RX ORDER — ROSUVASTATIN CALCIUM 5 MG/1
5 TABLET, COATED ORAL NIGHTLY
Qty: 90 TABLET | Refills: 4 | Status: SHIPPED | OUTPATIENT
Start: 2021-10-07 | End: 2021-10-31

## 2021-10-07 RX ORDER — LEVOTHYROXINE SODIUM 0.12 MG/1
125 TABLET ORAL
Qty: 90 TABLET | Refills: 1 | Status: SHIPPED | OUTPATIENT
Start: 2021-10-07 | End: 2021-12-22

## 2021-10-31 RX ORDER — ROSUVASTATIN CALCIUM 5 MG/1
5 TABLET, COATED ORAL NIGHTLY
Qty: 90 TABLET | Refills: 1 | Status: SHIPPED | OUTPATIENT
Start: 2021-10-31 | End: 2021-12-22

## 2021-10-31 NOTE — TELEPHONE ENCOUNTER
Refill passed per [employer] [facility] protocol. Requested Prescriptions   Pending Prescriptions Disp Refills    rosuvastatin 5 MG Oral Tab 90 tablet 4     Sig: Take 1 tablet (5 mg total) by mouth nightly.         Cholesterol Medication Protocol Passed - 10/31/2021 11:31 AM        Passed - ALT in past 12 months        Passed - LDL in past 12 months        Passed - Last ALT < 80       Lab Results   Component Value Date    ALT 29 08/12/2021             Passed - Last LDL < 130     Lab Results   Component Value Date    LDL 90 08/12/2021               Passed - Appointment in past 12 or next 3 months              Recent Outpatient Visits              2 months ago Hyperlipidemia, unspecified hyperlipidemia type    Yasmin Castrejon, Jeremias Hollis MD    Office Visit    10 months ago Pain in left wrist    Physical [address], Jeffery Cox, [address] Visit    10 months ago Closed traumatic nondisplaced fracture of distal end of right radius, initial encounter    [address], Razia Garcia MD    Office Visit    1 year ago Essential hypertension    [employer], [facility], [address], [facility] Jeanette Downs APRN    Office Visit    1 year ago Duodenal ulcer    Yasmin Castrejon, Duong Phillips MD    Office Visit

## 2021-12-22 DIAGNOSIS — E03.9 HYPOTHYROIDISM, UNSPECIFIED TYPE: ICD-10-CM

## 2021-12-22 DIAGNOSIS — I10 ESSENTIAL HYPERTENSION: ICD-10-CM

## 2021-12-23 RX ORDER — LOSARTAN POTASSIUM AND HYDROCHLOROTHIAZIDE 12.5; 1 MG/1; MG/1
1 TABLET ORAL DAILY
Qty: 90 TABLET | Refills: 4 | Status: SHIPPED | OUTPATIENT
Start: 2021-12-23

## 2021-12-23 RX ORDER — LEVOTHYROXINE SODIUM 0.12 MG/1
125 TABLET ORAL
Qty: 90 TABLET | Refills: 1 | Status: SHIPPED | OUTPATIENT
Start: 2021-12-23

## 2021-12-23 RX ORDER — LEVOTHYROXINE SODIUM 0.1 MG/1
100 TABLET ORAL DAILY
Qty: 90 TABLET | Refills: 4 | Status: SHIPPED | OUTPATIENT
Start: 2021-12-23

## 2021-12-23 RX ORDER — ROSUVASTATIN CALCIUM 5 MG/1
5 TABLET, COATED ORAL NIGHTLY
Qty: 90 TABLET | Refills: 1 | Status: SHIPPED | OUTPATIENT
Start: 2021-12-23

## 2021-12-23 RX ORDER — AMLODIPINE BESYLATE 10 MG/1
10 TABLET ORAL DAILY
Qty: 90 TABLET | Refills: 4 | Status: SHIPPED | OUTPATIENT
Start: 2021-12-23

## 2022-02-16 ENCOUNTER — TELEPHONE (OUTPATIENT)
Dept: FAMILY MEDICINE CLINIC | Facility: CLINIC | Age: 62
End: 2022-02-16

## 2022-03-11 RX ORDER — LEVOTHYROXINE SODIUM 0.12 MG/1
125 TABLET ORAL
Qty: 90 TABLET | Refills: 1 | OUTPATIENT
Start: 2022-03-11

## 2022-03-11 RX ORDER — LOSARTAN POTASSIUM AND HYDROCHLOROTHIAZIDE 12.5; 1 MG/1; MG/1
1 TABLET ORAL DAILY
Qty: 90 TABLET | Refills: 4 | OUTPATIENT
Start: 2022-03-11

## 2022-03-11 RX ORDER — ROSUVASTATIN CALCIUM 5 MG/1
5 TABLET, COATED ORAL NIGHTLY
Qty: 90 TABLET | Refills: 1 | OUTPATIENT
Start: 2022-03-11

## 2022-03-11 RX ORDER — AMLODIPINE BESYLATE 10 MG/1
10 TABLET ORAL DAILY
Qty: 90 TABLET | Refills: 4 | OUTPATIENT
Start: 2022-03-11

## 2022-03-13 DIAGNOSIS — I10 ESSENTIAL HYPERTENSION: ICD-10-CM

## 2022-03-14 RX ORDER — LOSARTAN POTASSIUM AND HYDROCHLOROTHIAZIDE 12.5; 1 MG/1; MG/1
1 TABLET ORAL DAILY
Qty: 90 TABLET | Refills: 4 | OUTPATIENT
Start: 2022-03-14

## 2022-03-14 RX ORDER — LEVOTHYROXINE SODIUM 0.12 MG/1
125 TABLET ORAL
Qty: 90 TABLET | Refills: 1 | OUTPATIENT
Start: 2022-03-14

## 2022-03-14 RX ORDER — ROSUVASTATIN CALCIUM 5 MG/1
5 TABLET, COATED ORAL NIGHTLY
Qty: 90 TABLET | Refills: 1 | OUTPATIENT
Start: 2022-03-14

## 2022-03-14 RX ORDER — AMLODIPINE BESYLATE 10 MG/1
10 TABLET ORAL DAILY
Qty: 90 TABLET | Refills: 4 | OUTPATIENT
Start: 2022-03-14

## 2022-06-07 DIAGNOSIS — I10 ESSENTIAL HYPERTENSION: ICD-10-CM

## 2022-06-08 RX ORDER — LEVOTHYROXINE SODIUM 0.12 MG/1
125 TABLET ORAL
Qty: 90 TABLET | Refills: 1 | OUTPATIENT
Start: 2022-06-08

## 2022-06-08 RX ORDER — LOSARTAN POTASSIUM AND HYDROCHLOROTHIAZIDE 12.5; 1 MG/1; MG/1
1 TABLET ORAL DAILY
Qty: 90 TABLET | Refills: 4 | OUTPATIENT
Start: 2022-06-08

## 2022-06-08 RX ORDER — AMLODIPINE BESYLATE 10 MG/1
10 TABLET ORAL DAILY
Qty: 90 TABLET | Refills: 4 | OUTPATIENT
Start: 2022-06-08

## 2022-07-22 DIAGNOSIS — I10 ESSENTIAL HYPERTENSION: ICD-10-CM

## 2022-07-23 RX ORDER — LOSARTAN POTASSIUM AND HYDROCHLOROTHIAZIDE 12.5; 1 MG/1; MG/1
1 TABLET ORAL DAILY
Qty: 90 TABLET | Refills: 4 | Status: SHIPPED | OUTPATIENT
Start: 2022-07-23 | End: 2022-09-28

## 2022-07-23 RX ORDER — LEVOTHYROXINE SODIUM 0.12 MG/1
125 TABLET ORAL
Qty: 90 TABLET | Refills: 1 | Status: SHIPPED | OUTPATIENT
Start: 2022-07-23 | End: 2022-09-28

## 2022-07-23 RX ORDER — AMLODIPINE BESYLATE 10 MG/1
10 TABLET ORAL DAILY
Qty: 90 TABLET | Refills: 4 | Status: SHIPPED | OUTPATIENT
Start: 2022-07-23 | End: 2022-09-28

## 2022-07-23 RX ORDER — ROSUVASTATIN CALCIUM 5 MG/1
5 TABLET, COATED ORAL NIGHTLY
Qty: 90 TABLET | Refills: 1 | Status: SHIPPED | OUTPATIENT
Start: 2022-07-23 | End: 2022-09-28

## 2022-08-25 DIAGNOSIS — I10 ESSENTIAL HYPERTENSION: ICD-10-CM

## 2022-08-25 RX ORDER — AMLODIPINE BESYLATE 10 MG/1
10 TABLET ORAL DAILY
Qty: 90 TABLET | Refills: 4 | OUTPATIENT
Start: 2022-08-25

## 2022-08-25 RX ORDER — LOSARTAN POTASSIUM AND HYDROCHLOROTHIAZIDE 12.5; 1 MG/1; MG/1
1 TABLET ORAL DAILY
Qty: 90 TABLET | Refills: 4 | OUTPATIENT
Start: 2022-08-25

## 2022-08-25 RX ORDER — LEVOTHYROXINE SODIUM 0.12 MG/1
125 TABLET ORAL
Qty: 90 TABLET | Refills: 1 | OUTPATIENT
Start: 2022-08-25

## 2022-08-25 RX ORDER — ROSUVASTATIN CALCIUM 5 MG/1
5 TABLET, COATED ORAL NIGHTLY
Qty: 90 TABLET | Refills: 1 | OUTPATIENT
Start: 2022-08-25

## 2022-09-28 ENCOUNTER — LAB ENCOUNTER (OUTPATIENT)
Dept: LAB | Age: 62
End: 2022-09-28
Attending: FAMILY MEDICINE
Payer: COMMERCIAL

## 2022-09-28 ENCOUNTER — OFFICE VISIT (OUTPATIENT)
Dept: FAMILY MEDICINE CLINIC | Facility: CLINIC | Age: 62
End: 2022-09-28

## 2022-09-28 VITALS
TEMPERATURE: 98 F | WEIGHT: 188 LBS | HEIGHT: 71 IN | DIASTOLIC BLOOD PRESSURE: 77 MMHG | SYSTOLIC BLOOD PRESSURE: 123 MMHG | BODY MASS INDEX: 26.32 KG/M2 | HEART RATE: 102 BPM

## 2022-09-28 DIAGNOSIS — Z12.31 VISIT FOR SCREENING MAMMOGRAM: ICD-10-CM

## 2022-09-28 DIAGNOSIS — F10.10 ALCOHOL ABUSE: ICD-10-CM

## 2022-09-28 DIAGNOSIS — Z87.19 HISTORY OF GI BLEED: ICD-10-CM

## 2022-09-28 DIAGNOSIS — Z00.00 WELL ADULT EXAM: ICD-10-CM

## 2022-09-28 DIAGNOSIS — I10 ESSENTIAL HYPERTENSION: Primary | ICD-10-CM

## 2022-09-28 DIAGNOSIS — E03.9 HYPOTHYROIDISM, UNSPECIFIED TYPE: ICD-10-CM

## 2022-09-28 DIAGNOSIS — E55.9 VITAMIN D DEFICIENCY: ICD-10-CM

## 2022-09-28 DIAGNOSIS — Z12.11 ENCOUNTER FOR SCREENING COLONOSCOPY: ICD-10-CM

## 2022-09-28 DIAGNOSIS — E78.5 HYPERLIPIDEMIA, UNSPECIFIED HYPERLIPIDEMIA TYPE: ICD-10-CM

## 2022-09-28 LAB
ALBUMIN SERPL-MCNC: 4.2 G/DL (ref 3.4–5)
ALBUMIN/GLOB SERPL: 1.1 {RATIO} (ref 1–2)
ALP LIVER SERPL-CCNC: 79 U/L
ALT SERPL-CCNC: 37 U/L
ANION GAP SERPL CALC-SCNC: 9 MMOL/L (ref 0–18)
AST SERPL-CCNC: 29 U/L (ref 15–37)
BASOPHILS # BLD AUTO: 0.07 X10(3) UL (ref 0–0.2)
BASOPHILS NFR BLD AUTO: 1.1 %
BILIRUB SERPL-MCNC: 0.8 MG/DL (ref 0.1–2)
BUN BLD-MCNC: 29 MG/DL (ref 7–18)
BUN/CREAT SERPL: 22.3 (ref 10–20)
CALCIUM BLD-MCNC: 9.5 MG/DL (ref 8.5–10.1)
CHLORIDE SERPL-SCNC: 104 MMOL/L (ref 98–112)
CHOLEST SERPL-MCNC: 169 MG/DL (ref ?–200)
CO2 SERPL-SCNC: 25 MMOL/L (ref 21–32)
CREAT BLD-MCNC: 1.3 MG/DL
DEPRECATED RDW RBC AUTO: 44 FL (ref 35.1–46.3)
EOSINOPHIL # BLD AUTO: 0.11 X10(3) UL (ref 0–0.7)
EOSINOPHIL NFR BLD AUTO: 1.7 %
ERYTHROCYTE [DISTWIDTH] IN BLOOD BY AUTOMATED COUNT: 12.9 % (ref 11–15)
FASTING PATIENT LIPID ANSWER: YES
FASTING STATUS PATIENT QL REPORTED: YES
GFR SERPLBLD BASED ON 1.73 SQ M-ARVRAT: 46 ML/MIN/1.73M2 (ref 60–?)
GLOBULIN PLAS-MCNC: 3.7 G/DL (ref 2.8–4.4)
GLUCOSE BLD-MCNC: 109 MG/DL (ref 70–99)
HCT VFR BLD AUTO: 39 %
HDLC SERPL-MCNC: 92 MG/DL (ref 40–59)
HGB BLD-MCNC: 12.9 G/DL
IMM GRANULOCYTES # BLD AUTO: 0.03 X10(3) UL (ref 0–1)
IMM GRANULOCYTES NFR BLD: 0.5 %
LDLC SERPL CALC-MCNC: 60 MG/DL (ref ?–100)
LYMPHOCYTES # BLD AUTO: 0.86 X10(3) UL (ref 1–4)
LYMPHOCYTES NFR BLD AUTO: 13.5 %
MCH RBC QN AUTO: 30.8 PG (ref 26–34)
MCHC RBC AUTO-ENTMCNC: 33.1 G/DL (ref 31–37)
MCV RBC AUTO: 93.1 FL
MONOCYTES # BLD AUTO: 0.63 X10(3) UL (ref 0.1–1)
MONOCYTES NFR BLD AUTO: 9.9 %
NEUTROPHILS # BLD AUTO: 4.67 X10 (3) UL (ref 1.5–7.7)
NEUTROPHILS # BLD AUTO: 4.67 X10(3) UL (ref 1.5–7.7)
NEUTROPHILS NFR BLD AUTO: 73.3 %
NONHDLC SERPL-MCNC: 77 MG/DL (ref ?–130)
OSMOLALITY SERPL CALC.SUM OF ELEC: 292 MOSM/KG (ref 275–295)
PLATELET # BLD AUTO: 327 10(3)UL (ref 150–450)
POTASSIUM SERPL-SCNC: 4.7 MMOL/L (ref 3.5–5.1)
PROT SERPL-MCNC: 7.9 G/DL (ref 6.4–8.2)
RBC # BLD AUTO: 4.19 X10(6)UL
SODIUM SERPL-SCNC: 138 MMOL/L (ref 136–145)
T3FREE SERPL-MCNC: 2.83 PG/ML (ref 2.4–4.2)
T4 FREE SERPL-MCNC: 1.3 NG/DL (ref 0.8–1.7)
TRIGL SERPL-MCNC: 94 MG/DL (ref 30–149)
TSI SER-ACNC: 0.14 MIU/ML (ref 0.36–3.74)
VIT B12 SERPL-MCNC: 577 PG/ML (ref 193–986)
VIT D+METAB SERPL-MCNC: 40.2 NG/ML (ref 30–100)
VLDLC SERPL CALC-MCNC: 14 MG/DL (ref 0–30)
WBC # BLD AUTO: 6.4 X10(3) UL (ref 4–11)

## 2022-09-28 PROCEDURE — 84443 ASSAY THYROID STIM HORMONE: CPT

## 2022-09-28 PROCEDURE — 80061 LIPID PANEL: CPT

## 2022-09-28 PROCEDURE — 36415 COLL VENOUS BLD VENIPUNCTURE: CPT

## 2022-09-28 PROCEDURE — 99396 PREV VISIT EST AGE 40-64: CPT | Performed by: FAMILY MEDICINE

## 2022-09-28 PROCEDURE — 3078F DIAST BP <80 MM HG: CPT | Performed by: FAMILY MEDICINE

## 2022-09-28 PROCEDURE — 82306 VITAMIN D 25 HYDROXY: CPT

## 2022-09-28 PROCEDURE — 84481 FREE ASSAY (FT-3): CPT

## 2022-09-28 PROCEDURE — 84439 ASSAY OF FREE THYROXINE: CPT

## 2022-09-28 PROCEDURE — 3008F BODY MASS INDEX DOCD: CPT | Performed by: FAMILY MEDICINE

## 2022-09-28 PROCEDURE — 3074F SYST BP LT 130 MM HG: CPT | Performed by: FAMILY MEDICINE

## 2022-09-28 PROCEDURE — 82607 VITAMIN B-12: CPT

## 2022-09-28 PROCEDURE — 80053 COMPREHEN METABOLIC PANEL: CPT

## 2022-09-28 PROCEDURE — 85025 COMPLETE CBC W/AUTO DIFF WBC: CPT

## 2022-09-28 RX ORDER — AMLODIPINE BESYLATE 10 MG/1
10 TABLET ORAL DAILY
Qty: 90 TABLET | Refills: 4 | Status: SHIPPED | OUTPATIENT
Start: 2022-09-28

## 2022-09-28 RX ORDER — LEVOTHYROXINE SODIUM 0.12 MG/1
125 TABLET ORAL
Qty: 90 TABLET | Refills: 4 | Status: SHIPPED | OUTPATIENT
Start: 2022-09-28

## 2022-09-28 RX ORDER — LOSARTAN POTASSIUM AND HYDROCHLOROTHIAZIDE 12.5; 1 MG/1; MG/1
1 TABLET ORAL DAILY
Qty: 90 TABLET | Refills: 4 | Status: SHIPPED | OUTPATIENT
Start: 2022-09-28

## 2022-09-28 RX ORDER — ROSUVASTATIN CALCIUM 5 MG/1
5 TABLET, COATED ORAL NIGHTLY
Qty: 90 TABLET | Refills: 4 | Status: SHIPPED | OUTPATIENT
Start: 2022-09-28

## 2022-10-03 DIAGNOSIS — I10 ESSENTIAL HYPERTENSION: ICD-10-CM

## 2022-10-04 DIAGNOSIS — E03.9 HYPOTHYROIDISM, UNSPECIFIED TYPE: Primary | ICD-10-CM

## 2022-10-04 RX ORDER — LEVOTHYROXINE SODIUM 112 UG/1
112 TABLET ORAL
Qty: 90 TABLET | Refills: 4 | Status: SHIPPED | OUTPATIENT
Start: 2022-10-04

## 2022-10-05 RX ORDER — AMLODIPINE BESYLATE 10 MG/1
10 TABLET ORAL DAILY
Qty: 90 TABLET | Refills: 4 | Status: SHIPPED | OUTPATIENT
Start: 2022-10-05

## 2022-10-05 RX ORDER — LEVOTHYROXINE SODIUM 0.12 MG/1
125 TABLET ORAL
Qty: 90 TABLET | Refills: 4 | OUTPATIENT
Start: 2022-10-05

## 2022-10-05 RX ORDER — LOSARTAN POTASSIUM AND HYDROCHLOROTHIAZIDE 12.5; 1 MG/1; MG/1
1 TABLET ORAL DAILY
Qty: 90 TABLET | Refills: 4 | Status: SHIPPED | OUTPATIENT
Start: 2022-10-05

## 2022-10-05 RX ORDER — ROSUVASTATIN CALCIUM 5 MG/1
5 TABLET, COATED ORAL NIGHTLY
Qty: 90 TABLET | Refills: 4 | Status: SHIPPED | OUTPATIENT
Start: 2022-10-05

## 2022-10-05 NOTE — TELEPHONE ENCOUNTER
Refill passed per [address] protocol. Requested Prescriptions   Pending Prescriptions Disp Refills    amLODIPine 10 MG Oral Tab 90 tablet 4     Sig: Take 1 tablet (10 mg total) by mouth daily.         Hypertensive Medications Protocol Failed - 10/3/2022  9:51 AM        Failed - EGFRCR or GFRNAA > 50     GFR Evaluation  EGFRCR: 46 , resulted on 9/28/2022            Passed - In person appointment in the past 12 or next 3 months       Recent Outpatient Visits              1 week ago Essential hypertension    [address], Vasu Bergeron MD    Office Visit    1 year ago Hyperlipidemia, unspecified hyperlipidemia type    [address], Jeremias Hollis MD    Office Visit    1 year ago Pain in left wrist    Physical Therapy - Lit[place] Downs, Shalonda Cook, OT    Office Visit    1 year ago Closed traumatic nondisplaced fracture of distal end of right radius, initial encounter    [address], Razia Garcia MD    Office Visit    2 years ago Essential hypertension    [address], [address], 1 [facility] Robson Downs, APRMOY    Office Visit                 Passed - Last BP reading less than 140/90     BP Readings from Last 1 Encounters:  09/28/22 : 123/77                Passed - CMP or BMP in past 6 months     Recent Results (from the past 4392 hour(s))   COMP METABOLIC PANEL (14)    Collection Time: 09/28/22 10:17 AM   Result Value Ref Range    Glucose 109 (H) 70 - 99 mg/dL    Sodium 138 136 - 145 mmol/L    Potassium 4.7 3.5 - 5.1 mmol/L    Chloride 104 98 - 112 mmol/L    CO2 25.0 21.0 - 32.0 mmol/L    Anion Gap 9 0 - 18 mmol/L    BUN 29 (H) 7 - 18 mg/dL    Creatinine 1.30 (H) 0.55 - 1.02 mg/dL    BUN/CREA Ratio 22.3 (H) 10.0 - 20.0    Calcium, Total 9.5 8.5 - 10.1 mg/dL    Calculated Osmolality 292 275 - 295 mOsm/kg    eGFR-Cr 46 (L) >=60 mL/min/1.73m2    ALT 37 13 - 56 U/L    AST 29 15 - 37 U/L    Alkaline Phosphatase 79 50 - 130 U/L    Bilirubin, Total 0.8 0.1 - 2.0 mg/dL    Total Protein 7.9 6.4 - 8.2 g/dL    Albumin 4.2 3.4 - 5.0 g/dL    Globulin  3.7 2.8 - 4.4 g/dL    A/G Ratio 1.1 1.0 - 2.0    Patient Fasting for CMP? Yes      *Note: Due to a large number of results and/or encounters for the requested time period, some results have not been displayed. A complete set of results can be found in Results Review. Passed - In person appointment or virtual visit in the past 6 months       Recent Outpatient Visits              1 week ago Essential hypertension    [employer], [address], Hussein Ledezma MD    Office Visit    1 year ago Hyperlipidemia, unspecified hyperlipidemia type    [address], Valeira Cornejo MD    Office Visit    1 year ago Pain in left wrist    [address], Cyndee Crowe, OT    Office Visit    1 year ago Closed traumatic nondisplaced fracture of distal end of right radius, initial encounter    [address], Michelle Bishop MD    Office Visit    2 years ago Essential hypertension    [employer], [address], 1 [facility] Robson Downs, APRN    Office Visit                    Losartan Potassium-HCTZ 100-12.5 MG Oral Tab 90 tablet 4     Sig: Take 1 tablet by mouth daily.         Hypertensive Medications Protocol Failed - 10/3/2022  9:51 AM        Failed - EGFRCR or GFRNAA > 50     GFR Evaluation  EGFRCR: 46 , resulted on 9/28/2022            Passed - In person appointment in the past 12 or next 3 months       Recent Outpatient Visits              1 week ago Essential hypertension    [address] Rojas MD    Office Visit    1 year ago Hyperlipidemia, unspecified hyperlipidemia type    [address] Rojas MD    Office Visit    1 year ago Pain in left wrist    [address], Cyndee Crowe, [address] [place] [address] Visit    1 year ago Closed traumatic nondisplaced fracture of distal end of right radius, initial encounter    [address], Jyoti Hanley MD    Office Visit    2 years ago Essential hypertension    [facility], [facility], [address], 1 [facility] Robson Downs APRN    Office Visit                 Passed - Last BP reading less than 140/90     BP Readings from Last 1 Encounters:  09/28/22 : 123/77                Passed - CMP or BMP in past 6 months     Recent Results (from the past 4392 hour(s))   COMP METABOLIC PANEL (14)    Collection Time: 09/28/22 10:17 AM   Result Value Ref Range    Glucose 109 (H) 70 - 99 mg/dL    Sodium 138 136 - 145 mmol/L    Potassium 4.7 3.5 - 5.1 mmol/L    Chloride 104 98 - 112 mmol/L    CO2 25.0 21.0 - 32.0 mmol/L    Anion Gap 9 0 - 18 mmol/L    BUN 29 (H) 7 - 18 mg/dL    Creatinine 1.30 (H) 0.55 - 1.02 mg/dL    BUN/CREA Ratio 22.3 (H) 10.0 - 20.0    Calcium, Total 9.5 8.5 - 10.1 mg/dL    Calculated Osmolality 292 275 - 295 mOsm/kg    eGFR-Cr 46 (L) >=60 mL/min/1.73m2    ALT 37 13 - 56 U/L    AST 29 15 - 37 U/L    Alkaline Phosphatase 79 50 - 130 U/L    Bilirubin, Total 0.8 0.1 - 2.0 mg/dL    Total Protein 7.9 6.4 - 8.2 g/dL    Albumin 4.2 3.4 - 5.0 g/dL    Globulin  3.7 2.8 - 4.4 g/dL    A/G Ratio 1.1 1.0 - 2.0    Patient Fasting for CMP? Yes      *Note: Due to a large number of results and/or encounters for the requested time period, some results have not been displayed. A complete set of results can be found in Results Review.                  Passed - In person appointment or virtual visit in the past 6 months       Recent Outpatient Visits              1 week ago Essential hypertension    [address] Ro[address] MD    Office Visit    1 year ago Hyperlipidemia, unspecified hyperlipidemia type    [address], Temitope Adams MD    Office Visit    1 year ago Pain in left wrist    Physical Therapy - Litzy [facility] [facility]    Office Visit    1 year ago Closed traumatic nondisplaced fracture of distal end of right radius, initial encounter    [address], Pamela Gautam MD    Office Visit    2 years ago Essential hypertension    [facility], [address], 1 [facility] Robson Downs, KARAN    Office Visit                    levothyroxine 125 MCG Oral Tab 90 tablet 4     Sig: Take 1 tablet (125 mcg total) by mouth before breakfast.        Thyroid Medication Protocol Failed - 10/3/2022  9:51 AM        Failed - Last TSH value is normal     Lab Results   Component Value Date    TSH 0.137 (L) 09/28/2022    THYROIDFUNC 6.00 (H) 08/25/2015                 Passed - TSH in past 12 months        Passed - In person appointment or virtual visit in the past 12 mos or appointment in next 3 mos       Recent Outpatient Visits              1 week ago Essential hypertension    [employer], [address], Palmer Bone MD    Office Visit    1 year ago Hyperlipidemia, unspecified hyperlipidemia type    [address] Palmer Covarrubias MD    Office Visit    1 year ago Pain in left wrist    [facility] [place] [place],     Office Visit    1 year ago Closed traumatic nondisplaced fracture of distal end of right radius, initial encounter    [address], Pamela Gautam MD    Office Visit    2 years ago Essential hypertension    [employer], [address], 1 [facility] Robson Downs, KARAN    Office Visit                    rosuvastatin 5 MG Oral Tab 90 tablet 4     Sig: Take 1 tablet (5 mg total) by mouth nightly.         Cholesterol Medication Protocol Passed - 10/3/2022  9:51 AM        Passed - ALT in past 12 months        Passed - LDL in past 12 months        Passed - Last ALT < 80       Lab Results   Component Value Date    ALT 37 09/28/2022             Passed - Last LDL < 130     Lab Results   Component Value Date    LDL 60 09/28/2022               Passed - In person appointment or virtual visit in the past 12 mos or appointment in next 3 mos       Recent Outpatient Visits              1 week ago Essential hypertension    [address] Paul Needle, MD    Office Visit    1 year ago Hyperlipidemia, unspecified hyperlipidemia type    [address] Anette Divers, MD    Office Visit    1 year ago Pain in left wrist    Physical [address], OT    Office Visit    1 year ago Closed traumatic nondisplaced fracture of distal end of right radius, initial encounter    [address], Bridgette Amin MD    Office Visit    2 years ago Essential hypertension    [address], [address] [facility] Candy Downs, APR    Office Visit                      Recent Outpatient Visits              1 week ago Essential hypertension    [address] Paul Needle, MD    Office Visit    1 year ago Hyperlipidemia, unspecified hyperlipidemia type    [address] Arloa Foots, Anette Divers, MD    Office Visit    1 year ago Pain in left wrist    [facility] Elmo Downs, OT    Office Visit    1 year ago Closed traumatic nondisplaced fracture of distal end of right radius, initial encounter    [address], Janet Pleasant, MD    Office Visit    2 years ago Essential hypertension    [address], [address] [facility] Candy Downs, [address] [place] [address]    Office Visit

## 2022-10-05 NOTE — TELEPHONE ENCOUNTER
Please review. Protocol failed / No protocol. Requested Prescriptions   Pending Prescriptions Disp Refills    amLODIPine 10 MG Oral Tab 90 tablet 4     Sig: Take 1 tablet (10 mg total) by mouth daily.         Hypertensive Medications Protocol Failed - 10/3/2022  9:51 AM        Failed - EGFRCR or GFRNAA > 50     GFR Evaluation  EGFRCR: 46 , resulted on 9/28/2022            Passed - In person appointment in the past 12 or next 3 months       Recent Outpatient Visits              1 week ago Essential hypertension    [address] [facility], Vasu Umana MD    Office Visit    1 year ago Hyperlipidemia, unspecified hyperlipidemia type    [address], Mark Booth MD    Office Visit    1 year ago Pain in left wrist    Physical Therapy - Litzy Downs, Kylee Bunch, OT    Office Visit    1 year ago Closed traumatic nondisplaced fracture of distal end of right radius, initial encounter    [address], Jonnathan Lee MD    Office Visit    2 years ago Essential hypertension    [address] Aguilar, [facility], [address] [place] [address] Robson Downs APRN    Office Visit                 Passed - Last BP reading less than 140/90     BP Readings from Last 1 Encounters:  09/28/22 : 123/77                Passed - CMP or BMP in past 6 months     Recent Results (from the past 4392 hour(s))   COMP METABOLIC PANEL (14)    Collection Time: 09/28/22 10:17 AM   Result Value Ref Range    Glucose 109 (H) 70 - 99 mg/dL    Sodium 138 136 - 145 mmol/L    Potassium 4.7 3.5 - 5.1 mmol/L    Chloride 104 98 - 112 mmol/L    CO2 25.0 21.0 - 32.0 mmol/L    Anion Gap 9 0 - 18 mmol/L    BUN 29 (H) 7 - 18 mg/dL    Creatinine 1.30 (H) 0.55 - 1.02 mg/dL    BUN/CREA Ratio 22.3 (H) 10.0 - 20.0    Calcium, Total 9.5 8.5 - 10.1 mg/dL    Calculated Osmolality 292 275 - 295 mOsm/kg    eGFR-Cr 46 (L) >=60 mL/min/1.73m2    ALT 37 13 - 56 U/L    AST 29 15 - 37 U/L    Alkaline Phosphatase 79 50 - 130 U/L    Bilirubin, Total 0.8 0.1 - 2.0 mg/dL    Total Protein 7.9 6.4 - 8.2 g/dL    Albumin 4.2 3.4 - 5.0 g/dL    Globulin  3.7 2.8 - 4.4 g/dL    A/G Ratio 1.1 1.0 - 2.0    Patient Fasting for CMP? Yes      *Note: Due to a large number of results and/or encounters for the requested time period, some results have not been displayed. A complete set of results can be found in Results Review. Passed - In person appointment or virtual visit in the past 6 months       Recent Outpatient Visits              1 week ago Essential hypertension    [facility], [address], Sara Monzon MD    Office Visit    1 year ago Hyperlipidemia, unspecified hyperlipidemia type    [address], Christiano Fields MD    Office Visit    1 year ago Pain in left wrist    [address], Ryan Johnson,     Office Visit    1 year ago Closed traumatic nondisplaced fracture of distal end of right radius, initial encounter    [address], Austine Babinski, MD    Office Visit    2 years ago Essential hypertension    [facility], [address], 1 [facility] Robson Downs APRN    Office Visit                    Losartan Potassium-HCTZ 100-12.5 MG Oral Tab 90 tablet 4     Sig: Take 1 tablet by mouth daily.         Hypertensive Medications Protocol Failed - 10/3/2022  9:51 AM        Failed - EGFRCR or GFRNAA > 50     GFR Evaluation  EGFRCR: 46 , resulted on 9/28/2022            Passed - In person appointment in the past 12 or next 3 months       Recent Outpatient Visits              1 week ago Essential hypertension    [address] Sara Rojas MD    Office Visit    1 year ago Hyperlipidemia, unspecified hyperlipidemia type    [address] Sara Rojas MD    Office Visit    1 year ago Pain in left wrist    [address], Ryan Johnson, [address] [place] [address] Visit    1 year ago Closed traumatic nondisplaced fracture of distal end of right radius, initial encounter    [address], Nimesh Winslow MD    Office Visit    2 years ago Essential hypertension    [address] [facility], [facility], [address] [place] [address] Robson Downs APRN    Office Visit                 Passed - Last BP reading less than 140/90     BP Readings from Last 1 Encounters:  09/28/22 : 123/77                Passed - CMP or BMP in past 6 months     Recent Results (from the past 4392 hour(s))   COMP METABOLIC PANEL (14)    Collection Time: 09/28/22 10:17 AM   Result Value Ref Range    Glucose 109 (H) 70 - 99 mg/dL    Sodium 138 136 - 145 mmol/L    Potassium 4.7 3.5 - 5.1 mmol/L    Chloride 104 98 - 112 mmol/L    CO2 25.0 21.0 - 32.0 mmol/L    Anion Gap 9 0 - 18 mmol/L    BUN 29 (H) 7 - 18 mg/dL    Creatinine 1.30 (H) 0.55 - 1.02 mg/dL    BUN/CREA Ratio 22.3 (H) 10.0 - 20.0    Calcium, Total 9.5 8.5 - 10.1 mg/dL    Calculated Osmolality 292 275 - 295 mOsm/kg    eGFR-Cr 46 (L) >=60 mL/min/1.73m2    ALT 37 13 - 56 U/L    AST 29 15 - 37 U/L    Alkaline Phosphatase 79 50 - 130 U/L    Bilirubin, Total 0.8 0.1 - 2.0 mg/dL    Total Protein 7.9 6.4 - 8.2 g/dL    Albumin 4.2 3.4 - 5.0 g/dL    Globulin  3.7 2.8 - 4.4 g/dL    A/G Ratio 1.1 1.0 - 2.0    Patient Fasting for CMP? Yes      *Note: Due to a large number of results and/or encounters for the requested time period, some results have not been displayed. A complete set of results can be found in Results Review.                  Passed - In person appointment or virtual visit in the past 6 months       Recent Outpatient Visits              1 week ago Essential hypertension    Yasmin Duenas MD    Office Visit    1 year ago Hyperlipidemia, unspecified hyperlipidemia type    [address] Anastacio Naas, MD    Office Visit    1 year ago Pain in left wrist    Physical Therapy - Litzy Robe Downs, [facility]    Office Visit    1 year ago Closed traumatic nondisplaced fracture of distal end of right radius, initial encounter    [address], Gayle Dave MD    Office Visit    2 years ago Essential hypertension    [address], 1 [facility] Robson Downs, APRMOY    Office Visit                   Signed Prescriptions Disp Refills    rosuvastatin 5 MG Oral Tab 90 tablet 4     Sig: Take 1 tablet (5 mg total) by mouth nightly.         Cholesterol Medication Protocol Passed - 10/3/2022  9:51 AM        Passed - ALT in past 12 months        Passed - LDL in past 12 months        Passed - Last ALT < 80       Lab Results   Component Value Date    ALT 37 09/28/2022             Passed - Last LDL < 130     Lab Results   Component Value Date    LDL 60 09/28/2022               Passed - In person appointment or virtual visit in the past 12 mos or appointment in next 3 mos       Recent Outpatient Visits              1 week ago Essential hypertension    [address], Emily Wilson MD    Office Visit    1 year ago Hyperlipidemia, unspecified hyperlipidemia type    [address], Scot Ding MD    Office Visit    1 year ago Pain in left wrist    Physical [place] Robe Downs,     Office Visit    1 year ago Closed traumatic nondisplaced fracture of distal end of right radius, initial encounter    [address], Gayle Dave MD    Office Visit    2 years ago Essential hypertension    [address], 1 [facility] Robson Downs, APRMOY    Office Visit                   Refused Prescriptions Disp Refills    levothyroxine 125 MCG Oral Tab 90 tablet 4     Sig: Take 1 tablet (125 mcg total) by mouth before breakfast.        Thyroid Medication Protocol Failed - 10/3/2022  9:51 AM        Failed - Last TSH value is normal     Lab Results   Component Value Date TSH 0.137 (L) 09/28/2022    THYROIDFUNC 6.00 (H) 08/25/2015                 Passed - TSH in past 12 months        Passed - In person appointment or virtual visit in the past 12 mos or appointment in next 3 mos       Recent Outpatient Visits              1 week ago Essential hypertension    [address], Bautista Zhang MD    Office Visit    1 year ago Hyperlipidemia, unspecified hyperlipidemia type    [address], Deshawn Elliott, Chandu Saba MD    Office Visit    1 year ago Pain in left wrist    Physical [address], OT    Office Visit    1 year ago Closed traumatic nondisplaced fracture of distal end of right radius, initial encounter    [address] Ru[address] Dr, MD    Office Visit    2 years ago Essential hypertension    [address], 1 [facility] Hillary Downs, APR    Office Visit                      Recent Outpatient Visits              1 week ago Essential hypertension    [address], Bautista Zhang MD    Office Visit    1 year ago Hyperlipidemia, unspecified hyperlipidemia type    [address], Deshawn Elliott, Chandu Saba MD    Office Visit    1 year ago Pain in left wrist    [address]atie [address] Dr, OT    Office Visit    1 year ago Closed traumatic nondisplaced fracture of distal end of right radius, initial encounter    [address]os Ruiz Dr, MD    Office Visit    2 years ago Essential hypertension    [address], 1 [facility] Hillary Downs, [address] [place] [address]    Office Visit

## 2022-12-03 DIAGNOSIS — I10 ESSENTIAL HYPERTENSION: ICD-10-CM

## 2022-12-06 RX ORDER — AMLODIPINE BESYLATE 10 MG/1
10 TABLET ORAL DAILY
Qty: 90 TABLET | Refills: 4 | Status: SHIPPED | OUTPATIENT
Start: 2022-12-06

## 2022-12-06 RX ORDER — LOSARTAN POTASSIUM AND HYDROCHLOROTHIAZIDE 12.5; 1 MG/1; MG/1
1 TABLET ORAL DAILY
Qty: 90 TABLET | Refills: 4 | Status: SHIPPED | OUTPATIENT
Start: 2022-12-06

## 2022-12-06 RX ORDER — ROSUVASTATIN CALCIUM 5 MG/1
5 TABLET, COATED ORAL NIGHTLY
Qty: 90 TABLET | Refills: 4 | Status: SHIPPED | OUTPATIENT
Start: 2022-12-06

## 2022-12-06 RX ORDER — LEVOTHYROXINE SODIUM 112 UG/1
112 TABLET ORAL
Qty: 90 TABLET | Refills: 4 | Status: SHIPPED | OUTPATIENT
Start: 2022-12-06

## 2022-12-16 NOTE — PROGRESS NOTES
Singh Camacho - There was worsening of your kidney function. Please stop alcohol and increase water. Avoid ibuprofen, aspirin products. Also worsening of your cholesterol.  Will not start medications as it was normal in the past. -Dr. Franchesca Castillo 82

## 2022-12-21 ENCOUNTER — HOSPITAL ENCOUNTER (OUTPATIENT)
Age: 62
Discharge: ANOTHER HOSPITAL | End: 2022-12-21
Payer: COMMERCIAL

## 2022-12-21 ENCOUNTER — APPOINTMENT (OUTPATIENT)
Dept: CT IMAGING | Age: 62
End: 2022-12-21
Attending: NURSE PRACTITIONER
Payer: COMMERCIAL

## 2022-12-21 ENCOUNTER — HOSPITAL ENCOUNTER (EMERGENCY)
Facility: HOSPITAL | Age: 62
Discharge: HOME | End: 2022-12-21
Attending: EMERGENCY MEDICINE
Payer: COMMERCIAL

## 2022-12-21 VITALS
WEIGHT: 170 LBS | DIASTOLIC BLOOD PRESSURE: 84 MMHG | HEART RATE: 95 BPM | HEIGHT: 71 IN | SYSTOLIC BLOOD PRESSURE: 144 MMHG | OXYGEN SATURATION: 98 % | RESPIRATION RATE: 18 BRPM | TEMPERATURE: 98 F | BODY MASS INDEX: 23.8 KG/M2

## 2022-12-21 VITALS
BODY MASS INDEX: 23.8 KG/M2 | SYSTOLIC BLOOD PRESSURE: 150 MMHG | DIASTOLIC BLOOD PRESSURE: 86 MMHG | HEIGHT: 71 IN | OXYGEN SATURATION: 98 % | HEART RATE: 95 BPM | WEIGHT: 170 LBS | RESPIRATION RATE: 18 BRPM | TEMPERATURE: 98 F

## 2022-12-21 DIAGNOSIS — R10.32 ABDOMINAL PAIN, LEFT LOWER QUADRANT: Primary | ICD-10-CM

## 2022-12-21 DIAGNOSIS — R10.9 ABDOMINAL PAIN, ACUTE: Primary | ICD-10-CM

## 2022-12-21 LAB
#MXD IC: 0.9 X10ˆ3/UL (ref 0.1–1)
ALBUMIN SERPL-MCNC: 3.7 G/DL (ref 3.4–5)
ALP LIVER SERPL-CCNC: 66 U/L
ALT SERPL-CCNC: 39 U/L
AST SERPL-CCNC: 31 U/L (ref 15–37)
BILIRUB DIRECT SERPL-MCNC: 0.2 MG/DL (ref 0–0.2)
BILIRUB SERPL-MCNC: 0.7 MG/DL (ref 0.1–2)
BILIRUB UR QL STRIP: NEGATIVE
BUN BLD-MCNC: 9 MG/DL (ref 7–18)
CHLORIDE BLD-SCNC: 100 MMOL/L (ref 98–112)
CO2 BLD-SCNC: 28 MMOL/L (ref 21–32)
COLOR UR: YELLOW
CREAT BLD-MCNC: 0.8 MG/DL
GFR SERPLBLD BASED ON 1.73 SQ M-ARVRAT: 83 ML/MIN/1.73M2 (ref 60–?)
GLUCOSE BLD-MCNC: 135 MG/DL (ref 70–99)
GLUCOSE UR STRIP-MCNC: NEGATIVE MG/DL
HCT VFR BLD AUTO: 40.4 %
HCT VFR BLD CALC: 43 %
HGB BLD-MCNC: 13.5 G/DL
HGB UR QL STRIP: NEGATIVE
ISTAT IONIZED CALCIUM FOR CHEM 8: 1.22 MMOL/L (ref 1.12–1.32)
LEUKOCYTE ESTERASE UR QL STRIP: NEGATIVE
LIPASE SERPL-CCNC: 108 U/L (ref 73–393)
LYMPHOCYTES # BLD AUTO: 0.9 X10ˆ3/UL (ref 1–4)
LYMPHOCYTES NFR BLD AUTO: 13 %
MCH RBC QN AUTO: 30.8 PG (ref 26–34)
MCHC RBC AUTO-ENTMCNC: 33.4 G/DL (ref 31–37)
MCV RBC AUTO: 92.2 FL (ref 80–100)
MIXED CELL %: 13.5 %
NEUTROPHILS # BLD AUTO: 5.2 X10ˆ3/UL (ref 1.5–7.7)
NEUTROPHILS NFR BLD AUTO: 73.5 %
NITRITE UR QL STRIP: NEGATIVE
PH UR STRIP: 6 [PH]
PLATELET # BLD AUTO: 293 X10ˆ3/UL (ref 150–450)
POTASSIUM BLD-SCNC: 3.7 MMOL/L (ref 3.6–5.1)
PROT SERPL-MCNC: 7.2 G/DL (ref 6.4–8.2)
PROT UR STRIP-MCNC: 100 MG/DL
RBC # BLD AUTO: 4.38 X10ˆ6/UL
SODIUM BLD-SCNC: 138 MMOL/L (ref 136–145)
SP GR UR STRIP: 1.02
UROBILINOGEN UR STRIP-ACNC: <2 MG/DL
WBC # BLD AUTO: 7 X10ˆ3/UL (ref 4–11)

## 2022-12-21 PROCEDURE — 80047 BASIC METABLC PNL IONIZED CA: CPT | Performed by: NURSE PRACTITIONER

## 2022-12-21 PROCEDURE — 81002 URINALYSIS NONAUTO W/O SCOPE: CPT | Performed by: NURSE PRACTITIONER

## 2022-12-21 PROCEDURE — 99283 EMERGENCY DEPT VISIT LOW MDM: CPT

## 2022-12-21 PROCEDURE — 36415 COLL VENOUS BLD VENIPUNCTURE: CPT

## 2022-12-21 PROCEDURE — 80076 HEPATIC FUNCTION PANEL: CPT | Performed by: EMERGENCY MEDICINE

## 2022-12-21 PROCEDURE — 85025 COMPLETE CBC W/AUTO DIFF WBC: CPT | Performed by: NURSE PRACTITIONER

## 2022-12-21 PROCEDURE — 83690 ASSAY OF LIPASE: CPT

## 2022-12-21 PROCEDURE — 83690 ASSAY OF LIPASE: CPT | Performed by: EMERGENCY MEDICINE

## 2022-12-21 PROCEDURE — 74177 CT ABD & PELVIS W/CONTRAST: CPT | Performed by: NURSE PRACTITIONER

## 2022-12-21 PROCEDURE — 80076 HEPATIC FUNCTION PANEL: CPT

## 2022-12-21 PROCEDURE — 99214 OFFICE O/P EST MOD 30 MIN: CPT | Performed by: NURSE PRACTITIONER

## 2022-12-21 NOTE — ED INITIAL ASSESSMENT (HPI)
Pt presents from [address] [place] [address] for abdominal pain (pt was experiencing on/off left mid abdominal pain). CTAP completed at  noted concerns for pancreatitis.

## 2022-12-21 NOTE — ED QUICK NOTES
Patient left the IC in stable condition via PV to [place] [address] for CT with  with saline lock in place. Instructed patient to go straight to facility and avoid drinking/eating. She verbalized understanding of instructions given. Detail Level: Zone Plan: Sunscreen SPF 30 or greater and reapply q3h when outdoors.\\nHats, protective clothing and seek shade when possible Plan: Monitor for recurrences and note any changes in footwear, creams, or going barefoot\\nCan use tacrolimus bid prn flares\\nConsider patch testing

## 2022-12-21 NOTE — DISCHARGE INSTRUCTIONS
Please return to the emergency department for any worsening symptoms including but not limited to fevers of 100.4, worsening abdominal pain, decreased desire to eat, weakness, numbness, losing stool/urine on yourself, blood in vomit/stool, chest pain, etc. Please follow with your primary care provider in the next few days. Please follow with your primary care provider regarding your CT scan findings    The Emergency Department is not intended to be a substitute for an effort to provide complete medical care. The imaging, if any, have often been interpreted on a preliminary basis pending final reading by the radiologist. If your blood pressure was greater than 140/90, please have this blood pressure rechecked by your primary care provider jennifer the next few days. You will benefit from a further discussion of lifestyle modifications that include Weight Reduction - Dietary Sodium Restriction - Increased Physical Activity and Moderation in alcohol (ETOH) Consumption. If possible check your pressure at home and keep a blood pressure log to bring to your physician. Results for orders placed or performed during the hospital encounter of 12/21/22   Hepatic Function Panel (7)    Collection Time: 12/21/22  1:04 PM   Result Value Ref Range    AST 31 15 - 37 U/L    ALT 39 13 - 56 U/L    Alkaline Phosphatase 66 50 - 130 U/L    Bilirubin, Total 0.7 0.1 - 2.0 mg/dL    Bilirubin, Direct 0.2 0.0 - 0.2 mg/dL    Total Protein 7.2 6.4 - 8.2 g/dL    Albumin 3.7 3.4 - 5.0 g/dL   Lipase    Collection Time: 12/21/22  1:04 PM   Result Value Ref Range    Lipase 108 73 - 393 U/L       CT ABDOMEN+PELVIS(CONTRAST ONLY)(CPT=74177)    Result Date: 12/21/2022  CONCLUSION:  1. Mild nonspecific stranding is seen adjacent to the pancreatic tail. This could reflect acute interstitial edematous pancreatitis in the appropriate clinical setting; correlation with clinical and laboratory parameters is suggested.  There is no loculated acute peripancreatic fluid collection. 2. Increasing probable mesenteric lymph nodes in the left upper quadrant. Continued follow-up should be considered. 3. Uncomplicated distal colonic diverticulosis. 4. Hepatic steatosis. 5. Bilateral hip arthroplasties with resultant artifactual degradation of the pelvis. 6. Chronic compression fracture deformities of L1-L2.  7. Lesser incidental findings as above.     Dictated by (CST): Esequiel Suazo MD on 12/21/2022 at 12:11 PM     Finalized by (CST): Esequiel Suazo MD on 12/21/2022 at 12:19 PM

## 2022-12-21 NOTE — ED INITIAL ASSESSMENT (HPI)
Pt reports worsening right sided abdominal pain beginning about 4 days ago. +nausea, no vomiting. Denies diarrhea.

## 2023-05-18 DIAGNOSIS — I10 ESSENTIAL HYPERTENSION: ICD-10-CM

## 2023-05-19 RX ORDER — ROSUVASTATIN CALCIUM 5 MG/1
5 TABLET, COATED ORAL NIGHTLY
Qty: 90 TABLET | Refills: 3 | Status: SHIPPED | OUTPATIENT
Start: 2023-05-19

## 2023-05-19 NOTE — TELEPHONE ENCOUNTER
Refill passed per [place] [employer], [facility] protocol. Requested Prescriptions   Pending Prescriptions Disp Refills    amLODIPine 10 MG Oral Tab 90 tablet 3     Sig: Take 1 tablet (10 mg total) by mouth daily. Hypertensive Medications Protocol Failed - 5/18/2023  3:47 PM        Failed - Last BP reading less than 140/90     BP Readings from Last 1 Encounters:  12/21/22 : 150/86                Failed - CMP or BMP in past 6 months     No results found for this or any previous visit (from the past 4392 hour(s)).               Failed - In person appointment or virtual visit in the past 6 months     Recent Outpatient Visits              7 months ago Essential hypertension    [facility]    Office Visit    1 year ago Hyperlipidemia, unspecified hyperlipidemia type    [address] [place], [place] [address]Isabell Lita Adolf, MD    Office Visit    2 years ago Pain in left wrist    Physical [address], Anay Gallegos, OT    Office Visit    2 years ago Closed traumatic nondisplaced fracture of distal end of right radius, initial encounter    [address], Luis F Gudino MD    Office Visit    2 years ago Essential hypertension    [address] [facility],[address], [facility], [address] [place] [address], [facility]    Office Visit                      Passed - In person appointment in the past 12 or next 3 months     Recent Outpatient Visits              7 months ago Essential hypertension    [facility]    Office Visit    1 year ago Hyperlipidemia, unspecified hyperlipidemia type    [address] [place], [place] [address]Isabell Lita Adolf, MD    Office Visit    2 years ago Pain in left wrist    [address] Dr, Anay Gallegos, OT    Office Visit    2 years ago Closed traumatic nondisplaced fracture of distal end of right radius, initial encounter    [address], Lavern George MD    Office Visit    2 years ago Essential hypertension    [address], [address], [address] [facility], [facility]    Office Visit                      Passed - [facility] or GFRNAA > 50     GFR Evaluation  EGFRCR: 83 , resulted on 12/21/2022              Losartan Potassium-HCTZ 100-12.5 MG Oral Tab 90 tablet 3     Sig: Take 1 tablet by mouth daily. Hypertensive Medications Protocol Failed - 5/18/2023  3:47 PM        Failed - Last BP reading less than 140/90     BP Readings from Last 1 Encounters:  12/21/22 : 150/86                Failed - CMP or BMP in past 6 months     No results found for this or any previous visit (from the past 4392 hour(s)).               Failed - In person appointment or virtual visit in the past 6 months     Recent Outpatient Visits              7 months ago Essential hypertension    [address], Sirena Ruvalcaba MD    Office Visit    1 year ago Hyperlipidemia, unspecified hyperlipidemia type    [address], Sirena Ruvalcaba MD    Office Visit    2 years ago Pain in left wrist    Physical [address], Barbara Farris, OT    Office Visit    2 years ago Closed traumatic nondisplaced fracture of distal end of right radius, initial encounter    [address], Lavern George MD    Office Visit    2 years ago Essential hypertension    [address], [address], [address] [place] - In person appointment in the past 12 or next 3 months     Recent Outpatient Visits              7 months ago Essential hypertension    [address], Rayna Ba MD    Office Visit    1 year ago Hyperlipidemia, unspecified hyperlipidemia type    [facility] Tamara Lou, Rashmi Solorzano MD    Office Visit    2 years ago Pain in left wrist    Physical [address], Josh Rodney, OT    Office Visit    2 years ago Closed traumatic nondisplaced fracture of distal end of right radius, initial encounter    [address], Christiano Beaulieu MD    Office Visit    2 years ago Essential hypertension     [address], [address] [facility], APRN    Office Visit                      Passed - [facility] or [facility] > 50     GFR Evaluation  EGFRCR: 83 , resulted on 12/21/2022              rosuvastatin 5 MG Oral Tab 90 tablet 1     Sig: Take 1 tablet (5 mg total) by mouth nightly.        Cholesterol Medication Protocol Passed - 5/18/2023  3:47 PM        Passed - ALT in past 12 months        Passed - LDL in past 12 months        Passed - Last ALT < 80     Lab Results   Component Value Date    ALT 39 12/21/2022             Passed - Last LDL < 130     Lab Results   Component Value Date    LDL 60 09/28/2022               Passed - In person appointment or virtual visit in the past 12 mos or appointment in next 3 mos     Recent Outpatient Visits              7 months ago Essential hypertension    [place], Lisandra Hoffman MD    Office Visit    1 year ago Hyperlipidemia, unspecified hyperlipidemia type    Lisandra Munson MD    Office Visit    2 years ago Pain in left wrist    [address], Josh Rodney, OT    Office Visit    2 years ago Closed traumatic nondisplaced fracture of distal end of right radius, initial encounter    [address], Christiano Beaulieu MD    Office Visit    2 years ago Essential hypertension    [place], [address], [address] [facility], APRN    Office Visit                        levothyroxine 112 MCG Oral Tab 90 tablet 3     Sig: Take 1 tablet (112 mcg total) by mouth before breakfast.       Thyroid Medication Protocol Failed - 5/18/2023  3:47 PM        Failed - Last TSH value is normal     Lab Results   Component Value Date    TSH 0.137 (L) 09/28/2022    THYROIDFUNC 6.00 (H) 08/25/2015                 Passed - TSH in past 12 months        Passed - In person appointment or virtual visit in the past 12 mos or appointment in next 3 mos     Recent Outpatient Visits              7 months ago Essential hypertension    Christal Hernandez, Sara Monzon MD    Office Visit    1 year ago Hyperlipidemia, unspecified hyperlipidemia type    [facility], [facility], Sara Monzon MD    Office Visit    2 years ago Pain in left wrist    Physical [address], Ryan Johnson, [facility] Visit    2 years ago Closed traumatic nondisplaced fracture of distal end of right radius, initial encounter    [address], Austine Babinski, MD    Office Visit    2 years ago Essential hypertension    Delphine Correa, [place] [address], 1 [facility] Robson Downs, [address] [place] [address]    Office Visit                            Recent Outpatient Visits              7 months ago Essential hypertension    Christal Hernandez, Christiano Grover MD    Office Visit    1 year ago Hyperlipidemia, unspecified hyperlipidemia type    Christal Hernandez, Christiano Grover MD    Office Visit    2 years ago Pain in left wrist    [address], Ryan Johnson, [address] [place] Visit    2 years ago Closed traumatic nondisplaced fracture of distal end of right radius, initial encounter    [address], Austine Babinski, MD    Office Visit    2 years ago Essential hypertension    [address] [facility], [facility], [facility] Robson Downs, [facility]    Office Visit

## 2023-05-19 NOTE — TELEPHONE ENCOUNTER
Please review. Protocol failed / No protocol. Requested Prescriptions   Pending Prescriptions Disp Refills    amLODIPine 10 MG Oral Tab 90 tablet 3     Sig: Take 1 tablet (10 mg total) by mouth daily. Hypertensive Medications Protocol Failed - 5/18/2023  3:47 PM        Failed - Last BP reading less than 140/90     BP Readings from Last 1 Encounters:  12/21/22 : 150/86                Failed - CMP or BMP in past 6 months     No results found for this or any previous visit (from the past 4392 hour(s)).               Failed - In person appointment or virtual visit in the past 6 months     Recent Outpatient Visits              7 months ago Essential hypertension    Taj Arcos MD    Office Visit    1 year ago Hyperlipidemia, unspecified hyperlipidemia type    Nimo Parker [facility], MD    Office Visit    2 years ago Pain in left wrist    Physical Orvis Mendez Downs, Rhett Fernandez, OT    Office Visit    2 years ago Closed traumatic nondisplaced fracture of distal end of right radius, initial encounter    [address], Artur Calixto MD    Office Visit    2 years ago Essential hypertension    [address] [facility],[address], [address] , [address] [place] [address]    Office Visit                      Passed - In person appointment in the past 12 or next 3 months     Recent Outpatient Visits              7 months ago Essential hypertension    Taj Arcos MD    Office Visit    1 year ago Hyperlipidemia, unspecified hyperlipidemia type    Nimo Parker [facility], MD    Office Visit    2 years ago Pain in left wrist    [address], Rhett Fernandez, OT    Office Visit    2 years ago Closed traumatic nondisplaced fracture of distal end of right radius, initial encounter    [address], Dereje Mirza MD    Office Visit    2 years ago Essential hypertension    [address], [address], [address] [facility], APR    Office Visit                      Passed - [facility] or GFRNAA > 50     GFR Evaluation  EGFRCR: 83 , resulted on 12/21/2022              Losartan Potassium-HCTZ 100-12.5 MG Oral Tab 90 tablet 3     Sig: Take 1 tablet by mouth daily. Hypertensive Medications Protocol Failed - 5/18/2023  3:47 PM        Failed - Last BP reading less than 140/90     BP Readings from Last 1 Encounters:  12/21/22 : 150/86                Failed - CMP or BMP in past 6 months     No results found for this or any previous visit (from the past 4392 hour(s)).               Failed - In person appointment or virtual visit in the past 6 months     Recent Outpatient Visits              7 months ago Essential hypertension    [address], Silvia Sanchez MD    Office Visit    1 year ago Hyperlipidemia, unspecified hyperlipidemia type    [address] [facility], Quang Amin MD    Office Visit    2 years ago Pain in left wrist    Physical [address], Krishan Mcdowell, OT    Office Visit    2 years ago Closed traumatic nondisplaced fracture of distal end of right radius, initial encounter    [address], Dereje Mirza MD    Office Visit    2 years ago Essential hypertension    [address], [address], [address] [facility], [address] [place] [address] [place] - In person appointment in the past 12 or next 3 months     Recent Outpatient Visits              7 months ago Essential hypertension    [address] [facility], Quang Amin MD    Office Visit    1 year ago Hyperlipidemia, unspecified hyperlipidemia type    [facility] Radha Mccloud, Vasu Tillman, Deng Wells MD    Office Visit    2 years ago Pain in left wrist    Physical [address], Alvarez Melissa, OT    Office Visit    2 years ago Closed traumatic nondisplaced fracture of distal end of right radius, initial encounter    [address]ir Stone Dr, Walker Vasquez MD    Office Visit    2 years ago Essential hypertension    [address], [address], [address] [facility]    Office Visit                      Passed - EGFRCR or GFRNAA > 50     GFR Evaluation  EGFRCR: 83 , resulted on 12/21/2022              levothyroxine 112 MCG Oral Tab 90 tablet 3     Sig: Take 1 tablet (112 mcg total) by mouth before breakfast.       Thyroid Medication Protocol Failed - 5/18/2023  3:47 PM        Failed - Last TSH value is normal     Lab Results   Component Value Date    TSH 0.137 (L) 09/28/2022    THYROIDFUNC 6.00 (H) 08/25/2015                 Passed - TSH in past 12 months        Passed - In person appointment or virtual visit in the past 12 mos or appointment in next 3 mos     Recent Outpatient Visits              7 months ago Essential hypertension    [address], Heath Baird MD    Office Visit    1 year ago Hyperlipidemia, unspecified hyperlipidemia type    Delma Knowles, Heath Baird MD    Office Visit    2 years ago Pain in left wrist    Johan Ohm , Alvarez Melissa, [address] [facility] Visit    2 years ago Closed traumatic nondisplaced fracture of distal end of right radius, initial encounter    [address], Walker Vasquez MD    Office Visit    2 years ago Essential hypertension    [address] [facility] [address] [facility], APRN    Office Visit                       Signed Prescriptions Disp Refills    rosuvastatin 5 MG Oral Tab 90 tablet 3     Sig: Take 1 tablet (5 mg total) by mouth nightly.        Cholesterol Medication Protocol Passed - 5/18/2023  3:47 PM        Passed - ALT in past 12 months        Passed - LDL in past 12 months        Passed - Last ALT < 80     Lab Results   Component Value Date    ALT 39 12/21/2022             Passed - Last LDL < 130     Lab Results   Component Value Date    LDL 60 09/28/2022               Passed - In person appointment or virtual visit in the past 12 mos or appointment in next 3 mos     Recent Outpatient Visits              7 months ago Essential hypertension    [address] [facility], Jeremias Hollis MD    Office Visit    1 year ago Hyperlipidemia, unspecified hyperlipidemia type    Jeremias Rey MD    Office Visit    2 years ago Pain in left wrist    [address], Shalonda Cook,     Office Visit    2 years ago Closed traumatic nondisplaced fracture of distal end of right radius, initial encounter    [address], Razia Garcia MD    Office Visit    2 years ago Essential hypertension    [address], [facility] [address] [address] [facility]    Office Visit                            Recent Outpatient Visits              7 months ago Essential hypertension    Jeremias Rey MD    Office Visit    1 year ago Hyperlipidemia, unspecified hyperlipidemia type    Jeremias Rey MD    Office Visit    2 years ago Pain in left wrist    [address]ley [address] Dr, Shalonda Cook, [facility] Visit    2 years ago Closed traumatic nondisplaced fracture of distal end of right radius, initial encounter    [address], Razia Garcia MD    Office Visit    2 years ago Essential hypertension    [facility], [address], [address] [place] [address] Chandrakant Downs, APRN    Office Visit

## 2023-05-20 RX ORDER — LEVOTHYROXINE SODIUM 112 UG/1
112 TABLET ORAL
Qty: 90 TABLET | Refills: 3 | Status: SHIPPED | OUTPATIENT
Start: 2023-05-20

## 2023-05-20 RX ORDER — AMLODIPINE BESYLATE 10 MG/1
10 TABLET ORAL DAILY
Qty: 90 TABLET | Refills: 3 | Status: SHIPPED | OUTPATIENT
Start: 2023-05-20

## 2023-05-20 RX ORDER — LOSARTAN POTASSIUM AND HYDROCHLOROTHIAZIDE 12.5; 1 MG/1; MG/1
1 TABLET ORAL DAILY
Qty: 90 TABLET | Refills: 3 | Status: SHIPPED | OUTPATIENT
Start: 2023-05-20

## 2023-06-14 ENCOUNTER — TELEPHONE (OUTPATIENT)
Dept: FAMILY MEDICINE CLINIC | Facility: CLINIC | Age: 63
End: 2023-06-14

## 2023-09-13 DIAGNOSIS — I10 ESSENTIAL HYPERTENSION: ICD-10-CM

## 2023-09-14 RX ORDER — ROSUVASTATIN CALCIUM 5 MG/1
5 TABLET, COATED ORAL NIGHTLY
Qty: 90 TABLET | Refills: 3 | Status: SHIPPED | OUTPATIENT
Start: 2023-09-14

## 2023-09-14 RX ORDER — LOSARTAN POTASSIUM AND HYDROCHLOROTHIAZIDE 12.5; 1 MG/1; MG/1
1 TABLET ORAL DAILY
Qty: 90 TABLET | Refills: 3 | Status: SHIPPED | OUTPATIENT
Start: 2023-09-14

## 2023-09-14 RX ORDER — LEVOTHYROXINE SODIUM 112 UG/1
112 TABLET ORAL
Qty: 90 TABLET | Refills: 3 | Status: SHIPPED | OUTPATIENT
Start: 2023-09-14

## 2023-09-14 RX ORDER — AMLODIPINE BESYLATE 10 MG/1
10 TABLET ORAL DAILY
Qty: 90 TABLET | Refills: 3 | Status: SHIPPED | OUTPATIENT
Start: 2023-09-14

## 2023-10-02 ENCOUNTER — LAB ENCOUNTER (OUTPATIENT)
Dept: LAB | Age: 63
End: 2023-10-02
Attending: FAMILY MEDICINE
Payer: COMMERCIAL

## 2023-10-02 DIAGNOSIS — E55.9 VITAMIN D DEFICIENCY: ICD-10-CM

## 2023-10-02 DIAGNOSIS — Z00.00 WELL ADULT EXAM: ICD-10-CM

## 2023-10-02 LAB
ALBUMIN SERPL-MCNC: 4.3 G/DL (ref 3.4–5)
ALBUMIN/GLOB SERPL: 1.1 {RATIO} (ref 1–2)
ALP LIVER SERPL-CCNC: 73 U/L
ALT SERPL-CCNC: 53 U/L
ANION GAP SERPL CALC-SCNC: 8 MMOL/L (ref 0–18)
AST SERPL-CCNC: 48 U/L (ref 15–37)
BASOPHILS # BLD AUTO: 0.07 X10(3) UL (ref 0–0.2)
BASOPHILS NFR BLD AUTO: 1.1 %
BILIRUB SERPL-MCNC: 0.5 MG/DL (ref 0.1–2)
BUN BLD-MCNC: 29 MG/DL (ref 7–18)
CALCIUM BLD-MCNC: 9.8 MG/DL (ref 8.5–10.1)
CHLORIDE SERPL-SCNC: 104 MMOL/L (ref 98–112)
CHOLEST SERPL-MCNC: 202 MG/DL (ref ?–200)
CO2 SERPL-SCNC: 22 MMOL/L (ref 21–32)
CREAT BLD-MCNC: 1.5 MG/DL
EGFRCR SERPLBLD CKD-EPI 2021: 39 ML/MIN/1.73M2 (ref 60–?)
EOSINOPHIL # BLD AUTO: 0.06 X10(3) UL (ref 0–0.7)
EOSINOPHIL NFR BLD AUTO: 1 %
ERYTHROCYTE [DISTWIDTH] IN BLOOD BY AUTOMATED COUNT: 12.8 %
FASTING PATIENT LIPID ANSWER: YES
FASTING STATUS PATIENT QL REPORTED: YES
GLOBULIN PLAS-MCNC: 3.9 G/DL (ref 2.8–4.4)
GLUCOSE BLD-MCNC: 110 MG/DL (ref 70–99)
HCT VFR BLD AUTO: 37.2 %
HDLC SERPL-MCNC: 131 MG/DL (ref 40–59)
HGB BLD-MCNC: 12.7 G/DL
IMM GRANULOCYTES # BLD AUTO: 0.04 X10(3) UL (ref 0–1)
IMM GRANULOCYTES NFR BLD: 0.6 %
LDLC SERPL CALC-MCNC: 58 MG/DL (ref ?–100)
LYMPHOCYTES # BLD AUTO: 0.8 X10(3) UL (ref 1–4)
LYMPHOCYTES NFR BLD AUTO: 13 %
MCH RBC QN AUTO: 31.9 PG (ref 26–34)
MCHC RBC AUTO-ENTMCNC: 34.1 G/DL (ref 31–37)
MCV RBC AUTO: 93.5 FL
MONOCYTES # BLD AUTO: 0.7 X10(3) UL (ref 0.1–1)
MONOCYTES NFR BLD AUTO: 11.3 %
NEUTROPHILS # BLD AUTO: 4.5 X10 (3) UL (ref 1.5–7.7)
NEUTROPHILS # BLD AUTO: 4.5 X10(3) UL (ref 1.5–7.7)
NEUTROPHILS NFR BLD AUTO: 73 %
NONHDLC SERPL-MCNC: 71 MG/DL (ref ?–130)
OSMOLALITY SERPL CALC.SUM OF ELEC: 284 MOSM/KG (ref 275–295)
PLATELET # BLD AUTO: 287 10(3)UL (ref 150–450)
POTASSIUM SERPL-SCNC: 4.4 MMOL/L (ref 3.5–5.1)
PROT SERPL-MCNC: 8.2 G/DL (ref 6.4–8.2)
RBC # BLD AUTO: 3.98 X10(6)UL
SODIUM SERPL-SCNC: 134 MMOL/L (ref 136–145)
T3FREE SERPL-MCNC: 2.76 PG/ML (ref 2.4–4.2)
T4 FREE SERPL-MCNC: 1.1 NG/DL (ref 0.8–1.7)
TRIGL SERPL-MCNC: 74 MG/DL (ref 30–149)
TSI SER-ACNC: 0.29 MIU/ML (ref 0.36–3.74)
VIT B12 SERPL-MCNC: 497 PG/ML (ref 193–986)
VIT D+METAB SERPL-MCNC: 34.8 NG/ML (ref 30–100)
VLDLC SERPL CALC-MCNC: 11 MG/DL (ref 0–30)
WBC # BLD AUTO: 6.2 X10(3) UL (ref 4–11)

## 2023-10-02 PROCEDURE — 84443 ASSAY THYROID STIM HORMONE: CPT

## 2023-10-02 PROCEDURE — 82306 VITAMIN D 25 HYDROXY: CPT

## 2023-10-02 PROCEDURE — 82607 VITAMIN B-12: CPT

## 2023-10-02 PROCEDURE — 80053 COMPREHEN METABOLIC PANEL: CPT

## 2023-10-02 PROCEDURE — 85025 COMPLETE CBC W/AUTO DIFF WBC: CPT

## 2023-10-02 PROCEDURE — 80061 LIPID PANEL: CPT

## 2023-10-02 PROCEDURE — 36415 COLL VENOUS BLD VENIPUNCTURE: CPT

## 2023-10-02 PROCEDURE — 84439 ASSAY OF FREE THYROXINE: CPT

## 2023-10-02 PROCEDURE — 84481 FREE ASSAY (FT-3): CPT

## 2023-10-04 DIAGNOSIS — I10 ESSENTIAL HYPERTENSION: ICD-10-CM

## 2023-10-06 RX ORDER — LEVOTHYROXINE SODIUM 112 UG/1
112 TABLET ORAL
Qty: 90 TABLET | Refills: 3 | Status: CANCELLED | OUTPATIENT
Start: 2023-10-06

## 2023-10-06 RX ORDER — AMLODIPINE BESYLATE 10 MG/1
10 TABLET ORAL DAILY
Qty: 90 TABLET | Refills: 3 | Status: CANCELLED | OUTPATIENT
Start: 2023-10-06

## 2023-10-06 RX ORDER — ROSUVASTATIN CALCIUM 5 MG/1
5 TABLET, COATED ORAL NIGHTLY
Qty: 90 TABLET | Refills: 3 | Status: SHIPPED | OUTPATIENT
Start: 2023-10-06

## 2023-10-06 RX ORDER — LOSARTAN POTASSIUM AND HYDROCHLOROTHIAZIDE 12.5; 1 MG/1; MG/1
1 TABLET ORAL DAILY
Qty: 90 TABLET | Refills: 3 | Status: CANCELLED | OUTPATIENT
Start: 2023-10-06

## 2023-10-06 NOTE — TELEPHONE ENCOUNTER
Refill passed per [facility] protocol.    Requested Prescriptions   Pending Prescriptions Disp Refills    amLODIPine 10 MG Oral Tab 90 tablet 3     Sig: Take 1 tablet (10 mg total) by mouth daily.       Hypertensive Medications Protocol Failed - 10/4/2023  3:07 PM        Failed - EGFRCR or GFRNAA > 50     GFR Evaluation  EGFRCR: 39 , resulted on 10/2/2023          Passed - In person appointment in the past 12 or next 3 months     Recent Outpatient Visits              4 days ago [facility]    [facility]    1 year ago [facility]ential hypertension    [facility]    Office Visit    2 years ago Hyperlipidemia, unspecified hyperlipidemia type    [facility]    Office Visit    2 years ago Pain in left wrist    Physical Therapy - Litzy Downs, Chandler Bennett OT    Office Visit    2 years ago Closed traumatic nondisplaced fracture of distal end of right radius, initial encounter    Orthopaedics - Litzy Downs, Concetta Alegre MD    Office Visit                      Passed - Last BP reading less than 140/90     BP Readings from Last 1 Encounters:  10/02/23 : 128/77              Passed - CMP or BMP in past 6 months     Recent Results (from the past 4392 hour(s))   Comp Metabolic Panel (14)    Collection Time: 10/02/23 10:12 AM   Result Value Ref Range    Glucose 110 (H) 70 - 99 mg/dL    Sodium 134 (L) 136 - 145 mmol/L    Potassium 4.4 3.5 - 5.1 mmol/L    Chloride 104 98 - 112 mmol/L    CO2 22.0 21.0 - 32.0 mmol/L    Anion Gap 8 0 - 18 mmol/L    BUN 29 (H) 7 - 18 mg/dL    Creatinine 1.50 (H) 0.55 - 1.02 mg/dL    Calcium, Total 9.8 8.5 - 10.1 mg/dL    Calculated Osmolality 284 275 - 295 mOsm/kg    eGFR-Cr 39 (L) >=60 mL/min/1.73m2    AST 48 (H) 15 - 37 U/L    ALT 53 13 - 56 U/L    Alkaline Phosphatase 73  50 - 130 U/L    Bilirubin, Total 0.5 0.1 - 2.0 mg/dL    Total Protein 8.2 6.4 - 8.2 g/dL    Albumin 4.3 3.4 - 5.0 g/dL    Globulin  3.9 2.8 - 4.4 g/dL    A/G Ratio 1.1 1.0 - 2.0    Patient Fasting for CMP? Yes      *Note: Due to a large number of results and/or encounters for the requested time period, some results have not been displayed. A complete set of results can be found in Results Review.               Passed - In person appointment or virtual visit in the past 6 months     Recent Outpatient Visits              4 days ago [facility] abuse    [facility]    Office Visit    1 year ago Essential hypertension    [facility]    Office Visit    2 years ago Hyperlipidemia, unspecified hyperlipidemia type    [facility]    Office Visit    2 years ago Pain in left wrist    Physical Therapy - Litzy Downs, Chandler Bennett OT    Office Visit    2 years ago Closed traumatic nondisplaced fracture of distal end of right radius, initial encounter    Orthopaedics - Litzy Downs, Concetta Alegre MD    Office Visit                        Losartan Potassium-HCTZ 100-12.5 MG Oral Tab 90 tablet 3     Sig: Take 1 tablet by mouth daily.       Hypertensive Medications Protocol Failed - 10/4/2023  3:07 PM        Failed - EGFRCR or GFRNAA > 50     GFR Evaluation  EGFRCR: 39 , resulted on 10/2/2023          Passed - In person appointment in the past 12 or next 3 months     Recent Outpatient Visits              4 days ago Alcohol abuse    [facility]    Office Visit    1 year ago Essential hypertension    [facility]    Office Visit    2 years ago Hyperlipidemia, unspecified hyperlipidemia type    [facility]x [facility]    Office Visit    2 years ago Pain in left wrist    Physical Therapy - Litzy Downs, Chandler Bennett OT    Office Visit    2 years ago Closed traumatic nondisplaced fracture of distal end of right radius, initial encounter    Orthopaedics - Litzy Downs, Concetta Alegre MD    Office Visit                      Passed - Last BP reading less than 140/90     BP Readings from Last 1 Encounters:  10/02/23 : 128/77              Passed - CMP or BMP in past 6 months     Recent Results (from the past 4392 hour(s))   Comp Metabolic Panel (14)    Collection Time: 10/02/23 10:12 AM   Result Value Ref Range    Glucose 110 (H) 70 - 99 mg/dL    Sodium 134 (L) 136 - 145 mmol/L    Potassium 4.4 3.5 - 5.1 mmol/L    Chloride 104 98 - 112 mmol/L    CO2 22.0 21.0 - 32.0 mmol/L    Anion Gap 8 0 - 18 mmol/L    BUN 29 (H) 7 - 18 mg/dL    Creatinine 1.50 (H) 0.55 - 1.02 mg/dL    Calcium, Total 9.8 8.5 - 10.1 mg/dL    Calculated Osmolality 284 275 - 295 mOsm/kg    eGFR-Cr 39 (L) >=60 mL/min/1.73m2    AST 48 (H) 15 - 37 U/L    ALT 53 13 - 56 U/L    Alkaline Phosphatase 73 50 - 130 U/L    Bilirubin, Total 0.5 0.1 - 2.0 mg/dL    Total Protein 8.2 6.4 - 8.2 g/dL    Albumin 4.3 3.4 - 5.0 g/dL    Globulin  3.9 2.8 - 4.4 g/dL    A/G Ratio 1.1 1.0 - 2.0    Patient Fasting for CMP? Yes      *Note: Due to a large number of results and/or encounters for the requested time period, some results have not been displayed. A complete set of results can be found in Results Review.               Passed - In person appointment or virtual visit in the past 6 months     Recent Outpatient Visits              4 days ago [facility] abuse    [facility]    [facility] Visit    1 year ago Essential hypertension    [facility] Vasu Olivo, Alyx Land MD    Office Visit    2 years ago Hyperlipidemia,  unspecified hyperlipidemia type    [facility] Alyx Gomes MD    Office Visit    2 years ago Pain in left wrist    Physical Therapy - Litzy Downs, Chandler Bennett, OT    Office Visit    2 years ago Closed traumatic nondisplaced fracture of distal end of right radius, initial encounter    Orthopaedics - Litzy Downs, Concetta Alegre MD    Office Visit                        rosuvastatin 5 MG Oral Tab 90 tablet 3     Sig: Take 1 tablet (5 mg total) by mouth nightly.       Cholesterol Medication Protocol Passed - 10/4/2023  3:07 PM        Passed - ALT in past 12 months        Passed - LDL in past 12 months        Passed - Last ALT < 80     Lab Results   Component Value Date    ALT 53 10/02/2023             Passed - Last LDL < 130     Lab Results   Component Value Date    LDL 58 10/02/2023             Passed - In person appointment or virtual visit in the past 12 mos or appointment in next 3 mos     Recent Outpatient Visits              4 days ago Alcohol abuse    [facility] Alyx Gomes MD    Office Visit    1 year ago Essential hypertension    [facility] Alyx Gomes MD    Office Visit    2 years ago Hyperlipidemia, unspecified hyperlipidemia type    [facility] Alyx Gomes MD    Office Visit    2 years ago Pain in left wrist    Physical Therapy - Litzy Downs, Chandler Bennett, OT    Office Visit    2 years ago Closed traumatic nondisplaced fracture of distal end of right radius, initial encounter    Orthopaedics [address] Dr, Concetta Alegre MD    Office Visit                        levothyroxine 112 MCG Oral Tab 90 tablet 3     Sig: Take 1 tablet (112 mcg total) by mouth before breakfast.       Thyroid Medication Protocol Failed - 10/4/2023  3:07 PM        Failed - Last TSH value is  normal     Lab Results   Component Value Date    TSH 0.286 (L) 10/02/2023    THYROIDFUNC 6.00 (H) 08/25/2015                 Passed - TSH in past 12 months        Passed - In person appointment or virtual visit in the past 12 mos or appointment in next 3 mos     Recent Outpatient Visits              4 days ago Alcohol abuse    [facility] Alyx Gomes MD    Office Visit    1 year ago Essential hypertension    [facility] Alyx Gomes MD    Office Visit    2 years ago Hyperlipidemia, unspecified hyperlipidemia type    [facility]    Office Visit    2 years ago Pain in left wrist    Physical Therapy - Litzy Downs, Chandler Bennett, CHRISTOPHE    Office Visit    2 years ago Closed traumatic nondisplaced fracture of distal end of right radius, initial encounter    Orthopaedics [address], Concetta Alegre MD    Office Visit                           Recent Outpatient Visits              4 days ago Alcohol abuse    [facility] Alyx Gomes MD    Office Visit    1 year ago Essential hypertension    [facility] Alyx Gomes MD    Office Visit    2 years ago Hyperlipidemia, unspecified hyperlipidemia type    [facility] Alyx Gomes MD    Office Visit    2 years ago Pain in left wrist    Physical Therapy - Litzy Downs, Chandler Bennett, OT    Office Visit    2 years ago Closed traumatic nondisplaced fracture of distal end of right radius, initial encounter    Orthopaedics Nancy [address], Concetta Alegre MD    Office Visit

## 2023-10-10 DIAGNOSIS — I10 ESSENTIAL HYPERTENSION: ICD-10-CM

## 2023-10-10 DIAGNOSIS — E03.9 HYPOTHYROIDISM, UNSPECIFIED TYPE: Primary | ICD-10-CM

## 2023-12-12 DIAGNOSIS — I10 ESSENTIAL HYPERTENSION: ICD-10-CM

## 2023-12-14 RX ORDER — LOSARTAN POTASSIUM AND HYDROCHLOROTHIAZIDE 12.5; 1 MG/1; MG/1
1 TABLET ORAL DAILY
Qty: 90 TABLET | Refills: 3 | OUTPATIENT
Start: 2023-12-14

## 2023-12-14 RX ORDER — ROSUVASTATIN CALCIUM 5 MG/1
5 TABLET, COATED ORAL NIGHTLY
Qty: 90 TABLET | Refills: 3 | OUTPATIENT
Start: 2023-12-14

## 2023-12-14 RX ORDER — LEVOTHYROXINE SODIUM 112 UG/1
112 TABLET ORAL
Qty: 90 TABLET | Refills: 3 | OUTPATIENT
Start: 2023-12-14

## 2023-12-14 RX ORDER — AMLODIPINE BESYLATE 10 MG/1
10 TABLET ORAL DAILY
Qty: 90 TABLET | Refills: 3 | OUTPATIENT
Start: 2023-12-14

## 2024-01-09 ENCOUNTER — HOSPITAL ENCOUNTER (INPATIENT)
Facility: HOSPITAL | Age: 64
LOS: 1 days | Discharge: HOME | End: 2024-01-10
Attending: EMERGENCY MEDICINE | Admitting: HOSPITALIST
Payer: COMMERCIAL

## 2024-01-09 ENCOUNTER — APPOINTMENT (OUTPATIENT)
Dept: CT IMAGING | Facility: HOSPITAL | Age: 64
End: 2024-01-09
Attending: EMERGENCY MEDICINE
Payer: COMMERCIAL

## 2024-01-09 ENCOUNTER — HOSPITAL ENCOUNTER (OUTPATIENT)
Facility: HOSPITAL | Age: 64
Setting detail: OBSERVATION
Discharge: HOME | End: 2024-01-10
Attending: EMERGENCY MEDICINE | Admitting: HOSPITALIST
Payer: COMMERCIAL

## 2024-01-09 ENCOUNTER — APPOINTMENT (OUTPATIENT)
Dept: MRI IMAGING | Facility: HOSPITAL | Age: 64
End: 2024-01-09
Attending: EMERGENCY MEDICINE
Payer: COMMERCIAL

## 2024-01-09 ENCOUNTER — HOSPITAL ENCOUNTER (OUTPATIENT)
Age: 64
Discharge: ANOTHER HOSPITAL | End: 2024-01-09
Payer: COMMERCIAL

## 2024-01-09 VITALS
DIASTOLIC BLOOD PRESSURE: 65 MMHG | OXYGEN SATURATION: 100 % | SYSTOLIC BLOOD PRESSURE: 106 MMHG | TEMPERATURE: 98 F | HEART RATE: 98 BPM | RESPIRATION RATE: 18 BRPM

## 2024-01-09 DIAGNOSIS — N28.9 RENAL INSUFFICIENCY: ICD-10-CM

## 2024-01-09 DIAGNOSIS — S00.83XA CONTUSION OF FACE, INITIAL ENCOUNTER: ICD-10-CM

## 2024-01-09 DIAGNOSIS — E83.52 HYPERCALCEMIA: ICD-10-CM

## 2024-01-09 DIAGNOSIS — D64.9 ANEMIA, UNSPECIFIED TYPE: ICD-10-CM

## 2024-01-09 DIAGNOSIS — R42 DIZZINESS: Primary | ICD-10-CM

## 2024-01-09 DIAGNOSIS — R10.32 ABDOMINAL PAIN, LEFT LOWER QUADRANT: ICD-10-CM

## 2024-01-09 DIAGNOSIS — S00.81XA ABRASION OF FACE, INITIAL ENCOUNTER: ICD-10-CM

## 2024-01-09 DIAGNOSIS — R29.6 FREQUENT FALLS: ICD-10-CM

## 2024-01-09 DIAGNOSIS — K25.3 ACUTE GASTRIC ULCER, UNSPECIFIED WHETHER GASTRIC ULCER HEMORRHAGE OR PERFORATION PRESENT: ICD-10-CM

## 2024-01-09 LAB
ALBUMIN SERPL-MCNC: 4.1 G/DL (ref 3.2–4.8)
ALP LIVER SERPL-CCNC: 71 U/L
ALT SERPL-CCNC: 19 U/L
ANION GAP SERPL CALC-SCNC: 7 MMOL/L (ref 0–18)
AST SERPL-CCNC: 22 U/L (ref ?–34)
ATRIAL RATE: 78 BPM
ATRIAL RATE: 83 BPM
BASOPHILS # BLD AUTO: 0.05 X10(3) UL (ref 0–0.2)
BASOPHILS NFR BLD AUTO: 0.5 %
BILIRUB DIRECT SERPL-MCNC: 0.2 MG/DL (ref ?–0.3)
BILIRUB SERPL-MCNC: 0.5 MG/DL (ref 0.2–1.1)
BILIRUB UR QL: NEGATIVE
BUN BLD-MCNC: 34 MG/DL (ref 9–23)
BUN/CREAT SERPL: 19.2 (ref 10–20)
CALCIUM BLD-MCNC: 13.2 MG/DL (ref 8.7–10.4)
CHLORIDE SERPL-SCNC: 98 MMOL/L (ref 98–112)
CLARITY UR: CLEAR
CO2 SERPL-SCNC: 28 MMOL/L (ref 21–32)
COLOR UR: YELLOW
CREAT BLD-MCNC: 1.77 MG/DL
DEPRECATED RDW RBC AUTO: 45 FL (ref 35.1–46.3)
EGFRCR SERPLBLD CKD-EPI 2021: 32 ML/MIN/1.73M2 (ref 60–?)
EOSINOPHIL # BLD AUTO: 0.08 X10(3) UL (ref 0–0.7)
EOSINOPHIL NFR BLD AUTO: 0.8 %
ERYTHROCYTE [DISTWIDTH] IN BLOOD BY AUTOMATED COUNT: 13.2 % (ref 11–15)
ETHANOL SERPL-MCNC: <3 MG/DL (ref ?–3)
GLUCOSE BLD-MCNC: 108 MG/DL (ref 70–99)
GLUCOSE BLDC GLUCOMTR-MCNC: 132 MG/DL (ref 70–99)
GLUCOSE UR-MCNC: NORMAL MG/DL
HCT VFR BLD AUTO: 27.9 %
HGB BLD-MCNC: 9.4 G/DL
HGB UR QL STRIP.AUTO: NEGATIVE
IMM GRANULOCYTES # BLD AUTO: 0.08 X10(3) UL (ref 0–1)
IMM GRANULOCYTES NFR BLD: 0.8 %
KETONES UR-MCNC: NEGATIVE MG/DL
LEUKOCYTE ESTERASE UR QL STRIP.AUTO: NEGATIVE
LIPASE SERPL-CCNC: 32 U/L (ref 13–75)
LYMPHOCYTES # BLD AUTO: 1.3 X10(3) UL (ref 1–4)
LYMPHOCYTES NFR BLD AUTO: 13.1 %
MAGNESIUM SERPL-MCNC: 1.5 MG/DL (ref 1.6–2.6)
MCH RBC QN AUTO: 32.1 PG (ref 26–34)
MCHC RBC AUTO-ENTMCNC: 33.7 G/DL (ref 31–37)
MCV RBC AUTO: 95.2 FL
MONOCYTES # BLD AUTO: 0.92 X10(3) UL (ref 0.1–1)
MONOCYTES NFR BLD AUTO: 9.3 %
NEUTROPHILS # BLD AUTO: 7.5 X10 (3) UL (ref 1.5–7.7)
NEUTROPHILS # BLD AUTO: 7.5 X10(3) UL (ref 1.5–7.7)
NEUTROPHILS NFR BLD AUTO: 75.5 %
NITRITE UR QL STRIP.AUTO: NEGATIVE
OSMOLALITY SERPL CALC.SUM OF ELEC: 284 MOSM/KG (ref 275–295)
P AXIS: 55 DEGREES
P AXIS: 64 DEGREES
P-R INTERVAL: 190 MS
P-R INTERVAL: 192 MS
PH UR: 5.5 [PH] (ref 5–8)
PLATELET # BLD AUTO: 434 10(3)UL (ref 150–450)
POTASSIUM SERPL-SCNC: 3.8 MMOL/L (ref 3.5–5.1)
PROT SERPL-MCNC: 7.2 G/DL (ref 5.7–8.2)
PTH-INTACT SERPL-MCNC: 26.3 PG/ML (ref 18.5–88)
Q-T INTERVAL: 354 MS
Q-T INTERVAL: 372 MS
QRS DURATION: 92 MS
QRS DURATION: 96 MS
QTC CALCULATION (BEZET): 415 MS
QTC CALCULATION (BEZET): 424 MS
R AXIS: 32 DEGREES
R AXIS: 35 DEGREES
RBC # BLD AUTO: 2.93 X10(6)UL
SODIUM SERPL-SCNC: 133 MMOL/L (ref 136–145)
SP GR UR STRIP: 1.02 (ref 1–1.03)
T AXIS: 40 DEGREES
T AXIS: 43 DEGREES
TROPONIN I SERPL HS-MCNC: 7 NG/L
TSI SER-ACNC: 1.31 MIU/ML (ref 0.55–4.78)
UROBILINOGEN UR STRIP-ACNC: NORMAL
VENTRICULAR RATE: 78 BPM
VENTRICULAR RATE: 83 BPM
WBC # BLD AUTO: 9.9 X10(3) UL (ref 4–11)

## 2024-01-09 PROCEDURE — 99213 OFFICE O/P EST LOW 20 MIN: CPT | Performed by: NURSE PRACTITIONER

## 2024-01-09 PROCEDURE — 82962 GLUCOSE BLOOD TEST: CPT | Performed by: NURSE PRACTITIONER

## 2024-01-09 PROCEDURE — 99223 1ST HOSP IP/OBS HIGH 75: CPT | Performed by: HOSPITALIST

## 2024-01-09 PROCEDURE — 70551 MRI BRAIN STEM W/O DYE: CPT | Performed by: EMERGENCY MEDICINE

## 2024-01-09 PROCEDURE — 93000 ELECTROCARDIOGRAM COMPLETE: CPT | Performed by: NURSE PRACTITIONER

## 2024-01-09 PROCEDURE — 70450 CT HEAD/BRAIN W/O DYE: CPT | Performed by: EMERGENCY MEDICINE

## 2024-01-09 PROCEDURE — 74177 CT ABD & PELVIS W/CONTRAST: CPT | Performed by: EMERGENCY MEDICINE

## 2024-01-09 RX ORDER — MORPHINE SULFATE 2 MG/ML
2 INJECTION, SOLUTION INTRAMUSCULAR; INTRAVENOUS EVERY 2 HOUR PRN
Status: DISCONTINUED | OUTPATIENT
Start: 2024-01-09 | End: 2024-01-10

## 2024-01-09 RX ORDER — MORPHINE SULFATE 4 MG/ML
4 INJECTION, SOLUTION INTRAMUSCULAR; INTRAVENOUS EVERY 2 HOUR PRN
Status: DISCONTINUED | OUTPATIENT
Start: 2024-01-09 | End: 2024-01-10

## 2024-01-09 RX ORDER — LORAZEPAM 1 MG/1
1 TABLET ORAL
Status: DISCONTINUED | OUTPATIENT
Start: 2024-01-09 | End: 2024-01-10

## 2024-01-09 RX ORDER — MULTIPLE VITAMINS W/ MINERALS TAB 9MG-400MCG
1 TAB ORAL DAILY
Status: DISCONTINUED | OUTPATIENT
Start: 2024-01-09 | End: 2024-01-10

## 2024-01-09 RX ORDER — MORPHINE SULFATE 2 MG/ML
1 INJECTION, SOLUTION INTRAMUSCULAR; INTRAVENOUS EVERY 2 HOUR PRN
Status: DISCONTINUED | OUTPATIENT
Start: 2024-01-09 | End: 2024-01-10

## 2024-01-09 RX ORDER — MECLIZINE HYDROCHLORIDE 25 MG/1
25 TABLET ORAL ONCE
Status: COMPLETED | OUTPATIENT
Start: 2024-01-09 | End: 2024-01-09

## 2024-01-09 RX ORDER — MELATONIN
100 DAILY
Status: DISCONTINUED | OUTPATIENT
Start: 2024-01-10 | End: 2024-01-10

## 2024-01-09 RX ORDER — MELATONIN
100 ONCE
Status: COMPLETED | OUTPATIENT
Start: 2024-01-09 | End: 2024-01-09

## 2024-01-09 RX ORDER — SODIUM CHLORIDE 9 MG/ML
INJECTION, SOLUTION INTRAVENOUS CONTINUOUS
Status: DISCONTINUED | OUTPATIENT
Start: 2024-01-09 | End: 2024-01-10

## 2024-01-09 RX ORDER — ONDANSETRON 2 MG/ML
4 INJECTION INTRAMUSCULAR; INTRAVENOUS EVERY 6 HOURS PRN
Status: DISCONTINUED | OUTPATIENT
Start: 2024-01-09 | End: 2024-01-10

## 2024-01-09 RX ORDER — LORAZEPAM 1 MG/1
2 TABLET ORAL
Status: DISCONTINUED | OUTPATIENT
Start: 2024-01-09 | End: 2024-01-10

## 2024-01-09 RX ORDER — PROCHLORPERAZINE EDISYLATE 5 MG/ML
5 INJECTION INTRAMUSCULAR; INTRAVENOUS EVERY 8 HOURS PRN
Status: DISCONTINUED | OUTPATIENT
Start: 2024-01-09 | End: 2024-01-10

## 2024-01-09 RX ORDER — FOLIC ACID 1 MG/1
1 TABLET ORAL DAILY
Status: DISCONTINUED | OUTPATIENT
Start: 2024-01-09 | End: 2024-01-10

## 2024-01-09 RX ORDER — ACETAMINOPHEN 500 MG
500 TABLET ORAL EVERY 4 HOURS PRN
Status: DISCONTINUED | OUTPATIENT
Start: 2024-01-09 | End: 2024-01-10

## 2024-01-09 RX ORDER — MAGNESIUM SULFATE 1 G/100ML
1 INJECTION INTRAVENOUS ONCE
Status: COMPLETED | OUTPATIENT
Start: 2024-01-09 | End: 2024-01-09

## 2024-01-09 NOTE — H&P
[facility]    PATIENT'S NAME: ROSEMARY CLEARY   ATTENDING PHYSICIAN: Bandar Camp MD   PATIENT ACCOUNT#:   [id number]    LOCATION:  [place] [address]  MEDICAL RECORD #:   [id number]       YOB: 1960  ADMISSION DATE:       01/09/2024    HISTORY AND PHYSICAL EXAMINATION    CHIEF COMPLAINT:  Hypercalcemia, dehydration, and acute kidney injury.     HISTORY OF PRESENT ILLNESS:  The patient is a 63-year-old  female who came in today to the emergency department for evaluation of epigastric discomfort, gait imbalance, and multiple falls at home.  CBC showed hemoglobin of 9.4, which has dropped from baseline of 12.7 in October 2023.  Chemistry showed sodium 133, BUN and creatinine of 34 and 1.77, calcium 13.2, magnesium 1.5.  CT scan of the brain and MRI scan of the brain both showed no acute findings.  CT scan of the abdomen still pending.  PTH and alcohol level are still pending.  EKG today showed normal sinus rhythm.    PAST MEDICAL HISTORY:  Hypertension, hypothyroidism, generalized osteoarthritis, hyperlipidemia, chronic kidney disease stage 2 to 3, peptic ulcer disease secondary to NSAIDs and alcohol use.    PAST SURGICAL HISTORY:  Bilateral total hip arthroplasty; left wrist open reduction, internal fixation and right patella open reduction, internal fixation from prior multiple falls.  Also, she developed pyloric stricture secondary to peptic ulcer disease requiring laparoscopic pyloroplasty for gastric outlet obstruction.  She had perforated gastric ulcer requiring laparoscopic repair by General Surgery.    MEDICATIONS:  Please see medication reconciliation list.    FAMILY HISTORY:  Positive for heart disease.    SOCIAL HISTORY:  Heavy alcohol consumption with alcoholism.  No tobacco or drug use.  Lives with her .  Baseline independent for basic activities of daily living.    REVIEW OF SYSTEMS:  Per the , the patient is a heavy alcohol user, has been drinking heavily  and in the last 2 to 3 weeks she had gait imbalance and has fallen multiple times hitting her head on multiple objects.  Also, she has been having epigastric discomfort with difficulty with oral intake.  She continued to drink alcohol, but she cannot tell me how much and when was her last drink.  No vomiting.      PHYSICAL EXAMINATION:    GENERAL:  Alert and oriented to time, place, and person, mild distress.  VITAL SIGNS:  Temperature 97.0, pulse 81, respiratory rate 18, blood pressure 140/82, pulse ox 99% on room air.  HEENT:  Atraumatic.  Oropharynx clear.  Dry mucous membranes.  Normal hard and soft palate.  Eyes:  Anicteric sclerae.  Periorbital ecchymosis noted on both sides.  NECK:  Supple.  No lymphadenopathy.  Trachea midline.    LUNGS:  Clear to auscultation bilaterally.  Normal respiratory effort.   HEART:  Regular rate and rhythm.  S1 and S2 auscultated.  No murmur.  ABDOMEN:  Soft, nondistended.  Discomfort to palpation left upper quadrant area.  No guarding or rebound tenderness.  Positive bowel sounds.  EXTREMITIES:  No peripheral edema, clubbing, or cyanosis.  NEUROLOGIC:  Decreased sensation to light touch in both feet and decrease proprioception in both feet.  Otherwise, no focal findings.  Gait imbalance noted upon examination.    ASSESSMENT AND PLAN:    1.   Ataxia.  Rule out cerebellar alcohol-induced ataxia.  Also, the patient has underlying severe neuropathy.  2.   Acute kidney injury, dehydration, and hypercalcemia most likely related to poor oral intake and large consumption of Tums.  3.   Epigastric pain with history of peptic ulcer disease.  Rectal exam done by the emergency room physician was negative for Hemoccult blood.  Rule out recurrent peptic ulcer disease.  4.   Alcohol abuse.  High risk for alcohol withdrawal.    The patient will be admitted to general medical floor.  IV fluids.  IV Protonix.  Obtain PTH and repeat calcium level in the morning.  Follow up on CT scan of the abdomen  and pelvis.  Obtain Gastroenterology and Endocrinology consult.  Clear liquid diet.  Place her on alcohol detox protocol and monitor her clinical status.  Further recommendations to follow.    Dictated By [place] MD  d: 01/09/2024 17:09:42  t: 01/09/2024 17:17:03  [facility] [id number]  FB/

## 2024-01-09 NOTE — ED QUICK NOTES
Orders for admission, patient is aware of plan and ready to go upstairs. Any questions, please call ED RN Marquita at extension [phone].     Patient Covid vaccination status: Fully vaccinated     COVID Test Ordered in ED: None    COVID Suspicion at Admission: N/A    Running Infusions:      Mental Status/LOC at time of transport: x4    Other pertinent information:   CIWA score: N/A   NIH score:  N/A

## 2024-01-09 NOTE — ED INITIAL ASSESSMENT (HPI)
Pt with dizziness x 1.5 weeks. States has had about 5-6 falls since dizziness symptoms began. Most recent fall was night at 10pm. Endorses feeling disorientated. + nausea

## 2024-01-09 NOTE — ED PROVIDER NOTES
Patient Seen in: [facility]      History     Chief Complaint   Patient presents with    Dizziness     Stated Complaint: Dizzy;fell    Subjective: This is a 63-year-old female, past medical history of hypertension, hyperlipidemia, hypothyroidism, presents to immediate care with  for evaluation of abdominal pain, nausea, fatigue, dizziness, frequent falls.  Patient states abdominal pain was her first symptom about 2 weeks ago.  She does report its to the left lower quadrant and radiates to back/flank.  She denies that it is associated with food intake.  She has no current complaints of abdominal pain.  Positive back pain.  Positive nausea without vomiting or diarrhea.  No urinary symptoms of: Pain, burning, frequency, retention.  No fever or chills.  Positive fatigue.  However, the most concerning symptom for both patient provider is dizziness and frequent falls.  Patient states this started about a week and a half ago.  She was at the theater and stood up to use the washroom.  Patient admits to falling and striking her face on carpet.  She does have multiple bruises and abrasions to face.  Nose appears asymmetrical.  She is not on blood thinners.  She was not evaluated after this episode of dizziness or falls.  Patient  states that patient has fallen about 5-6 times in the past week and a half.  Most recently patient fell last night on her way to the bathroom around 10 PM.  Patient denies any precipitating symptoms of headache, chest pain, vision changes, palpitations, shortness of breath leading to fall.   states that patient is mostly coherent after falls but does take about 15 minutes to get back to baseline.  Patient denies any difficulty with ambulation or her extremities.  No numbness tingling weakness fatigue.  Patient is without headache, vision changes.  Patient does report about 7 to 10 pound weight loss in the past week due to nausea.  She is without joint aches, muscle  aches, night sweats.  Patient and  deny that patient has been recently forgetful of dates, time, people.  No abnormal words.  Patient and  deny that patient has been placing items really do not belong.  Patient is ambulatory into immediate care with the assistance of her .  Patient is answering all questions appropriately.  AOx4  The history is provided by the patient and the spouse.           Objective:   No pertinent past medical history.            No pertinent past surgical history.              No pertinent social history.            Review of Systems   Constitutional:  Positive for appetite change, fatigue and unexpected weight change. Negative for activity change, chills, diaphoresis and fever.   HENT: Negative.     Eyes: Negative.  Negative for photophobia and visual disturbance.   Respiratory: Negative.     Cardiovascular: Negative.  Negative for chest pain, palpitations and leg swelling.   Gastrointestinal:  Positive for abdominal pain and nausea. Negative for constipation, diarrhea, rectal pain and vomiting.   Genitourinary: Negative.    Musculoskeletal:  Positive for back pain.   Skin:  Positive for wound.   Neurological:  Positive for dizziness.   Psychiatric/Behavioral: Negative.         Positive for stated complaint: Dizzy;fell  Other systems are as noted in HPI.  Constitutional and vital signs reviewed.      All other systems reviewed and negative except as noted above.    Physical Exam     ED Triage Vitals [01/09/24 1132]   /65   Pulse 98   Resp 18   Temp 97.7 °F (36.5 °C)   Temp src Oral   SpO2 100 %   O2 Device None (Room air)       Current:/65   Pulse 98   Temp 97.7 °F (36.5 °C) (Oral)   Resp 18   SpO2 100%         Physical Exam  HENT:      Head: Raccoon eyes, abrasion and contusion present.      Jaw: There is normal jaw occlusion. No trismus, tenderness, swelling, pain on movement or malocclusion.      Nose: Nasal deformity, signs of injury and nasal  tenderness present.      Mouth/Throat:      Lips: Pink.      Mouth: Mucous membranes are moist.      Dentition: Abnormal dentition. Dental caries present. No dental abscesses.   Eyes:      Extraocular Movements: Extraocular movements intact.      Right eye: Normal extraocular motion and no nystagmus.      Left eye: Normal extraocular motion and no nystagmus.      Pupils: Pupils are equal, round, and reactive to light.   Cardiovascular:      Rate and Rhythm: Normal rate.      Heart sounds: Normal heart sounds.   Pulmonary:      Effort: Pulmonary effort is normal. No respiratory distress.      Breath sounds: Normal breath sounds. No stridor. No wheezing, rhonchi or rales.   Chest:      Chest wall: No tenderness.   Abdominal:      General: There is no distension.      Palpations: Abdomen is soft. There is no mass.      Tenderness: There is no abdominal tenderness. There is no guarding.   Musculoskeletal:         General: Normal range of motion.      Cervical back: Normal range of motion and neck supple. No rigidity.   Lymphadenopathy:      Cervical: No cervical adenopathy.   Skin:     Capillary Refill: Capillary refill takes less than 2 seconds.      Findings: Abrasion and ecchymosis present.   Neurological:      Mental Status: She is alert and oriented to person, place, and time.      GCS: GCS eye subscore is 4. GCS verbal subscore is 5. GCS motor subscore is 6.      Cranial Nerves: Cranial nerves 2-12 are intact.      Sensory: Sensation is intact.      Motor: Motor function is intact. No tremor, abnormal muscle tone, seizure activity or pronator drift.      Coordination: Coordination abnormal. Heel to Shin Test normal. Rapid alternating movements normal.               ED Course   Labs Reviewed - No data to display  EKG    Rate, intervals and axes as noted on EKG Report.  Rate: 83  Rhythm: Sinus Rhythm  Reading: Normal sinus rhythm.  Possible left atrial enlargement.  No ST elevation.  When compared to previous EKG in  2018, similar findings.  Nothing acute.                            MDM      This is a 63-year-old female, presents to immediate care with  for dizziness and frequent falls over the past 1.5 weeks.     states patient has fallen about 5-6 times.  He estimates more, however states \"I was able to catch her those times\".  Patient denies any precipitating symptoms of headache, vision changes, nausea, chest pain, shortness of breath prior to dizziness and falls.    Patient's first fall was a week and a half ago when she went to use the restroom at a theater.  Patient states she fell face first onto carpet.  Patient does have multiple abrasions and ecchymosis to face.  Nose appears asymmetric and swollen with ecchymosis.  Ecchymosis to bilateral eyes.    Despite facial trauma, patient has no facial tenderness.  No TMJ.  No malocclusion.  No trismus.  No dental pain to percussion.  However, patient does have several teeth and states HKA and fractured.  She denies that dental fractures are from fall.  Patient is able to bite down on tongue depressor and really resist provider's twist.  Low suspicion for mandibular fracture.    Patient EOMs intact.  No pain limitation with EOMs.  No nystagmus.  No photophobia.  No facial asymmetry.  No pronator drift.  Patient is slow to follow direction on neuroexam.  While there is no impaired rapid alternating movements or abnormal finger-to-nose, patient is very slow in her movements.    Patient's most recent fall was last night around 10 PM while she was ambulating to use the bathroom.  While this could be positional orthostatic, patient does require further workup and evaluation and dedicated imaging of brain.    In immediate care she did have EKG and blood sugar obtained.  No arrhythmia.  No hypoglycemia.    Patient is not on blood thinners.  She denies any head or neck pain.    Multiple differentials considered including but not limited to: Intracranial abnormality such  as subdural hematoma, subarachnoid hemorrhage, brain mass, brain aneurysm.    Additionally considered Alzheimer's or dementia or MS.  Per  and patient, patient has not been more forgetful of people, dates, times.  No word swapping.  Patient has not gotten lost in familiar places.  No placement of household items in incorrect places.    Additionally, patient is with abdominal pain and nausea fatigue weight loss.  Hematological and oncological not completely ruled out.  Also considered diverticulosis versus diverticulitis.  Patient is currently without abdominal pain.  Nontender to left lower quadrant.  No palpable mass organomegaly.    Patient acuity is significantly high and requires higher level of care in emergency room.  Patient and  will self transport to ER.  Discussed case with supervising physician, Dr. Khalil who agrees.                                 Medical Decision Making  Amount and/or Complexity of Data Reviewed  ECG/medicine tests: ordered. Decision-making details documented in ED Course.        Disposition and Plan     Clinical Impression:  1. Dizziness    2. Frequent falls    3. Abdominal pain, left lower quadrant    4. Contusion of face, initial encounter    5. Abrasion of face, initial encounter         Disposition:  Ic to ed  1/9/2024 11:44 am    Follow-up:  No follow-up provider specified.        Medications Prescribed:  Discharge Medication List as of 1/9/2024 11:57 AM

## 2024-01-09 NOTE — ED PROVIDER NOTES
Patient Seen in: [facility] Emergency Department      History     Chief Complaint   Patient presents with    Dizziness     Stated Complaint: Dizziness, LLQ pain, L lower back. Nausea. Vomiting.    Subjective:   HPI    63-year-old female with listed history with a history of bleeding duodenal ulcer who drinks heavily it turns out per her  who has been falling recurrently and having dizzy spells over the last several weeks.  She has hit her head twice.  She is not on anticoagulants or antiplatelets.  She reports also some left lower quadrant pain.  No fever.    Objective:   Past Medical History:   Diagnosis Date    Broken wrist 2012    per [employer]:  \"Management:  orif left wrist\"    Disorder of thyroid     Duodenal ulcer     GI bleed     High blood pressure     High cholesterol     Hypothyroidism     Osteoarthritis of left hip 2010    Unspecified essential hypertension     Visual impairment               Past Surgical History:   Procedure Laterality Date    COLON SURGERY  03/01/2018    ELECTROCARDIOGRAM, COMPLETE  05-    Scanned to Media Tab - Date of Service 05-    FRACTURE SURGERY      HIP REPLACEMENT SURGERY Left 2010    left hip replacement    HIP REPLACEMENT SURGERY Right 2013    right hip replacement    OTHER Right 09/28/15    ORIF patella    TOTAL HIP REPLACEMENT      WRIST FRACTURE SURGERY Left 2012                Social History     Socioeconomic History    Marital status:    Tobacco Use    Smoking status: Never    Smokeless tobacco: Never   Vaping Use    Vaping Use: Never used   Substance and Sexual Activity    Alcohol use: Yes     Alcohol/week: 11.0 standard drinks of alcohol     Types: 11 Glasses of wine per week     Comment: few days a week    Drug use: No   Other Topics Concern    Caffeine Concern Yes     Comment: Coffee, occasionally              Review of Systems    Positive for stated complaint: Dizziness, LLQ pain, L lower back. Nausea. Vomiting.  Other systems are  as noted in HPI.  Constitutional and vital signs reviewed.      All other systems reviewed and negative except as noted above.    Physical Exam     ED Triage Vitals [01/09/24 1229]   /71   Pulse 82   Resp 16   Temp 97 °F (36.1 °C)   Temp src Temporal   SpO2 95 %   O2 Device None (Room air)       Current:/74   Pulse 87   Temp 97 °F (36.1 °C) (Temporal)   Resp 16   Wt 82 kg   SpO2 97%   BMI 25.21 kg/m²         Physical Exam    Constitutional: Oriented to person, place, and time.  Appears well-developed. No distress.   Head: Normocephalic.  Small abrasion to the anterior mid forehead and small healing bruise to the right anterior forehead  Eyes: Conjunctivae are normal. Pupils are equal, round, and reactive to light.   Neck: Normal range of motion. Neck supple.  No midline pain posteriorly  Cardiovascular: Normal rate, regular rhythm and intact and equal distal pulses.    Pulmonary/Chest: Effort normal. No respiratory distress.   Abdominal: Soft. There is no tenderness. There is no guarding.   Musculoskeletal: Normal range of motion. No edema or tenderness.   Neurological: Alert and oriented to person, place, and time.  No gross focal deficits.  I did ambulate the patient to the bathroom and her gait did seem unsteady.  She did have mild ataxia not preferential to 1 side or another.  Skin: Skin is warm and dry.   Psychiatric: Normal mood and affect.  Behavior is normal.   Nursing note and vitals reviewed.    Differential diagnosis includes recurrent falls, TIA, CVA, intracranial hemorrhage, intracranial mass, UTI, dehydration, anemia and bleeding ulcer, electrolyte abnormality.      ED Course     Labs Reviewed   BASIC METABOLIC PANEL (8) - Abnormal; Notable for the following components:       Result Value    Glucose 108 (*)     Sodium 133 (*)     BUN 34 (*)     Creatinine 1.77 (*)     Calcium, Total 13.2 (*)     eGFR-Cr 32 (*)     All other components within normal limits   URINALYSIS WITH CULTURE  REFLEX - Abnormal; Notable for the following components:    Protein Urine Trace (*)     All other components within normal limits   MAGNESIUM - Abnormal; Notable for the following components:    Magnesium 1.5 (*)     All other components within normal limits   CBC W/ DIFFERENTIAL - Abnormal; Notable for the following components:    RBC 2.93 (*)     HGB 9.4 (*)     HCT 27.9 (*)     All other components within normal limits   HEPATIC FUNCTION PANEL (7) - Normal   TROPONIN I HIGH SENSITIVITY - Normal   TSH W REFLEX TO FREE T4 - Normal   ETHYL ALCOHOL - Normal   PTH, INTACT - Normal   CBC WITH DIFFERENTIAL WITH PLATELET    Narrative:     The following orders were created for panel order CBC With Differential With Platelet.  Procedure                               Abnormality         Status                     ---------                               -----------         ------                     CBC W/ DIFFERENTIAL[[id number]]          Abnormal            Final result                 Please view results for these tests on the individual orders.   LIPASE     EKG    Rate, intervals and axes as noted on EKG Report.  Rate: 78  Rhythm: Sinus Rhythm  Reading: No gross acute ischemic changes                 CT ABDOMEN PELVIS IV CONTRAST, NO ORAL (ER)    Result Date: 1/9/2024  PROCEDURE: CT ABDOMEN PELVIS IV CONTRAST NO ORAL (ER)  COMPARISON: [facility], CT ABDOMEN + PELVIS (CONTRAST ONLY) (CPT=74177), 12/21/2022, 11:40 AM.  INDICATIONS: LLQ abdominal pain, N/V with dizziness.  TECHNIQUE: Multidetector CT images of the abdomen and pelvis were obtained with non-ionic intravenous contrast material. Automated exposure control for dose reduction was used. Adjustment of the mA and/or kV was done based on the patient's size. Iterative reconstruction technique for dose reduction was employed. Oral contrast was ingested.  FINDINGS: LUNG BASES: The heart is normal in size. There is dependent subsegmental  atelectasis bilaterally. LIVER: Nonspecific low density of the hepatic parenchyma may represent underlying hepatic steatosis. BILIARY: The gallbladder is present. PANCREAS: Mild inflammatory stranding at the pancreatic tail (possibly reactive to gastric ulcer disease-see below).  No well-defined/drainable peripancreatic collection or pancreatic ductal dilation. SPLEEN: No enlargement.  ADRENALS:   No defined mass or abnormal enlargement.  KIDNEYS:   Symmetric enhancement is seen without evidence of hydronephrosis or underlying solid masses.  Probable small bilateral renal cysts. GI/MESENTERY:  There is no evidence of bowel obstruction.  Along the dependent gastric body/greater curvature, there is a suspected large 2.8 cm gastric ulcer; mild surrounding inflammatory stranding that extends to the pancreatic tail; there are associated mildly enlarged gastrohepatic ligament lymph nodes, which measure up to 1 cm short axis; stable postoperative changes of the distal stomach with adjacent surgical clips.  Colonic diverticulosis, please note that segments of the colon are incompletely distended and suboptimally evaluated, mild-to-moderate colonic fecal burden.  Normal appendix.  URINARY BLADDER: Incompletely distended and suboptimally evaluated. PELVIC NODES: No lymphadenopathy.   PELVIC ORGANS: The uterus is present.  Small 1.7 cm left ovarian cyst is unchanged. VASCULATURE:   No aneurysm is detected. RETROPERITONEUM: No mass or lymphadenopathy is apparent.  BONES:   Bilateral hip arthroplasties are noted; resultant streak artifacts limit evaluation of the pelvic structures.  Demineralization.  Stable chronic mild-to-moderate L1 and L2 vertebral body compression fractures.  Multilevel lumbar spine degenerative changes. ABDOMINAL WALL: Tiny fat containing umbilical hernia. OTHER: No free air or fluid is seen in the abdomen or pelvis.          CONCLUSION:  1. There is a suspected large 2.7 cm gastric ulcer along the  dependent gastric body/greater curvature.  Mild surrounding inflammatory stranding that extends to the pancreatic tail.  No adjacent free intraperitoneal air or well-defined/drainable intra-abdominal collection to suggest perforation or abscess.  Gastroenterology evaluation is suggested with strong consideration of EGD correlation.  Less likely that findings relate to pancreatitis at the pancreatic tail, but request correlation with serum lipase levels. 2. Stable postoperative changes at the distal stomach. 3. Stable mildly enlarged gastrohepatic ligament lymph nodes, which may be reactive. 4. Fatty liver. 5. Colonic diverticulosis.  No CT evidence of acute diverticulitis. 6. Small 1.7 cm simple-appearing left ovarian cyst is unchanged since comparison CT from December, 2022. 7. Circumferential urinary bladder wall thickening, which may relate to incomplete distention or cystitis.  If there are referable symptoms, urinalysis correlation is requested. 8. Bilateral hip arthroplasties are noted; resultant streak artifacts limit evaluation of the pelvic structures. 9. Stable chronic L1 and L2 vertebral body compression fractures. 10. Lesser incidental findings as above.   elm-remote  Dictated by (CST): Maverick Petersen MD on 1/09/2024 at 5:05 PM     Finalized by (CST): Maverick Petersen MD on 1/09/2024 at 5:14 PM          CT BRAIN OR HEAD (79921)    Result Date: 1/9/2024  PROCEDURE: CT BRAIN OR HEAD (CPT=70450)  COMPARISON: [facility], MRI BRAIN WO ACUTE (3) SEQUENCE(CPT=70551), 1/09/2024, 3:34 PM.  INDICATIONS: Nausea, vomiting, dizziness.  TECHNIQUE: CT images were obtained without contrast material.  Automated exposure control for dose reduction was used.  Dose information is transmitted to the ACR (American College of Radiology) NRDR (National Radiology Data Registry) which includes the Dose Index Registry.  FINDINGS:  CSF SPACES: No hydrocephalus, subarachnoid hemorrhage, or effacement of the basal  cisterns is appreciated. There is no extra-axial fluid collection. CEREBRUM: No acute intraparenchymal hemorrhage, edema, or cortical sulcal effacement is apparent. There is no space-occupying lesion, mass effect, or shift of midline structures. The gray-white matter junction is preserved and bilaterally symmetric in appearance. Scattered hypodense foci noted in the subcortical and periventricular white matter of both cerebral hemispheres. CEREBELLUM: No edema, hemorrhage, mass, or acute infarction is seen. BRAINSTEM: No edema, hemorrhage, mass, or acute infarction is seen.  CALVARIUM: There is no apparent depressed fracture, mass, or other significant visible lesion.  Soft tissue injury overlying the right lateral forehead. SINUSES: Limited views demonstrate no significant mucosal thickening or fluid.  ORBITS: Limited views are grossly unremarkable.  OTHER: Negative.         CONCLUSION:  1. No acute intracranial hemorrhage or other acute intracranial finding. 2. Small focus of soft tissue injury at the right lateral forehead.   elm-remote  Dictated by (CST): Maverick Petersen MD on 1/09/2024 at 5:00 PM     Finalized by (CST): Maverick Petersen MD on 1/09/2024 at 5:01 PM          MRI BRAIN WO ACUTE (3) SEQUENCE (CPT=70551)    Result Date: 1/9/2024  PROCEDURE: MRI BRAIN WO ACUTE (3) SEQUENCE(CPT=70551)  COMPARISON: None.  INDICATIONS: Dizziness, LLQ abdominal pain, L lower back. Nausea. Vomiting.  TECHNIQUE: Limited ER protocol imaging of the brain was performed utilizing diffusion weighted, T2-weighted FLAIR, and gradient recalled echo sequences in the axial plane.  FINDINGS:  CSF SPACES: The ventricles, cisterns, and sulci are commensurate in caliber and appropriate for age. No hydrocephalus, subarachnoid hemorrhage, or effacement of the basal cisterns is appreciated. There is no extra-axial fluid collection. CEREBRUM: Scattered foci of T2/FLAIR hyperintensity in the subcortical and periventricular white matter of both  cerebral hemispheres. No intraparenchymal hemorrhage, edema, or cortical sulcal effacement is apparent. There is no space-occupying lesion, mass effect, or shift of midline structures. There is no diffusion restriction to suggest acute or subacute ischemia. CEREBELLUM: No edema, hemorrhage, mass, acute infarction, or significant atrophy.  BRAINSTEM: The craniocervical junction is uncompromised.  CALVARIUM: There is no apparent fracture, mass, or other significant visible lesion.  SINUSES: Dependent left maxillary sinus retention cyst. ORBITS: Limited views are grossly unremarkable.  OTHER: The flow voids of the major intracranial vessels are visualized.         CONCLUSION:  1. Limited ER protocol 3 sequence imaging of the brain was performed.  No acute intracranial abnormality. Specifically, no evidence of acute or early subacute infarction. 2. Mild nonspecific white matter changes throughout both cerebral hemispheres, which most likely relate to early sequelae of chronic microangiopathy. 3. Dependent left maxillary sinus retention cyst. 4. Lesser incidental findings as above.   elm-remote  Dictated by (CST): Maverick Petersen MD on 1/09/2024 at 3:55 PM     Finalized by (CST): Maverick Petersen MD on 1/09/2024 at 3:57 PM                  [facility]        Admission disposition: 1/9/2024  5:26 PM                                        Medical Decision Making  Patient has been stable here but does have significant findings on exam.  I think her hypercalcemia is likely related to her excessive Tums ingestion.   states she drinks almost a bottle of wine a day.  It appears she likely has a drop in her hemoglobin although her rectal exam is grossly negative for melena at this time.  Her CT does show evidence of a likely large gastric ulcer.  I did consult GI and endocrine on behalf of the hospitalist.  The hospitalist saw the patient with me.  She will be started on IV fluids.  Her calcium will need to be monitored carefully.   I did replace her magnesium as well.  No indication for blood transfusion.  I did give her IV Protonix.  She will be admitted to hospital for further management and care.    Problems Addressed:  Acute gastric ulcer, unspecified whether gastric ulcer hemorrhage or perforation present: acute illness or injury that poses a threat to life or bodily functions  Anemia, unspecified type: acute illness or injury  Dizziness: acute illness or injury  Hypercalcemia: acute illness or injury that poses a threat to life or bodily functions  Renal insufficiency: chronic illness or injury    Amount and/or Complexity of Data Reviewed  External Data Reviewed: ECG.     Details: September 2018 EKG reviewed, no appreciable morphologic change from today's EKG  Labs: ordered. Decision-making details documented in ED Course.  Radiology: ordered and independent interpretation performed. Decision-making details documented in ED Course.     Details: By my gross review of the plain brain MRI did not appreciate obvious evidence of mass or midline shift  ECG/medicine tests: ordered and independent interpretation performed. Decision-making details documented in ED Course.    Risk  OTC drugs.  Prescription drug management.  Decision regarding hospitalization.    Critical Care  Total time providing critical care: minutes (I spent a total of 35 minutes of critical care time in obtaining history, performing a physical exam, bedside monitoring of interventions, collecting and interpreting tests and discussion with consultants but not including time spent performing procedures.)        Disposition and Plan     Clinical Impression:  1. Dizziness    2. Anemia, unspecified type    3. Hypercalcemia    4. Renal insufficiency    5. Acute gastric ulcer, unspecified whether gastric ulcer hemorrhage or perforation present         Disposition:  Admit  1/9/2024  5:26 pm    Follow-up:  No follow-up provider specified.        Medications Prescribed:  Current  Discharge Medication List                            Hospital Problems       Present on Admission  Date Reviewed: 1/9/2024            ICD-10-CM Noted POA    * (Principal) Dizziness R42 1/9/2024 Unknown

## 2024-01-09 NOTE — ED INITIAL ASSESSMENT (HPI)
Connected Care Resource Center Contact  [facility]/Voicemail     Clinical Data: Transitional Care Management Outreach     Outreach attempted x 2.  Left message on patient's voicemail, providing [facility]'s 24/7 scheduling and nurse triage phone number [phone] ([phone]) for questions/concerns and/or to schedule an appt with an [facility] provider, if they do not have a PCP.      Plan:  [facility] will do no further outreaches at this time.       LEWIS Villalobos  Connected Care Resource [place], [facility]    *Connected Care Resource Team does NOT follow patient ongoing. Referrals are identified based on internal discharge reports and the outreach is to ensure patient has an understanding of their discharge instructions.   Pt sent by IC for dizziness x 1.5 weeks, usually comes on with standing. Pt denies MUKHERJEE.   Pt fell last night, denies head injury.

## 2024-01-10 ENCOUNTER — ANESTHESIA EVENT (OUTPATIENT)
Dept: ENDOSCOPY | Facility: HOSPITAL | Age: 64
End: 2024-01-10
Payer: COMMERCIAL

## 2024-01-10 ENCOUNTER — ANESTHESIA (OUTPATIENT)
Dept: ENDOSCOPY | Facility: HOSPITAL | Age: 64
End: 2024-01-10
Payer: COMMERCIAL

## 2024-01-10 VITALS
RESPIRATION RATE: 19 BRPM | TEMPERATURE: 98 F | OXYGEN SATURATION: 98 % | BODY MASS INDEX: 24.92 KG/M2 | WEIGHT: 178 LBS | SYSTOLIC BLOOD PRESSURE: 131 MMHG | DIASTOLIC BLOOD PRESSURE: 72 MMHG | HEART RATE: 88 BPM | HEIGHT: 71 IN

## 2024-01-10 PROBLEM — K25.3 ACUTE GASTRIC ULCER WITHOUT HEMORRHAGE OR PERFORATION: Status: ACTIVE | Noted: 2024-01-09

## 2024-01-10 PROBLEM — R10.13 EPIGASTRIC PAIN: Status: ACTIVE | Noted: 2024-01-10

## 2024-01-10 PROBLEM — F39 EPISODIC MOOD DISORDER (HCC): Status: ACTIVE | Noted: 2024-01-10

## 2024-01-10 PROBLEM — F10.930 ALCOHOL WITHDRAWAL SYNDROME, UNCOMPLICATED (HCC): Status: ACTIVE | Noted: 2024-01-10

## 2024-01-10 PROBLEM — F39 EPISODIC MOOD DISORDER: Status: ACTIVE | Noted: 2024-01-10

## 2024-01-10 PROBLEM — F10.20 ALCOHOL DEPENDENCE, CONTINUOUS (HCC): Status: ACTIVE | Noted: 2024-01-10

## 2024-01-10 LAB
ANION GAP SERPL CALC-SCNC: 7 MMOL/L (ref 0–18)
ANTIBODY SCREEN: NEGATIVE
BASOPHILS # BLD AUTO: 0.05 X10(3) UL (ref 0–0.2)
BASOPHILS NFR BLD AUTO: 0.6 %
BUN BLD-MCNC: 25 MG/DL (ref 9–23)
BUN/CREAT SERPL: 16.9 (ref 10–20)
CALCIUM BLD-MCNC: 11.8 MG/DL (ref 8.7–10.4)
CHLORIDE SERPL-SCNC: 103 MMOL/L (ref 98–112)
CO2 SERPL-SCNC: 27 MMOL/L (ref 21–32)
CREAT BLD-MCNC: 1.48 MG/DL
DEPRECATED RDW RBC AUTO: 47 FL (ref 35.1–46.3)
EGFRCR SERPLBLD CKD-EPI 2021: 39 ML/MIN/1.73M2 (ref 60–?)
EOSINOPHIL # BLD AUTO: 0.16 X10(3) UL (ref 0–0.7)
EOSINOPHIL NFR BLD AUTO: 1.9 %
ERYTHROCYTE [DISTWIDTH] IN BLOOD BY AUTOMATED COUNT: 13.4 % (ref 11–15)
GLUCOSE BLD-MCNC: 89 MG/DL (ref 70–99)
HCT VFR BLD AUTO: 26.8 %
HGB BLD-MCNC: 8.7 G/DL
HGB BLD-MCNC: 8.8 G/DL
IMM GRANULOCYTES # BLD AUTO: 0.05 X10(3) UL (ref 0–1)
IMM GRANULOCYTES NFR BLD: 0.6 %
LYMPHOCYTES # BLD AUTO: 1.11 X10(3) UL (ref 1–4)
LYMPHOCYTES NFR BLD AUTO: 13.4 %
MCH RBC QN AUTO: 31.4 PG (ref 26–34)
MCHC RBC AUTO-ENTMCNC: 32.5 G/DL (ref 31–37)
MCV RBC AUTO: 96.8 FL
MONOCYTES # BLD AUTO: 0.77 X10(3) UL (ref 0.1–1)
MONOCYTES NFR BLD AUTO: 9.3 %
NEUTROPHILS # BLD AUTO: 6.12 X10 (3) UL (ref 1.5–7.7)
NEUTROPHILS # BLD AUTO: 6.12 X10(3) UL (ref 1.5–7.7)
NEUTROPHILS NFR BLD AUTO: 74.2 %
OSMOLALITY SERPL CALC.SUM OF ELEC: 288 MOSM/KG (ref 275–295)
PLATELET # BLD AUTO: 424 10(3)UL (ref 150–450)
POTASSIUM SERPL-SCNC: 3.2 MMOL/L (ref 3.5–5.1)
RBC # BLD AUTO: 2.77 X10(6)UL
RH BLOOD TYPE: POSITIVE
SODIUM SERPL-SCNC: 137 MMOL/L (ref 136–145)
VIT D+METAB SERPL-MCNC: 30.7 NG/ML (ref 30–100)
WBC # BLD AUTO: 8.3 X10(3) UL (ref 4–11)

## 2024-01-10 PROCEDURE — 43235 EGD DIAGNOSTIC BRUSH WASH: CPT | Performed by: INTERNAL MEDICINE

## 2024-01-10 PROCEDURE — 0DJ08ZZ INSPECTION OF UPPER INTESTINAL TRACT, VIA NATURAL OR ARTIFICIAL OPENING ENDOSCOPIC: ICD-10-PCS | Performed by: INTERNAL MEDICINE

## 2024-01-10 PROCEDURE — 99223 1ST HOSP IP/OBS HIGH 75: CPT | Performed by: INTERNAL MEDICINE

## 2024-01-10 PROCEDURE — 99239 HOSP IP/OBS DSCHRG MGMT >30: CPT | Performed by: HOSPITALIST

## 2024-01-10 PROCEDURE — 99222 1ST HOSP IP/OBS MODERATE 55: CPT | Performed by: INTERNAL MEDICINE

## 2024-01-10 PROCEDURE — 90792 PSYCH DIAG EVAL W/MED SRVCS: CPT | Performed by: OTHER

## 2024-01-10 RX ORDER — ESCITALOPRAM OXALATE 5 MG/1
5 TABLET ORAL NIGHTLY
Qty: 30 TABLET | Refills: 0 | Status: SHIPPED | OUTPATIENT
Start: 2024-01-10 | End: 2024-01-17

## 2024-01-10 RX ORDER — SODIUM CHLORIDE, SODIUM LACTATE, POTASSIUM CHLORIDE, CALCIUM CHLORIDE 600; 310; 30; 20 MG/100ML; MG/100ML; MG/100ML; MG/100ML
INJECTION, SOLUTION INTRAVENOUS CONTINUOUS PRN
Status: DISCONTINUED | OUTPATIENT
Start: 2024-01-10 | End: 2024-01-10 | Stop reason: SURG

## 2024-01-10 RX ORDER — PANTOPRAZOLE SODIUM 40 MG/1
40 TABLET, DELAYED RELEASE ORAL
Qty: 60 TABLET | Refills: 0 | Status: SHIPPED | OUTPATIENT
Start: 2024-01-10 | End: 2024-02-09

## 2024-01-10 RX ORDER — DIAZEPAM 5 MG/ML
2.5 INJECTION, SOLUTION INTRAMUSCULAR; INTRAVENOUS EVERY 6 HOURS PRN
Status: DISCONTINUED | OUTPATIENT
Start: 2024-01-10 | End: 2024-01-10

## 2024-01-10 RX ORDER — QUETIAPINE FUMARATE 25 MG/1
50 TABLET, FILM COATED ORAL NIGHTLY
Status: DISCONTINUED | OUTPATIENT
Start: 2024-01-10 | End: 2024-01-10

## 2024-01-10 RX ORDER — MAGNESIUM OXIDE 400 MG/1
800 TABLET ORAL ONCE
Status: DISCONTINUED | OUTPATIENT
Start: 2024-01-10 | End: 2024-01-10

## 2024-01-10 RX ORDER — ONDANSETRON 2 MG/ML
4 INJECTION INTRAMUSCULAR; INTRAVENOUS ONCE AS NEEDED
Status: DISCONTINUED | OUTPATIENT
Start: 2024-01-10 | End: 2024-01-10

## 2024-01-10 RX ORDER — LIDOCAINE HYDROCHLORIDE 10 MG/ML
INJECTION, SOLUTION EPIDURAL; INFILTRATION; INTRACAUDAL; PERINEURAL AS NEEDED
Status: DISCONTINUED | OUTPATIENT
Start: 2024-01-10 | End: 2024-01-10 | Stop reason: SURG

## 2024-01-10 RX ORDER — SODIUM CHLORIDE, SODIUM LACTATE, POTASSIUM CHLORIDE, CALCIUM CHLORIDE 600; 310; 30; 20 MG/100ML; MG/100ML; MG/100ML; MG/100ML
INJECTION, SOLUTION INTRAVENOUS CONTINUOUS
Status: DISCONTINUED | OUTPATIENT
Start: 2024-01-10 | End: 2024-01-10

## 2024-01-10 RX ORDER — POTASSIUM CHLORIDE 20 MEQ/1
40 TABLET, EXTENDED RELEASE ORAL ONCE
Status: COMPLETED | OUTPATIENT
Start: 2024-01-10 | End: 2024-01-10

## 2024-01-10 RX ORDER — ESCITALOPRAM OXALATE 5 MG/1
5 TABLET ORAL NIGHTLY
Status: DISCONTINUED | OUTPATIENT
Start: 2024-01-10 | End: 2024-01-10

## 2024-01-10 RX ADMIN — SODIUM CHLORIDE, SODIUM LACTATE, POTASSIUM CHLORIDE, CALCIUM CHLORIDE: 600; 310; 30; 20 INJECTION, SOLUTION INTRAVENOUS at 14:47:00

## 2024-01-10 RX ADMIN — SODIUM CHLORIDE, SODIUM LACTATE, POTASSIUM CHLORIDE, CALCIUM CHLORIDE: 600; 310; 30; 20 INJECTION, SOLUTION INTRAVENOUS at 15:04:00

## 2024-01-10 RX ADMIN — LIDOCAINE HYDROCHLORIDE 50 MG: 10 INJECTION, SOLUTION EPIDURAL; INFILTRATION; INTRACAUDAL; PERINEURAL at 14:49:00

## 2024-01-10 NOTE — OPERATIVE REPORT
ESOPHAGOGASTRODUODENOSCOPY (EGD) REPORT    Marissa Delaney     1960 Age 63 year old   PCP Alyx Olivo MD Endoscopist Nasra Mary MD     Date of procedure: 01/10/24    Procedure: EGD     Pre-operative diagnosis: epigastric pain    Post-operative diagnosis: see impression    Medications: MAC    Complications: none    Procedure:  Informed consent was obtained from the patient after the risks of the procedure were discussed, including but not limited to bleeding, perforation, aspiration, infection, or possibility of a missed lesion. After discussions of the risks/benefits and alternatives to this procedure, as well as the planned sedation, the patient was placed in the left lateral decubitus position and begun on continuous blood pressure pulse oximetry and EKG monitoring and this was maintained throughout the procedure. Once an adequate level of sedation was obtained a bite block was placed. Then the lubricated tip of the [id number] diagnostic video upper endoscope was inserted and advanced using direct visualization into the posterior pharynx and ultimately into the esophagus, stomach, and duodenum.    Complications: None    Findings:      1. Esophagus: Normal esophagus    2. Stomach: We then entered the stomach. A huge 3 x 2 cm deep ulcer in the greater curvature, non-bleeding but multiple areas of friability and pigmentation at base. Borders were flat and not raised. Sutures in antrum from prior surgery, no ulcerations or erosions in antrum.  A retroflexed view allowed examination of the angularis, cardia and fundus and these areas appeared normal with a non-patulous cardia. No hiatal hernia seen.      3. Duodenum: A 15 mm clean based ulcer in the duodenal bulb, non-bleeding. The duodenal mucosa appeared normal in the 2nd portion of the duodenum. Bilious effluent in second portion.     We then withdrew the instrument from the patient who tolerated the procedure well.     Impression:   1. Large 3 x 2 cm  deep ulcer along greater curvature of stomach, non-bleeding  2. Clean based 15 mm duodenal bulb ulcer    Recommend:  1. Continue ppi bid  2. No nsaids  3. Stop ETOH  4. Surveillance EGD in 6 weeks    >>>If tissue was sampled/removed and you have not received your pathology results either by phone or letter within 2 weeks, please call our office at [phone].    Specimens:  none     Blood loss: <1 ml      Nasra Mary MD  [facility]astroenter[facility]

## 2024-01-10 NOTE — CONSULTS
[facility]    Report of Consultation    Marissa Delaney Patient Status:  Inpatient    1960 MRN [id number]   Location [facility] 5SW/SE Attending Kushal Clay MD   Hosp Day # 1 PCP Alyx Olivo MD     Date of Admission:  2024  Date of Consult:  1/10/2024    Reason for Consultation:  Hypercalcemia    History of Present Illness:  Marissa Delaney is a a(n) 63 year old female.     62 y/o F with h/o hypothyroidism presents for initial evaluation of hypercalcemia.  She presented to ED yesterday with significant abdominal pain. She does have h/o duodenal ulceration and noted to have elevated calcium on presentation.  She admits to having 6 tums per day over the past several weeks due to abdominal pain.  No previous h/o hypercalcemia.     History:  Past Medical History:   Diagnosis Date    Broken wrist     per [employer]:  \"Management:  orif left wrist\"    Disorder of thyroid     Duodenal ulcer     GI bleed     High blood pressure     High cholesterol     Hypothyroidism     Osteoarthritis of left hip     Renal insufficiency 2024    Unspecified essential hypertension     Visual impairment      Past Surgical History:   Procedure Laterality Date    COLON SURGERY  2018    ELECTROCARDIOGRAM, COMPLETE  2013    Scanned to Media Tab - Date of Service 2013    FRACTURE SURGERY      HIP REPLACEMENT SURGERY Left     left hip replacement    HIP REPLACEMENT SURGERY Right     right hip replacement    OTHER Right 09/28/15    ORIF patella    TOTAL HIP REPLACEMENT      WRIST FRACTURE SURGERY Left      Family History   Problem Relation Age of Onset    Arthritis Mother     Heart Disease Father         Valve repair      reports that she has never smoked. She has never used smokeless tobacco. She reports current alcohol use of about 11.0 standard drinks of alcohol per week. She reports that she does not use drugs.    Allergies:  No Known Allergies    Medications:    Current  Facility-Administered Medications:     magnesium oxide (Mag-Ox) tab 800 mg, 800 mg, Oral, Once    pantoprazole (Protonix) 40 mg in sodium chloride 0.9% PF 10 mL IV push, 40 mg, Intravenous, Q12H    escitalopram (Lexapro) tab 5 mg, 5 mg, Oral, Nightly    QUEtiapine (SEROquel) tab 50 mg, 50 mg, Oral, Nightly    diazepam (Valium) 5 mg/mL injection 2.5 mg, 2.5 mg, Intravenous, Q6H PRN    sodium chloride 0.9% infusion, , Intravenous, Continuous    acetaminophen (Tylenol Extra Strength) tab 500 mg, 500 mg, Oral, Q4H PRN    ondansetron (Zofran) 4 MG/2ML injection 4 mg, 4 mg, Intravenous, Q6H PRN    prochlorperazine (Compazine) 10 MG/2ML injection 5 mg, 5 mg, Intravenous, Q8H PRN    morphINE PF 2 MG/ML injection 1 mg, 1 mg, Intravenous, Q2H PRN **OR** morphINE PF 2 MG/ML injection 2 mg, 2 mg, Intravenous, Q2H PRN **OR** morphINE PF 4 MG/ML injection 4 mg, 4 mg, Intravenous, Q2H PRN    LORazepam (Ativan) tab 1 mg, 1 mg, Oral, Q1H PRN **OR** LORazepam (Ativan) tab 2 mg, 2 mg, Oral, Q1H PRN    metoprolol tartrate (Lopressor) tab 25 mg, 25 mg, Oral, BID PRN    thiamine (Vitamin B1) tab 100 mg, 100 mg, Oral, Daily    multivitamin with minerals (Thera M Plus) tab 1 tablet, 1 tablet, Oral, Daily    folic acid (Folvite) tab 1 mg, 1 mg, Oral, Daily    A comprehensive 10 point review of systems was completed.  Pertinent positives and negatives noted in the the HPI.      Physical Exam:  Blood pressure 124/71, pulse 85, temperature 98 °F (36.7 °C), temperature source Oral, resp. rate 17, height 5' 11\" (1.803 m), weight 178 lb (80.7 kg), SpO2 96%, not currently breastfeeding.    General: Alert, orientated x3.  Cooperative.  No apparent distress.  HEENT: Exam is unremarkable.  Neck: Supple.  Lungs: Clear bilaterally.  Cardiac: Regular rate and rhythm.  Abdomen:  Bowel sounds present, normoactive.  Nontender.  Extremities:  No lower extremity edema noted.  Skin: Normal texture and turgor.  Lymphatic:  No cervical  lymphadenopathy.    Impression and Plan:  Patient Active Problem List   Diagnosis    Essential hypertension    Hypothyroidism    Osteoarthritis of left hip    Hyperlipidemia    Alcohol abuse    History of GI bleed    Dizziness    Anemia, unspecified type    Hypercalcemia    Renal insufficiency    Acute gastric ulcer, unspecified whether gastric ulcer hemorrhage or perforation present       Hypercalcemia  - Likely secondary to Tums ingestion  - PTH level is not elevated   - Calcium is improved this AM  - Check Vitamin D levels to complete work up   - Continue IVF    Will follow     Trina Patterson MD  1/10/2024  12:07 PM

## 2024-01-10 NOTE — PLAN OF CARE
Problem: Patient Centered Care  Goal: Patient preferences are identified and integrated in the patient's plan of care  Description: Interventions:  - What would you like us to know as we care for you?   - Provide timely, complete, and accurate information to patient/family  - Incorporate patient and family knowledge, values, beliefs, and cultural backgrounds into the planning and delivery of care  - Encourage patient/family to participate in care and decision-making at the level they choose  - Honor patient and family perspectives and choices  Outcome: Progressing     Problem: Patient/Family Goals  Goal: Patient/Family Long Term Goal  Description: Patient's Long Term Goal:   Interventions:  -   - See additional Care Plan goals for specific interventions  Outcome: Progressing  Goal: Patient/Family Short Term Goal  Description: Patient's Short Term Goal:     Interventions:   -   - See additional Care Plan goals for specific interventions  Outcome: Progressing     Problem: CARDIOVASCULAR - ADULT  Goal: Maintains optimal cardiac output and hemodynamic stability  Description: INTERVENTIONS:  - Monitor vital signs, rhythm, and trends  - Monitor for bleeding, hypotension and signs of decreased cardiac output  - Evaluate effectiveness of vasoactive medications to optimize hemodynamic stability  - Monitor arterial and/or venous puncture sites for bleeding and/or hematoma  - Assess quality of pulses, skin color and temperature  - Assess for signs of decreased coronary artery perfusion - ex. Angina  - Evaluate fluid balance, assess for edema, trend weights  Outcome: Progressing  Goal: Absence of cardiac arrhythmias or at baseline  Description: INTERVENTIONS:  - Continuous cardiac monitoring, monitor vital signs, obtain 12 lead EKG if indicated  - Evaluate effectiveness of antiarrhythmic and heart rate control medications as ordered  - Initiate emergency measures for life threatening arrhythmias  - Monitor electrolytes and  administer replacement therapy as ordered  Outcome: Progressing     Problem: GASTROINTESTINAL - ADULT  Goal: Minimal or absence of nausea and vomiting  Description: INTERVENTIONS:  - Maintain adequate hydration with IV or PO as ordered and tolerated  - Nasogastric tube to low intermittent suction as ordered  - Evaluate effectiveness of ordered antiemetic medications  - Provide nonpharmacologic comfort measures as appropriate  - Advance diet as tolerated, if ordered  - Obtain nutritional consult as needed  - Evaluate fluid balance  Outcome: Progressing  Goal: Maintains or returns to baseline bowel function  Description: INTERVENTIONS:  - Assess bowel function  - Maintain adequate hydration with IV or PO as ordered and tolerated  - Evaluate effectiveness of GI medications  - Encourage mobilization and activity  - Obtain nutritional consult as needed  - Establish a toileting routine/schedule  - Consider collaborating with pharmacy to review patient's medication profile  Outcome: Progressing  Goal: Maintains adequate nutritional intake (undernourished)  Description: INTERVENTIONS:  - Monitor percentage of each meal consumed  - Identify factors contributing to decreased intake, treat as appropriate  - Assist with meals as needed  - Monitor I&O, WT and lab values  - Obtain nutritional consult as needed  - Optimize oral hygiene and moisture  - Encourage food from home; allow for food preferences  - Enhance eating environment  Outcome: Progressing  Goal: Achieves appropriate nutritional intake (bariatric)  Description: INTERVENTIONS:  - Monitor for over-consumption  - Identify factors contributing to increased intake, treat as appropriate  - Monitor I&O, WT and lab values  - Obtain nutritional consult as needed  - Evaluate psychosocial factors contributing to over-consumption  Outcome: Progressing     Problem: METABOLIC/FLUID AND ELECTROLYTES - ADULT  Goal: Glucose maintained within prescribed range  Description:  INTERVENTIONS:  - Monitor Blood Glucose as ordered  - Assess for signs and symptoms of hyperglycemia and hypoglycemia  - Administer ordered medications to maintain glucose within target range  - Assess barriers to adequate nutritional intake and initiate nutrition consult as needed  - Instruct patient on self management of diabetes  Outcome: Progressing  Goal: Electrolytes maintained within normal limits  Description: INTERVENTIONS:  - Monitor labs and rhythm and assess patient for signs and symptoms of electrolyte imbalances  - Administer electrolyte replacement as ordered  - Monitor response to electrolyte replacements, including rhythm and repeat lab results as appropriate  - Fluid restriction as ordered  - Instruct patient on fluid and nutrition restrictions as appropriate  Outcome: Progressing  Goal: Hemodynamic stability and optimal renal function maintained  Description: INTERVENTIONS:  - Monitor labs and assess for signs and symptoms of volume excess or deficit  - Monitor intake, output and patient weight  - Monitor urine specific gravity, serum osmolarity and serum sodium as indicated or ordered  - Monitor response to interventions for patient's volume status, including labs, urine output, blood pressure (other measures as available)  - Encourage oral intake as appropriate  - Instruct patient on fluid and nutrition restrictions as appropriate  Outcome: Progressing     Problem: HEMATOLOGIC - ADULT  Goal: Maintains hematologic stability  Description: INTERVENTIONS  - Assess for signs and symptoms of bleeding or hemorrhage  - Monitor labs and vital signs for trends  - Administer supportive blood products/factors, fluids and medications as ordered and appropriate  - Administer supportive blood products/factors as ordered and appropriate  Outcome: Progressing  Goal: Free from bleeding injury  Description: (Example usage: patient with low platelets)  INTERVENTIONS:  - Avoid intramuscular injections, enemas and  rectal medication administration  - Ensure safe mobilization of patient  - Hold pressure on venipuncture sites to achieve adequate hemostasis  - Assess for signs and symptoms of internal bleeding  - Monitor lab trends  - Patient is to report abnormal signs of bleeding to staff  - Avoid use of toothpicks and dental floss  - Use electric shaver for shaving  - Use soft bristle tooth brush  - Limit straining and forceful nose blowing  Outcome: Progressing

## 2024-01-10 NOTE — DISCHARGE SUMMARY
[facility]     DISCHARGE SUMMARY     Marissa Delaney Patient Status:  Inpatient    1960 MRN [id number]   Location [facility] 5SW/SE Attending Kushal Clay MD   Hosp Day # 1 PCP Alyx Olivo MD     DATE OF ADMISSION: 2024  DATE OF DISCHARGE:  01/10/24  DISPOSITION: home  CONDITION ON DISCHARGE: good    DISCHARGE DIAGNOSES:  Peptic ulcer disease  Alcohol abuse  Acute blood loss anemia  Dehydration  Acute kidney injury  Hypercalcemia    HISTORY OF PRESENT ILLNESS (COPIED FROM ADMISSION H&P)  The patient is a 63-year-old  female who came in today to the emergency department for evaluation of epigastric discomfort, gait imbalance, and multiple falls at home.  CBC showed hemoglobin of 9.4, which has dropped from baseline of 12.7 in 2023.  Chemistry showed sodium 133, BUN and creatinine of 34 and 1.77, calcium 13.2, magnesium 1.5.  CT scan of the brain and MRI scan of the brain both showed no acute findings.  CT scan of the abdomen still pending.  PTH and alcohol level are still pending.  EKG today showed normal sinus rhythm.     HOSPITAL COURSE:  Patient was admitted, started on IV fluids.  Her hemoglobin dropped a little bit further but this was probably delusional.  Her hemoglobin was significantly below her baseline.  There is high suspicion for GI losses despite negative fecal occult blood.  She has a history of peptic ulcer disease.  GI was consulted and she was taken for EGD which showed large gastric ulcer which was not actively bleeding.  Patient started on twice daily PPI.  She was hemodynamically stable and cleared by GI for discharge.  She was counseled extensively on the need for alcohol cessation.  She understands to follow-up with her primary care physician and gastroenterologist as an outpatient and have repeat hemoglobin checked in the next couple of weeks.    Patient did not exhibit any alcohol withdrawal symptoms during her admission.  She absolutely  refused to stay in the hospital any longer and demanded discharge and 1/10/2024.  She was decisional.    Patient understands return to the emergency room for increased pain, fever, discharge, shortness of breath, chest pain, new neurologic symptoms, other concerning symptoms.    PHYSICAL EXAM:  Temp:  [98 °F (36.7 °C)-98.2 °F (36.8 °C)] 98 °F (36.7 °C)  Pulse:  [] 88  Resp:  [16-23] 19  BP: (106-148)/(59-80) 131/72  SpO2:  [92 %-99 %] 98 %  Gen: A+Ox3.  No distress.   HEENT: NCAT, neck supple, no carotid bruit.  CV: RRR, S1S2, and intact distal pulses. No gallop, rub, murmur.  Pulm: Effort and breath sounds normal. No distress, wheezes, rales, rhonchi.  Abd: Soft, NTND, BS normal, no mass, no HSM, no rebound/guarding.   Neuro: Normal reflexes, CN. Sensory/motor exams grossly normal deficit. Coordination  and gait normal.   MS: No joint effusions.  No peripheral edema.  Skin: Skin is warm and dry. No rashes, erythema, diaphoresis.   Psych: Normal mood and affect. Behavior and judgment normal.     DISCHARGE MEDICATIONS     Discharge Medications        START taking these medications        Instructions Prescription details   escitalopram 5 MG Tabs  Commonly known as: Lexapro      Take 1 tablet (5 mg total) by mouth nightly.   Quantity: 30 tablet  Refills: 0     pantoprazole 40 MG Tbec  Commonly known as: Protonix      Take 1 tablet (40 mg total) by mouth 2 (two) times daily before meals.   Stop taking on: February 9, 2024  Quantity: 60 tablet  Refills: 0            CONTINUE taking these medications        Instructions Prescription details   amLODIPine 10 MG Tabs  Commonly known as: Norvasc      Take 1 tablet (10 mg total) by mouth daily.   Quantity: 90 tablet  Refills: 3     levothyroxine 112 MCG Tabs  Commonly known as: Synthroid      Take 1 tablet (112 mcg total) by mouth before breakfast.   Quantity: 90 tablet  Refills: 3     Losartan Potassium-HCTZ 100-12.5 MG Tabs  Commonly known as: HYZAAR      Take 1  tablet by mouth daily.   Quantity: 90 tablet  Refills: 3     rosuvastatin 5 MG Tabs  Commonly known as: Crestor      Take 1 tablet (5 mg total) by mouth nightly.   Quantity: 90 tablet  Refills: 3               Where to Get Your Medications        These medications were sent to [id number] - [place], [place] - [address] AT [facility], [phone], [phone]  [address] [facility] [address]      Phone: [phone]   escitalopram 5 MG Tabs  pantoprazole 40 MG [facility]         CONSULTANTS  Consultants         Provider   Role Specialty     Fabio Hackett MD  Consulting Physician Psychiatry     Ma, Nasra Cotto MD  Consulting Physician GASTROENTEROLOGY     [facility], MD Trina  Consulting Physician ENDOCRINOLOGY            FOLLOW UP:  Alyx Olivo MD  [address] [facility] [address]  [address]  [place] [address]  [phone]    Follow up in 1 week(s)  Post Discharge Followup    Tim Raymond MD  [address] [facility] [address]  [facility] [id number] [phone]    Follow up in 2 week(s)  Post Discharge Followup    The above plan and follow-up instructions were reviewed with the patient and they verbalized understanding and agreement.  They understand to return to the emergency room for any concerning signs or symptoms.  Greater than 30 minutes spent on discharge.  -----------------------    Hospital Discharge Diagnoses:  Peptic ulcer disease    Lace+ Score: 34  59-90 High Risk  29-58 Medium Risk  0-28   Low Risk.    TCM Follow-Up Recommendation:  LACE 29-58: Moderate Risk of readmission after discharge from the hospital.

## 2024-01-10 NOTE — ANESTHESIA POSTPROCEDURE EVALUATION
Patient: Marissa Delaney    Procedure Summary       Date: 01/10/24 Room / Location: [facility] ENDOSCOPY 01 / [facility] ENDOSCOPY    Anesthesia Start: 1447 Anesthesia Stop:     Procedure: ESOPHAGOGASTRODUODENOSCOPY (EGD) Diagnosis: (gastric ulcer, duodenal ulcer)    Surgeons: Nasra Mary MD Anesthesiologist: Carole Andrea CRNA    Anesthesia Type: general ASA Status: 2            Anesthesia Type: general    Vitals Value Taken Time   /63 01/10/24 1502   Temp  01/10/24 1504   Pulse 82 01/10/24 1504   Resp 23 01/10/24 1504   SpO2 95 % 01/10/24 1504   Vitals shown include unfiled device data.    [facility] AN Post Evaluation:   Patient Evaluated in PACU  Patient Participation: complete - patient participated  Level of Consciousness: awake and alert  Pain Score: 0  Pain Management: adequate  Airway Patency:patent  Dental exam unchanged from preop  Yes    Cardiovascular Status: hemodynamically stable  Respiratory Status: room air  Postoperative Hydration acceptable      Carole Andrea CRNA  1/10/2024 3:04 PM

## 2024-01-10 NOTE — PLAN OF CARE
Patient discharge back home with . Discharge education and AVS provided to patient. PIV removed. Bedside belongings packed up and taken with at time of discharge. Escorted downstairs via wheelchair by PCT on duty. Needs met.     Problem: Patient Centered Care  Goal: Patient preferences are identified and integrated in the patient's plan of care  Description: Interventions:  - What would you like us to know as we care for you?   - Provide timely, complete, and accurate information to patient/family  - Incorporate patient and family knowledge, values, beliefs, and cultural backgrounds into the planning and delivery of care  - Encourage patient/family to participate in care and decision-making at the level they choose  - Honor patient and family perspectives and choices  1/10/2024 1647 by Christiane Judd RN  Outcome: Adequate for Discharge  1/10/2024 1045 by Christiane Judd RN  Outcome: Progressing     Problem: Patient/Family Goals  Goal: Patient/Family Long Term Goal  Description: Patient's Long Term Goal:   Interventions:    - See additional Care Plan goals for specific interventions  1/10/2024 1647 by Christiane Judd RN  Outcome: Adequate for Discharge  1/10/2024 1045 by Christiane Judd RN  Outcome: Progressing  Goal: Patient/Family Short Term Goal  Description: Patient's Short Term Goal:    Interventions:   -  - See additional Care Plan goals for specific interventions  1/10/2024 1647 by Christiane Judd RN  Outcome: Adequate for Discharge  1/10/2024 1045 by Christiane Judd RN  Outcome: Progressing     Problem: CARDIOVASCULAR - ADULT  Goal: Maintains optimal cardiac output and hemodynamic stability  Description: INTERVENTIONS:  - Monitor vital signs, rhythm, and trends  - Monitor for bleeding, hypotension and signs of decreased cardiac output  - Evaluate effectiveness of vasoactive medications to optimize hemodynamic stability  - Monitor arterial and/or venous puncture sites for bleeding and/or  hematoma  - Assess quality of pulses, skin color and temperature  - Assess for signs of decreased coronary artery perfusion - ex. Angina  - Evaluate fluid balance, assess for edema, trend weights  1/10/2024 1647 by Christiane Judd RN  Outcome: Adequate for Discharge  1/10/2024 1045 by Christiane Judd RN  Outcome: Progressing  Goal: Absence of cardiac arrhythmias or at baseline  Description: INTERVENTIONS:  - Continuous cardiac monitoring, monitor vital signs, obtain 12 lead EKG if indicated  - Evaluate effectiveness of antiarrhythmic and heart rate control medications as ordered  - Initiate emergency measures for life threatening arrhythmias  - Monitor electrolytes and administer replacement therapy as ordered  1/10/2024 1647 by Christiane Judd, RN  Outcome: Adequate for Discharge  1/10/2024 1045 by Christiane Judd RN  Outcome: Progressing     Problem: GASTROINTESTINAL - ADULT  Goal: Minimal or absence of nausea and vomiting  Description: INTERVENTIONS:  - Maintain adequate hydration with IV or PO as ordered and tolerated  - Nasogastric tube to low intermittent suction as ordered  - Evaluate effectiveness of ordered antiemetic medications  - Provide nonpharmacologic comfort measures as appropriate  - Advance diet as tolerated, if ordered  - Obtain nutritional consult as needed  - Evaluate fluid balance  1/10/2024 1647 by Christiane Judd RN  Outcome: Adequate for Discharge  1/10/2024 1045 by Christiane Judd RN  Outcome: Progressing  Goal: Maintains or returns to baseline bowel function  Description: INTERVENTIONS:  - Assess bowel function  - Maintain adequate hydration with IV or PO as ordered and tolerated  - Evaluate effectiveness of GI medications  - Encourage mobilization and activity  - Obtain nutritional consult as needed  - Establish a toileting routine/schedule  - Consider collaborating with pharmacy to review patient's medication profile  1/10/2024 1647 by Christiane Judd, RN  Outcome: Adequate  for Discharge  1/10/2024 1045 by Christiane Judd RN  Outcome: Progressing  Goal: Maintains adequate nutritional intake (undernourished)  Description: INTERVENTIONS:  - Monitor percentage of each meal consumed  - Identify factors contributing to decreased intake, treat as appropriate  - Assist with meals as needed  - Monitor I&O, WT and lab values  - Obtain nutritional consult as needed  - Optimize oral hygiene and moisture  - Encourage food from home; allow for food preferences  - Enhance eating environment  1/10/2024 1647 by Christiane Judd RN  Outcome: Adequate for Discharge  1/10/2024 1045 by Christiane Judd RN  Outcome: Progressing  Goal: Achieves appropriate nutritional intake (bariatric)  Description: INTERVENTIONS:  - Monitor for over-consumption  - Identify factors contributing to increased intake, treat as appropriate  - Monitor I&O, WT and lab values  - Obtain nutritional consult as needed  - Evaluate psychosocial factors contributing to over-consumption  1/10/2024 1647 by Christiane Judd RN  Outcome: Adequate for Discharge  1/10/2024 1045 by Christiane Judd RN  Outcome: Progressing     Problem: METABOLIC/FLUID AND ELECTROLYTES - ADULT  Goal: Glucose maintained within prescribed range  Description: INTERVENTIONS:  - Monitor Blood Glucose as ordered  - Assess for signs and symptoms of hyperglycemia and hypoglycemia  - Administer ordered medications to maintain glucose within target range  - Assess barriers to adequate nutritional intake and initiate nutrition consult as needed  - Instruct patient on self management of diabetes  1/10/2024 1647 by Christiane Judd RN  Outcome: Adequate for Discharge  1/10/2024 1045 by Christiane Judd RN  Outcome: Progressing  Goal: Electrolytes maintained within normal limits  Description: INTERVENTIONS:  - Monitor labs and rhythm and assess patient for signs and symptoms of electrolyte imbalances  - Administer electrolyte replacement as ordered  - Monitor  response to electrolyte replacements, including rhythm and repeat lab results as appropriate  - Fluid restriction as ordered  - Instruct patient on fluid and nutrition restrictions as appropriate  1/10/2024 1647 by Christiane Judd RN  Outcome: Adequate for Discharge  1/10/2024 1045 by Christiane Judd RN  Outcome: Progressing  Goal: Hemodynamic stability and optimal renal function maintained  Description: INTERVENTIONS:  - Monitor labs and assess for signs and symptoms of volume excess or deficit  - Monitor intake, output and patient weight  - Monitor urine specific gravity, serum osmolarity and serum sodium as indicated or ordered  - Monitor response to interventions for patient's volume status, including labs, urine output, blood pressure (other measures as available)  - Encourage oral intake as appropriate  - Instruct patient on fluid and nutrition restrictions as appropriate  1/10/2024 1647 by Christiane Judd RN  Outcome: Adequate for Discharge  1/10/2024 1045 by Christiane Judd RN  Outcome: Progressing     Problem: HEMATOLOGIC - ADULT  Goal: Maintains hematologic stability  Description: INTERVENTIONS  - Assess for signs and symptoms of bleeding or hemorrhage  - Monitor labs and vital signs for trends  - Administer supportive blood products/factors, fluids and medications as ordered and appropriate  - Administer supportive blood products/factors as ordered and appropriate  1/10/2024 1647 by Christiane Judd RN  Outcome: Adequate for Discharge  1/10/2024 1045 by Christiane Judd RN  Outcome: Progressing  Goal: Free from bleeding injury  Description: (Example usage: patient with low platelets)  INTERVENTIONS:  - Avoid intramuscular injections, enemas and rectal medication administration  - Ensure safe mobilization of patient  - Hold pressure on venipuncture sites to achieve adequate hemostasis  - Assess for signs and symptoms of internal bleeding  - Monitor lab trends  - Patient is to report abnormal  signs of bleeding to staff  - Avoid use of toothpicks and dental floss  - Use electric shaver for shaving  - Use soft bristle tooth brush  - Limit straining and forceful nose blowing  1/10/2024 1647 by Christiane Judd, RN  Outcome: Adequate for Discharge  1/10/2024 1045 by Christiane Judd, RN  Outcome: Progressing

## 2024-01-10 NOTE — PRE-SEDATION ASSESSMENT
Physician Pre-Sedation Assessment    Pre-Sedation Assessment:    Sedation History: Previous Sedation with No Complications and Airway Assessed    Cardiac: normal S1, S2  Respiratory: breath sounds clear bilaterally   Abdomen: soft, BS (+), non-tender    ASA Classification: 3. Patient with severe systemic disease    Plan: mac Sedation

## 2024-01-10 NOTE — ANESTHESIA PREPROCEDURE EVALUATION
Anesthesia PreOp Note    HPI:     Marissa Delaney is a 63 year old female who presents for preoperative consultation requested by: Nasra Mary MD    Date of Surgery: 1/10/2024    Procedure(s):  ESOPHAGOGASTRODUODENOSCOPY (EGD)  Indication: epigastric pain    Relevant Problems   No relevant active problems       NPO:  Last Liquid Consumption Date: 01/10/24  Last Liquid Consumption Time: 0900  Last Solid Consumption Date: 01/09/24  Last Solid Consumption Time: 1900  Last Liquid Consumption Date: 01/10/24          History Review:  Patient Active Problem List    Diagnosis Date Noted    Epigastric pain 01/10/2024    Dizziness 01/09/2024    Anemia, unspecified type 01/09/2024    Hypercalcemia 01/09/2024    Renal insufficiency 01/09/2024    Acute gastric ulcer without hemorrhage or perforation 01/09/2024    History of GI bleed 08/12/2021    Alcohol abuse 03/08/2016    Hyperlipidemia 08/25/2015    Essential hypertension     Hypothyroidism     Osteoarthritis of left hip        Past Medical History:   Diagnosis Date    Broken wrist 2012    per [employer]:  \"Management:  orif left wrist\"    Disorder of thyroid     Duodenal ulcer     GI bleed     High blood pressure     High cholesterol     Hypothyroidism     Osteoarthritis of left hip 2010    Renal insufficiency 1/9/2024    Unspecified essential hypertension     Visual impairment        Past Surgical History:   Procedure Laterality Date    COLON SURGERY  03/01/2018    ELECTROCARDIOGRAM, COMPLETE  05-    Scanned to Media Tab - Date of Service 05-    FRACTURE SURGERY      HIP REPLACEMENT SURGERY Left 2010    left hip replacement    HIP REPLACEMENT SURGERY Right 2013    right hip replacement    OTHER Right 09/28/15    ORIF patella    TOTAL HIP REPLACEMENT      WRIST FRACTURE SURGERY Left 2012       Facility-Administered Medications Prior to Admission   Medication Dose Route Frequency Provider Last Rate Last Admin    cyanocobalamin (VITAMIN B12) 1000 MCG/ML injection  1,000 mcg  1,000 mcg Intramuscular Q30 Days Alyx Olivo MD         Medications Prior to Admission   Medication Sig Dispense Refill Last Dose    rosuvastatin 5 MG Oral Tab Take 1 tablet (5 mg total) by mouth nightly. 90 tablet 3 1/9/2024    amLODIPine 10 MG Oral Tab Take 1 tablet (10 mg total) by mouth daily. 90 tablet 3 1/8/2024    Losartan Potassium-HCTZ 100-12.5 MG Oral Tab Take 1 tablet by mouth daily. 90 tablet 3 1/9/2024    levothyroxine 112 MCG Oral Tab Take 1 tablet (112 mcg total) by mouth before breakfast. (Patient taking differently: Take 75 mcg by mouth before breakfast.) 90 tablet 3 1/9/2024     Current Facility-Administered Medications Ordered in Epic   Medication Dose Route Frequency Provider Last Rate Last Admin    magnesium oxide (Mag-Ox) tab 800 mg  800 mg Oral Once El Palm MD        pantoprazole (Protonix) 40 mg in sodium chloride 0.9% PF 10 mL IV push  40 mg Intravenous Q12H Nasra Mary MD        escitalopram (Lexapro) tab 5 mg  5 mg Oral Nightly Fabio Hackett MD        QUEtiapine (SEROquel) tab 50 mg  50 mg Oral Nightly Fabio Hackett MD        diazepam (Valium) 5 mg/mL injection 2.5 mg  2.5 mg Intravenous Q6H PRN Fabio Hackett MD        sodium chloride 0.9% infusion   Intravenous Continuous Kushal Clay MD 83 mL/hr at 01/10/24 1010 New Bag at 01/10/24 1010    acetaminophen (Tylenol Extra Strength) tab 500 mg  500 mg Oral Q4H PRN El Palm MD        ondansetron (Zofran) 4 MG/2ML injection 4 mg  4 mg Intravenous Q6H PRN El Palm MD        prochlorperazine (Compazine) 10 MG/2ML injection 5 mg  5 mg Intravenous Q8H PRN El Palm MD        morphINE PF 2 MG/ML injection 1 mg  1 mg Intravenous Q2H PRN El Palm MD        Or    morphINE PF 2 MG/ML injection 2 mg  2 mg Intravenous Q2H PRN El Palm MD        Or    morphINE PF 4 MG/ML injection 4 mg  4 mg Intravenous Q2H PRN El Palm MD        LORazepam (Ativan) tab 1 mg  1 mg  Oral Q1H PRN El Palm MD        Or    LORazepam (Ativan) tab 2 mg  2 mg Oral Q1H PRN El Palm MD        metoprolol tartrate (Lopressor) tab 25 mg  25 mg Oral BID PRN El Palm MD        thiamine (Vitamin B1) tab 100 mg  100 mg Oral Daily El Palm MD   100 mg at 01/10/24 0844    multivitamin with minerals (Thera M Plus) tab 1 tablet  1 tablet Oral Daily El Palm MD   1 tablet at 01/10/24 0844    folic acid (Folvite) tab 1 mg  1 mg Oral Daily El Palm MD   1 mg at 01/10/24 0844     No current [facility]-ordered outpatient medications on file.       No Known Allergies    Family History   Problem Relation Age of Onset    Arthritis Mother     Heart Disease Father         Valve repair     Social History     Socioeconomic History    Marital status:    Tobacco Use    Smoking status: Never    Smokeless tobacco: Never   Vaping Use    Vaping Use: Never used   Substance and Sexual Activity    Alcohol use: Yes     Alcohol/week: 11.0 standard drinks of alcohol     Types: 11 Glasses of wine per week     Comment: few days a week    Drug use: No   Other Topics Concern    Caffeine Concern Yes     Comment: Coffee, occasionally       Available pre-op labs reviewed.  Lab Results   Component Value Date    WBC 8.3 01/10/2024    RBC 2.77 (L) 01/10/2024    HGB 8.7 (L) 01/10/2024    HCT 26.8 (L) 01/10/2024    MCV 96.8 01/10/2024    MCH 31.4 01/10/2024    MCHC 32.5 01/10/2024    RDW 13.4 01/10/2024    .0 01/10/2024     Lab Results   Component Value Date     01/10/2024    K 3.2 (L) 01/10/2024     01/10/2024    CO2 27.0 01/10/2024    BUN 25 (H) 01/10/2024    CREATSERUM 1.48 (H) 01/10/2024    GLU 89 01/10/2024    PGLU 132 (H) 01/09/2024    CA 11.8 (H) 01/10/2024          Vital Signs:  Body mass index is 24.83 kg/m².   height is 1.803 m (5' 11\") and weight is 80.7 kg (178 lb). Her oral temperature is 98 °F (36.7 °C). Her blood pressure is 122/70 and her pulse is 87. Her  respiration is 17 and oxygen saturation is 96%.   Vitals:    01/10/24 0500 01/10/24 0633 01/10/24 0830 01/10/24 1030   BP:  121/76 124/71 122/70   Pulse: 83 97 85 87   Resp:  18 17    Temp:   98 °F (36.7 °C)    TempSrc:   Oral    SpO2:  98% 96%    Weight:       Height:            Anesthesia Evaluation     Patient summary reviewed and Nursing notes reviewed    No history of anesthetic complications   Airway   Mallampati: II  TM distance: >3 FB  Neck ROM: full  Dental          Pulmonary     breath sounds clear to auscultation    ROS comment: Snores but denies CHERIE  Cardiovascular   (+) hypertension    Rhythm: regular  Rate: normal  ROS comment: Denies any recent chest pain or sob    Neuro/Psych - negative ROS     GI/Hepatic/Renal    (+) chronic renal disease CRI    Comments: Current h/h 8.7/26.8, plts 424  H/o gastric ulcer   (+) ETOH - 1 bottle wine daily. Patient reports getting the shakes sometimes when she doesn't drink; denies illicit drug use    Endo/Other    (+) hypothyroidism, arthritis    Comments: 1. Hypercalcemia  - Likely secondary to Tums ingestion    Abdominal                  Anesthesia Plan:   ASA:  2  Plan:   General  Informed Consent Plan and Risks Discussed With:  Patient      I have informed Marissa Rhett and/or legal guardian or family member of the nature of the anesthetic plan, benefits, risks including possible dental damage if relevant, major complications, and any alternative forms of anesthetic management.   All of the patient's questions were answered to the best of my ability. The patient desires the anesthetic management as planned.  Carole Andrea CRNA  1/10/2024 1:03 PM  Present on Admission:  **None**

## 2024-01-10 NOTE — INTERVAL H&P NOTE
Pre-op Diagnosis: epigastric pain    The above referenced H&P was reviewed by Nasra Cotto Ma, MD on 1/10/2024, the patient was examined and no significant changes have occurred in the patient's condition since the H&P was performed.  I discussed with the patient and/or legal representative the potential benefits, risks and side effects of this procedure; the likelihood of the patient achieving goals; and potential problems that might occur during recuperation.  I discussed reasonable alternatives to the procedure, including risks, benefits and side effects related to the alternatives and risks related to not receiving this procedure.  We will proceed with procedure as planned.

## 2024-01-10 NOTE — ED QUICK NOTES
Rounding Completed    Plan of Care reviewed. Waiting for bed assignment for admission.  Elimination needs assessed.  Provided TV remote.    Bed is locked and in lowest position. Call light within reach.

## 2024-01-10 NOTE — CONSULTS
[facility]   Gastroenterology Consultation Note    Marissa Delaney  Patient Status:    Inpatient  Date of Admission:         1/9/2024, Hospital day #1  Attending:   Kushal Clay MD  PCP:     Alyx Olivo MD    Reason for Consultation:  Epigastric pain    History of Present Illness:  Marissa Delaney is a a(n) 63 year old female w/ a history of chronic back pain on NSAIDs, hypertension, complex ulcer disease including history of duodenal and gastric ulcer bleeding, pyloric stenosis status post dilation, status post pyloroplasty, status post spontaneous perforation of gastric ulcer with surgical repair, who presents with epigastric pain.  States for the past week or so she has noted epigastric pain.  She takes several doses of ibuprofen a day chronically for back pain.  No nausea or vomiting.  No melena or hematochezia.  Last EGD was during inpatient admission in 2018 at which time she had an actively bleeding duodenal bulb ulcer that was treated endoscopically.  She has not followed up since then.    Significant alcohol use.  States she drinks 1 bottle of wine per day, last drink was yesterday.    History:  Past Medical History:   Diagnosis Date    Broken wrist 2012    per [employer]:  \"Management:  orif left wrist\"    Disorder of thyroid     Duodenal ulcer     GI bleed     High blood pressure     High cholesterol     Hypothyroidism     Osteoarthritis of left hip 2010    Renal insufficiency 1/9/2024    Unspecified essential hypertension     Visual impairment      Past Surgical History:   Procedure Laterality Date    COLON SURGERY  03/01/2018    ELECTROCARDIOGRAM, COMPLETE  05-    Scanned to Media Tab - Date of Service 05-    FRACTURE SURGERY      HIP REPLACEMENT SURGERY Left 2010    left hip replacement    HIP REPLACEMENT SURGERY Right 2013    right hip replacement    OTHER Right 09/28/15    ORIF patella    TOTAL HIP REPLACEMENT      WRIST FRACTURE SURGERY Left 2012     Family History   Problem  Relation Age of Onset    Arthritis Mother     Heart Disease Father         Valve repair      reports that she has never smoked. She has never used smokeless tobacco. She reports current alcohol use of about 11.0 standard drinks of alcohol per week. She reports that she does not use drugs.    Allergies:  No Known Allergies    Medications:    Current Facility-Administered Medications:     magnesium oxide (Mag-Ox) tab 800 mg, 800 mg, Oral, Once    pantoprazole (Protonix) 40 mg in sodium chloride 0.9% PF 10 mL IV push, 40 mg, Intravenous, Q12H    escitalopram (Lexapro) tab 5 mg, 5 mg, Oral, Nightly    QUEtiapine (SEROquel) tab 50 mg, 50 mg, Oral, Nightly    diazepam (Valium) 5 mg/mL injection 2.5 mg, 2.5 mg, Intravenous, Q6H PRN    sodium chloride 0.9% infusion, , Intravenous, Continuous    acetaminophen (Tylenol Extra Strength) tab 500 mg, 500 mg, Oral, Q4H PRN    ondansetron (Zofran) 4 MG/2ML injection 4 mg, 4 mg, Intravenous, Q6H PRN    prochlorperazine (Compazine) 10 MG/2ML injection 5 mg, 5 mg, Intravenous, Q8H PRN    morphINE PF 2 MG/ML injection 1 mg, 1 mg, Intravenous, Q2H PRN **OR** morphINE PF 2 MG/ML injection 2 mg, 2 mg, Intravenous, Q2H PRN **OR** morphINE PF 4 MG/ML injection 4 mg, 4 mg, Intravenous, Q2H PRN    LORazepam (Ativan) tab 1 mg, 1 mg, Oral, Q1H PRN **OR** LORazepam (Ativan) tab 2 mg, 2 mg, Oral, Q1H PRN    metoprolol tartrate (Lopressor) tab 25 mg, 25 mg, Oral, BID PRN    thiamine (Vitamin B1) tab 100 mg, 100 mg, Oral, Daily    multivitamin with minerals (Thera M Plus) tab 1 tablet, 1 tablet, Oral, Daily    folic acid (Folvite) tab 1 mg, 1 mg, Oral, Daily  Facility-Administered Medications Prior to Admission   Medication Dose Route Frequency Provider Last Rate Last Admin    cyanocobalamin (VITAMIN B12) 1000 MCG/ML injection 1,000 mcg  1,000 mcg Intramuscular Q30 Days Alyx Olivo MD         Medications Prior to Admission   Medication Sig Dispense Refill Last Dose    rosuvastatin 5 MG Oral  Tab Take 1 tablet (5 mg total) by mouth nightly. 90 tablet 3 1/9/2024    amLODIPine 10 MG Oral Tab Take 1 tablet (10 mg total) by mouth daily. 90 tablet 3 1/8/2024    Losartan Potassium-HCTZ 100-12.5 MG Oral Tab Take 1 tablet by mouth daily. 90 tablet 3 1/9/2024    levothyroxine 112 MCG Oral Tab Take 1 tablet (112 mcg total) by mouth before breakfast. (Patient taking differently: Take 75 mcg by mouth before breakfast.) 90 tablet 3 1/9/2024       Review of Systems:  CONSTITUTIONAL:  negative for fevers, rigors  EYES:  negative for diplopia   RESPIRATORY:  negative for severe shortness of breath  CARDIOVASCULAR:  negative for crushing sub-sternal chest pain  GASTROINTESTINAL:  see HPI  GENITOURINARY:  negative for dysuria or gross hematuria  INTEGUMENT/BREAST:  SKIN:  negative for jaundice   ALLERGIC/IMMUNOLOGIC:  negative for hay fever  ENDOCRINE:  negative for cold intolerance and heat intolerance  MUSCULOSKELETAL:  negative for joint effusion/severe erythema  NEURO: negative for dizziness or loss of conscious or paresthesias  BEHAVIOR/PSYCH:  negative for psychotic behavior    Physical Exam:    Blood pressure 124/71, pulse 85, temperature 98 °F (36.7 °C), temperature source Oral, resp. rate 17, height 5' 11\" (1.803 m), weight 178 lb (80.7 kg), SpO2 96%, not currently breastfeeding. Body mass index is 24.83 kg/m².    General: awake, alert and oriented, no acute distress  HEENT: moist mucus membranes  PULM: no conversational dyspnea  CARDIOVASCULAR: regular rate and rhythm, the extremities are warm and well perfused  GI: soft, non-tender, non-distended, + BS, no rebound/guarding   EXTREMITIES: no edema, moving all extremities  SKIN: no visible rash  NEURO: appropriate and interactive    Laboratory Data:  Lab Results   Component Value Date    WBC 8.3 01/10/2024    HGB 8.7 01/10/2024    HCT 26.8 01/10/2024    .0 01/10/2024    CREATSERUM 1.48 01/10/2024    BUN 25 01/10/2024     01/10/2024    K 3.2  01/10/2024     01/10/2024    CO2 27.0 01/10/2024    GLU 89 01/10/2024    CA 11.8 01/10/2024    ALB 4.1 01/09/2024    ALKPHO 71 01/09/2024    BILT 0.5 01/09/2024    TP 7.2 01/09/2024    AST 22 01/09/2024    ALT 19 01/09/2024    TSH 1.306 01/09/2024    LIP 32 01/09/2024    MG 1.5 01/09/2024    ETOH <3 01/09/2024       Imaging:  CT ABDOMEN PELVIS IV CONTRAST, NO ORAL (ER)    Result Date: 1/9/2024  CONCLUSION:  1. There is a suspected large 2.7 cm gastric ulcer along the dependent gastric body/greater curvature.  Mild surrounding inflammatory stranding that extends to the pancreatic tail.  No adjacent free intraperitoneal air or well-defined/drainable intra-abdominal collection to suggest perforation or abscess.  Gastroenterology evaluation is suggested with strong consideration of EGD correlation.  Less likely that findings relate to pancreatitis at the pancreatic tail, but request correlation with serum lipase levels. 2. Stable postoperative changes at the distal stomach. 3. Stable mildly enlarged gastrohepatic ligament lymph nodes, which may be reactive. 4. Fatty liver. 5. Colonic diverticulosis.  No CT evidence of acute diverticulitis. 6. Small 1.7 cm simple-appearing left ovarian cyst is unchanged since comparison CT from December, 2022. 7. Circumferential urinary bladder wall thickening, which may relate to incomplete distention or cystitis.  If there are referable symptoms, urinalysis correlation is requested. 8. Bilateral hip arthroplasties are noted; resultant streak artifacts limit evaluation of the pelvic structures. 9. Stable chronic L1 and L2 vertebral body compression fractures. 10. Lesser incidental findings as above.   elm-remote  Dictated by (CST): Maverick Petersen MD on 1/09/2024 at 5:05 PM     Finalized by (CST): Maverick Petersen MD on 1/09/2024 at 5:14 PM          CT BRAIN OR HEAD (05604)    Result Date: 1/9/2024  CONCLUSION:  1. No acute intracranial hemorrhage or other acute intracranial  finding. 2. Small focus of soft tissue injury at the right lateral forehead.   elm-remote  Dictated by (CST): Maverick Petersen MD on 1/09/2024 at 5:00 PM     Finalized by (CST): Maverick Petersen MD on 1/09/2024 at 5:01 PM          MRI BRAIN WO ACUTE (3) SEQUENCE (CPT=70551)    Result Date: 1/9/2024  CONCLUSION:  1. Limited ER protocol 3 sequence imaging of the brain was performed.  No acute intracranial abnormality. Specifically, no evidence of acute or early subacute infarction. 2. Mild nonspecific white matter changes throughout both cerebral hemispheres, which most likely relate to early sequelae of chronic microangiopathy. 3. Dependent left maxillary sinus retention cyst. 4. Lesser incidental findings as above.   elm-remote  Dictated by (CST): Maverick Petersen MD on 1/09/2024 at 3:55 PM     Finalized by (CST): Maverick Petersen MD on 1/09/2024 at 3:57 PM           Assessment & Plan   Marissa Delaney is a a(n) 63 year old female w/ a history of chronic back pain on NSAIDs, hypertension, ETOH abuse, complex ulcer disease including history of duodenal and gastric ulcer bleeding, pyloric stenosis status post dilation, status post pyloroplasty, status post spontaneous perforation of gastric ulcer with surgical repair, who presents with epigastric pain.  Hemoglobin 8.7, no overt GI bleeding.  CT abdomen pelvis shows a large gastric ulcer along the greater curvature, no imaging signs of perforation or abscess.  Most likely NSAID induced gastropathy.  There is hepatic steatosis of the liver, likely ETOH induced.     Recommend:  - PPI 40mg IV BID  - EGD today  - avoid nsaids  - ETOH cessation discussed     Nasra Mary MD  [facility]  1/10/2024    This note was partially prepared using Dragon Medical voice recognition dictation software. As a result, errors may occur. When identified, these errors have been corrected. While every attempt is made to correct errors during dictation, discrepancies may still exist.

## 2024-01-10 NOTE — H&P (VIEW-ONLY)
[facility]   Gastroenterology Consultation Note    Marissa Delaney  Patient Status:    Inpatient  Date of Admission:         1/9/2024, Hospital day #1  Attending:   Kushal Clay MD  PCP:     Alyx Olivo MD    Reason for Consultation:  Epigastric pain    History of Present Illness:  Marissa Delaney is a a(n) 63 year old female w/ a history of chronic back pain on NSAIDs, hypertension, complex ulcer disease including history of duodenal and gastric ulcer bleeding, pyloric stenosis status post dilation, status post pyloroplasty, status post spontaneous perforation of gastric ulcer with surgical repair, who presents with epigastric pain.  States for the past week or so she has noted epigastric pain.  She takes several doses of ibuprofen a day chronically for back pain.  No nausea or vomiting.  No melena or hematochezia.  Last EGD was during inpatient admission in 2018 at which time she had an actively bleeding duodenal bulb ulcer that was treated endoscopically.  She has not followed up since then.    Significant alcohol use.  States she drinks 1 bottle of wine per day, last drink was yesterday.    History:  Past Medical History:   Diagnosis Date    Broken wrist 2012    per [employer]:  \"Management:  orif left wrist\"    Disorder of thyroid     Duodenal ulcer     GI bleed     High blood pressure     High cholesterol     Hypothyroidism     Osteoarthritis of left hip 2010    Renal insufficiency 1/9/2024    Unspecified essential hypertension     Visual impairment      Past Surgical History:   Procedure Laterality Date    COLON SURGERY  03/01/2018    ELECTROCARDIOGRAM, COMPLETE  05-    Scanned to Media Tab - Date of Service 05-    FRACTURE SURGERY      HIP REPLACEMENT SURGERY Left 2010    left hip replacement    HIP REPLACEMENT SURGERY Right 2013    right hip replacement    OTHER Right 09/28/15    ORIF patella    TOTAL HIP REPLACEMENT      WRIST FRACTURE SURGERY Left 2012     Family History   Problem  Relation Age of Onset    Arthritis Mother     Heart Disease Father         Valve repair      reports that she has never smoked. She has never used smokeless tobacco. She reports current alcohol use of about 11.0 standard drinks of alcohol per week. She reports that she does not use drugs.    Allergies:  No Known Allergies    Medications:    Current Facility-Administered Medications:     magnesium oxide (Mag-Ox) tab 800 mg, 800 mg, Oral, Once    pantoprazole (Protonix) 40 mg in sodium chloride 0.9% PF 10 mL IV push, 40 mg, Intravenous, Q12H    escitalopram (Lexapro) tab 5 mg, 5 mg, Oral, Nightly    QUEtiapine (SEROquel) tab 50 mg, 50 mg, Oral, Nightly    diazepam (Valium) 5 mg/mL injection 2.5 mg, 2.5 mg, Intravenous, Q6H PRN    sodium chloride 0.9% infusion, , Intravenous, Continuous    acetaminophen (Tylenol Extra Strength) tab 500 mg, 500 mg, Oral, Q4H PRN    ondansetron (Zofran) 4 MG/2ML injection 4 mg, 4 mg, Intravenous, Q6H PRN    prochlorperazine (Compazine) 10 MG/2ML injection 5 mg, 5 mg, Intravenous, Q8H PRN    morphINE PF 2 MG/ML injection 1 mg, 1 mg, Intravenous, Q2H PRN **OR** morphINE PF 2 MG/ML injection 2 mg, 2 mg, Intravenous, Q2H PRN **OR** morphINE PF 4 MG/ML injection 4 mg, 4 mg, Intravenous, Q2H PRN    LORazepam (Ativan) tab 1 mg, 1 mg, Oral, Q1H PRN **OR** LORazepam (Ativan) tab 2 mg, 2 mg, Oral, Q1H PRN    metoprolol tartrate (Lopressor) tab 25 mg, 25 mg, Oral, BID PRN    thiamine (Vitamin B1) tab 100 mg, 100 mg, Oral, Daily    multivitamin with minerals (Thera M Plus) tab 1 tablet, 1 tablet, Oral, Daily    folic acid (Folvite) tab 1 mg, 1 mg, Oral, Daily  Facility-Administered Medications Prior to Admission   Medication Dose Route Frequency Provider Last Rate Last Admin    cyanocobalamin (VITAMIN B12) 1000 MCG/ML injection 1,000 mcg  1,000 mcg Intramuscular Q30 Days Alyx Olivo MD         Medications Prior to Admission   Medication Sig Dispense Refill Last Dose    rosuvastatin 5 MG Oral  Tab Take 1 tablet (5 mg total) by mouth nightly. 90 tablet 3 1/9/2024    amLODIPine 10 MG Oral Tab Take 1 tablet (10 mg total) by mouth daily. 90 tablet 3 1/8/2024    Losartan Potassium-HCTZ 100-12.5 MG Oral Tab Take 1 tablet by mouth daily. 90 tablet 3 1/9/2024    levothyroxine 112 MCG Oral Tab Take 1 tablet (112 mcg total) by mouth before breakfast. (Patient taking differently: Take 75 mcg by mouth before breakfast.) 90 tablet 3 1/9/2024       Review of Systems:  CONSTITUTIONAL:  negative for fevers, rigors  EYES:  negative for diplopia   RESPIRATORY:  negative for severe shortness of breath  CARDIOVASCULAR:  negative for crushing sub-sternal chest pain  GASTROINTESTINAL:  see HPI  GENITOURINARY:  negative for dysuria or gross hematuria  INTEGUMENT/BREAST:  SKIN:  negative for jaundice   ALLERGIC/IMMUNOLOGIC:  negative for hay fever  ENDOCRINE:  negative for cold intolerance and heat intolerance  MUSCULOSKELETAL:  negative for joint effusion/severe erythema  NEURO: negative for dizziness or loss of conscious or paresthesias  BEHAVIOR/PSYCH:  negative for psychotic behavior    Physical Exam:    Blood pressure 124/71, pulse 85, temperature 98 °F (36.7 °C), temperature source Oral, resp. rate 17, height 5' 11\" (1.803 m), weight 178 lb (80.7 kg), SpO2 96%, not currently breastfeeding. Body mass index is 24.83 kg/m².    General: awake, alert and oriented, no acute distress  HEENT: moist mucus membranes  PULM: no conversational dyspnea  CARDIOVASCULAR: regular rate and rhythm, the extremities are warm and well perfused  GI: soft, non-tender, non-distended, + BS, no rebound/guarding   EXTREMITIES: no edema, moving all extremities  SKIN: no visible rash  NEURO: appropriate and interactive    Laboratory Data:  Lab Results   Component Value Date    WBC 8.3 01/10/2024    HGB 8.7 01/10/2024    HCT 26.8 01/10/2024    .0 01/10/2024    CREATSERUM 1.48 01/10/2024    BUN 25 01/10/2024     01/10/2024    K 3.2  01/10/2024     01/10/2024    CO2 27.0 01/10/2024    GLU 89 01/10/2024    CA 11.8 01/10/2024    ALB 4.1 01/09/2024    ALKPHO 71 01/09/2024    BILT 0.5 01/09/2024    TP 7.2 01/09/2024    AST 22 01/09/2024    ALT 19 01/09/2024    TSH 1.306 01/09/2024    LIP 32 01/09/2024    MG 1.5 01/09/2024    ETOH <3 01/09/2024       Imaging:  CT ABDOMEN PELVIS IV CONTRAST, NO ORAL (ER)    Result Date: 1/9/2024  CONCLUSION:  1. There is a suspected large 2.7 cm gastric ulcer along the dependent gastric body/greater curvature.  Mild surrounding inflammatory stranding that extends to the pancreatic tail.  No adjacent free intraperitoneal air or well-defined/drainable intra-abdominal collection to suggest perforation or abscess.  Gastroenterology evaluation is suggested with strong consideration of EGD correlation.  Less likely that findings relate to pancreatitis at the pancreatic tail, but request correlation with serum lipase levels. 2. Stable postoperative changes at the distal stomach. 3. Stable mildly enlarged gastrohepatic ligament lymph nodes, which may be reactive. 4. Fatty liver. 5. Colonic diverticulosis.  No CT evidence of acute diverticulitis. 6. Small 1.7 cm simple-appearing left ovarian cyst is unchanged since comparison CT from December, 2022. 7. Circumferential urinary bladder wall thickening, which may relate to incomplete distention or cystitis.  If there are referable symptoms, urinalysis correlation is requested. 8. Bilateral hip arthroplasties are noted; resultant streak artifacts limit evaluation of the pelvic structures. 9. Stable chronic L1 and L2 vertebral body compression fractures. 10. Lesser incidental findings as above.   elm-remote  Dictated by (CST): Maverick Petersen MD on 1/09/2024 at 5:05 PM     Finalized by (CST): Maverick Petersen MD on 1/09/2024 at 5:14 PM          CT BRAIN OR HEAD (56106)    Result Date: 1/9/2024  CONCLUSION:  1. No acute intracranial hemorrhage or other acute intracranial  finding. 2. Small focus of soft tissue injury at the right lateral forehead.   elm-remote  Dictated by (CST): Maverick Petersen MD on 1/09/2024 at 5:00 PM     Finalized by (CST): Maverick Petersen MD on 1/09/2024 at 5:01 PM          MRI BRAIN WO ACUTE (3) SEQUENCE (CPT=70551)    Result Date: 1/9/2024  CONCLUSION:  1. Limited ER protocol 3 sequence imaging of the brain was performed.  No acute intracranial abnormality. Specifically, no evidence of acute or early subacute infarction. 2. Mild nonspecific white matter changes throughout both cerebral hemispheres, which most likely relate to early sequelae of chronic microangiopathy. 3. Dependent left maxillary sinus retention cyst. 4. Lesser incidental findings as above.   elm-remote  Dictated by (CST): Maverick Petersen MD on 1/09/2024 at 3:55 PM     Finalized by (CST): Maverick Petersen MD on 1/09/2024 at 3:57 PM           Assessment & Plan   Marissa Delaney is a a(n) 63 year old female w/ a history of chronic back pain on NSAIDs, hypertension, ETOH abuse, complex ulcer disease including history of duodenal and gastric ulcer bleeding, pyloric stenosis status post dilation, status post pyloroplasty, status post spontaneous perforation of gastric ulcer with surgical repair, who presents with epigastric pain.  Hemoglobin 8.7, no overt GI bleeding.  CT abdomen pelvis shows a large gastric ulcer along the greater curvature, no imaging signs of perforation or abscess.  Most likely NSAID induced gastropathy.  There is hepatic steatosis of the liver, likely ETOH induced.     Recommend:  - PPI 40mg IV BID  - EGD today  - avoid nsaids  - ETOH cessation discussed     Nasra Mary MD  [facility]enter[facility]  1/10/2024    This note was partially prepared using Dragon Medical voice recognition dictation software. As a result, errors may occur. When identified, these errors have been corrected. While every attempt is made to correct errors during dictation, discrepancies may still exist.

## 2024-01-10 NOTE — CONSULTS
[facility]    Report of Consultation    Marissa Delaney Patient Status:  Inpatient    1960 MRN [id number]   Location [facility] 5SW/SE Attending Kushal Clay MD   Hosp Day # 1 PCP Alyx Olivo MD     Date of Admission:  2024  Date of Consult:  01/10/24   Reason for Consultation:   Patient presented with abdominal pain, dizziness, and frequent falls. Kushal Hart MD requested psychiatric consult for evaluation and advice.    Consult Duration     The patient seen for initial psychiatric consult evaluation.   Record reviewed, communication with attending, communication with RN and patient seen face to face evaluation.    History of Present Illness:   Patient is a 63 year old ,  female with past medical history of hypertension, hyperlipidemia, hypothyroidism, and alcohol dependence who was admitted to the hospital for Dizziness and management of alcohol dependence: The patient has been demonstrating low energy, low mood, and worry without withdrawal symptoms. Patient indicated for psych consult for evaluation and advise.    Per chart review, the patient presented to the hospital with abdominal pain, dizziness, and frequent falls.     The patient received no psychotropic medications    Labs and imaging reviewed: AST 22, ALT 19, Mg 1.5      Most recent CIWA 0  Vital signs   BP: 124/71  HR:85    The patient is well known to the psychiatry team from multiple previous consults. The patient was last seen 2016 for depression and alcohol dependence.    The patient seen today laying in bed. The patient is calm, cooperative, and pleasant. Minimal tremors with restlessness.    The patient is alert and oriented to person, place, date and condition. The patient answers questions and engages in appropriate conversation with no impairment in cognition or distortion in thought process.     The patient demonstrates minimal tremors with no other withdrawal symptoms    The  patient demonstrates feelings of hopelessness, low mood, low energy, decreased motivation.    The patient reporting increased anxiety and worry.    The patient reports an ongoing alcohol dependence.   She states that she drinks one bottle of wine a night. She reports that it relaxes her. She is currently not interested in stopping drinking. She reports her last drink was Monday night and denies any current symptoms of withdraw. She admits that she usually will get shakes and have difficulty sleeping with the cessation of alcohol.     She lives at home with her . She states that her  quit drinking 30 years ago, but she is not interested in quitting. She reports that he can be short tempered and difficult to live with. She denies any physical abuse. She states that he does not do things with her while they are home. She states they do go out to dinner 2x per week and goes to the movies 3x a week. She reports that these outings are comforting and beneficial for their relationship.     She reports that in 2008 she had her license revoked. Since then she has been unable to go out with friends or do the things that used to bring her tomas. When asked about her future, she states that in one year she will either be dead or \"in the same house, doing nothing\".    The patient is not demonstrating any brittanie or psychosis  The patient denies auditory or visual hallucinations  The patient denies suicidal or homicidal ideation.    The patient has been demonstrating low energy, low mood, and worry without withdrawal symptoms. She denies suicidal ideations.  She agreed on taking Lexapro otherwise not sure if it is going to help and denying intention to quit drinking although she keeps coming with GI bleed.    Past Psychiatric/Medication History:  1. Prior diagnoses: Alcohol dependence 2016, depression 2016  2. Patient was seen by me in 2016.  4. Past suicide history: none reported  5. Medication history: none  reported    Social History:   Lives at home with her . No children. Not currently working.  Drinks one bottle of win nightly. Denies smoking or illicit drugs.     Family History:  Patient reports that her father passed away in his 70s due to a heart issue. She states he had an alcohol dependence.   Medical History:   Past Medical History  Past Medical History:   Diagnosis Date    Broken wrist 2012    per [employer]:  \"Management:  orif left wrist\"    Disorder of thyroid     Duodenal ulcer     GI bleed     High blood pressure     High cholesterol     Hypothyroidism     Osteoarthritis of left hip 2010    Renal insufficiency 1/9/2024    Unspecified essential hypertension     Visual impairment        Past Surgical History  Past Surgical History:   Procedure Laterality Date    COLON SURGERY  03/01/2018    ELECTROCARDIOGRAM, COMPLETE  05-    Scanned to Media Tab - Date of Service 05-    FRACTURE SURGERY      HIP REPLACEMENT SURGERY Left 2010    left hip replacement    HIP REPLACEMENT SURGERY Right 2013    right hip replacement    OTHER Right 09/28/15    ORIF patella    TOTAL HIP REPLACEMENT      WRIST FRACTURE SURGERY Left 2012       Family History  Family History   Problem Relation Age of Onset    Arthritis Mother     Heart Disease Father         Valve repair       Social History  Social History     Socioeconomic History    Marital status:    Tobacco Use    Smoking status: Never    Smokeless tobacco: Never   Vaping Use    Vaping Use: Never used   Substance and Sexual Activity    Alcohol use: Yes     Alcohol/week: 11.0 standard drinks of alcohol     Types: 11 Glasses of wine per week     Comment: few days a week    Drug use: No   Other Topics Concern    Caffeine Concern Yes     Comment: Coffee, occasionally     Social Determinants of Health     Food Insecurity: No Food Insecurity (1/9/2024)    Food Insecurity     Food Insecurity: Never true   Transportation Needs: No Transportation Needs  (1/9/2024)    Transportation Needs     Lack of Transportation: No   Housing Stability: Low Risk  (1/9/2024)    Housing Stability     Housing Instability: No           Current Medications:  Current Facility-Administered Medications   Medication Dose Route Frequency    magnesium oxide (Mag-Ox) tab 800 mg  800 mg Oral Once    pantoprazole (Protonix) 40 mg in sodium chloride 0.9% PF 10 mL IV push  40 mg Intravenous Q12H    escitalopram (Lexapro) tab 5 mg  5 mg Oral Nightly    QUEtiapine (SEROquel) tab 50 mg  50 mg Oral Nightly    diazepam (Valium) 5 mg/mL injection 2.5 mg  2.5 mg Intravenous Q6H PRN    sodium chloride 0.9% infusion   Intravenous Continuous    acetaminophen (Tylenol Extra Strength) tab 500 mg  500 mg Oral Q4H PRN    ondansetron (Zofran) 4 MG/2ML injection 4 mg  4 mg Intravenous Q6H PRN    prochlorperazine (Compazine) 10 MG/2ML injection 5 mg  5 mg Intravenous Q8H PRN    morphINE PF 2 MG/ML injection 1 mg  1 mg Intravenous Q2H PRN    Or    morphINE PF 2 MG/ML injection 2 mg  2 mg Intravenous Q2H PRN    Or    morphINE PF 4 MG/ML injection 4 mg  4 mg Intravenous Q2H PRN    LORazepam (Ativan) tab 1 mg  1 mg Oral Q1H PRN    Or    LORazepam (Ativan) tab 2 mg  2 mg Oral Q1H PRN    metoprolol tartrate (Lopressor) tab 25 mg  25 mg Oral BID PRN    thiamine (Vitamin B1) tab 100 mg  100 mg Oral Daily    multivitamin with minerals (Thera M Plus) tab 1 tablet  1 tablet Oral Daily    folic acid (Folvite) tab 1 mg  1 mg Oral Daily     Medications Prior to Admission   Medication Sig    rosuvastatin 5 MG Oral Tab Take 1 tablet (5 mg total) by mouth nightly.    amLODIPine 10 MG Oral Tab Take 1 tablet (10 mg total) by mouth daily.    Losartan Potassium-HCTZ 100-12.5 MG Oral Tab Take 1 tablet by mouth daily.    levothyroxine 112 MCG Oral Tab Take 1 tablet (112 mcg total) by mouth before breakfast. (Patient taking differently: Take 75 mcg by mouth before breakfast.)       Allergies  No Known Allergies    Review of Systems:    As by Admitting/Attending    Results:   Laboratory Data:  Lab Results   Component Value Date    WBC 8.3 01/10/2024    HGB 8.7 (L) 01/10/2024    HCT 26.8 (L) 01/10/2024    .0 01/10/2024    CREATSERUM 1.48 (H) 01/10/2024    BUN 25 (H) 01/10/2024     01/10/2024    K 3.2 (L) 01/10/2024     01/10/2024    CO2 27.0 01/10/2024    GLU 89 01/10/2024    CA 11.8 (H) 01/10/2024    ALB 4.1 01/09/2024    ALKPHO 71 01/09/2024    TP 7.2 01/09/2024    AST 22 01/09/2024    ALT 19 01/09/2024    PTT 24.5 09/19/2018    INR 1.0 09/19/2018    PTP 12.7 09/19/2018    T4F 1.1 10/02/2023    TSH 1.306 01/09/2024    LIP 32 01/09/2024    DDIMER 2.72 (H) 03/01/2018    MG 1.5 (L) 01/09/2024    PHOS 3.6 03/09/2018    TROP 0.04 (HH) 03/01/2018    B12 497 10/02/2023    ETOH <3 01/09/2024         Imaging:  CT ABDOMEN PELVIS IV CONTRAST, NO ORAL (ER)    Result Date: 1/9/2024  CONCLUSION:  1. There is a suspected large 2.7 cm gastric ulcer along the dependent gastric body/greater curvature.  Mild surrounding inflammatory stranding that extends to the pancreatic tail.  No adjacent free intraperitoneal air or well-defined/drainable intra-abdominal collection to suggest perforation or abscess.  Gastroenterology evaluation is suggested with strong consideration of EGD correlation.  Less likely that findings relate to pancreatitis at the pancreatic tail, but request correlation with serum lipase levels. 2. Stable postoperative changes at the distal stomach. 3. Stable mildly enlarged gastrohepatic ligament lymph nodes, which may be reactive. 4. Fatty liver. 5. Colonic diverticulosis.  No CT evidence of acute diverticulitis. 6. Small 1.7 cm simple-appearing left ovarian cyst is unchanged since comparison CT from December, 2022. 7. Circumferential urinary bladder wall thickening, which may relate to incomplete distention or cystitis.  If there are referable symptoms, urinalysis correlation is requested. 8. Bilateral hip arthroplasties are  noted; resultant streak artifacts limit evaluation of the pelvic structures. 9. Stable chronic L1 and L2 vertebral body compression fractures. 10. Lesser incidental findings as above.   elm-remote  Dictated by (CST): Maverick Petersen MD on 1/09/2024 at 5:05 PM     Finalized by (CST): Maverick Petersen MD on 1/09/2024 at 5:14 PM          CT BRAIN OR HEAD (45415)    Result Date: 1/9/2024  CONCLUSION:  1. No acute intracranial hemorrhage or other acute intracranial finding. 2. Small focus of soft tissue injury at the right lateral forehead.   elm-remote  Dictated by (CST): Maverick Petersen MD on 1/09/2024 at 5:00 PM     Finalized by (CST): Maverick Petersen MD on 1/09/2024 at 5:01 PM          MRI BRAIN WO ACUTE (3) SEQUENCE (CPT=70551)    Result Date: 1/9/2024  CONCLUSION:  1. Limited ER protocol 3 sequence imaging of the brain was performed.  No acute intracranial abnormality. Specifically, no evidence of acute or early subacute infarction. 2. Mild nonspecific white matter changes throughout both cerebral hemispheres, which most likely relate to early sequelae of chronic microangiopathy. 3. Dependent left maxillary sinus retention cyst. 4. Lesser incidental findings as above.   elm-remote  Dictated by (CST): Maverick Petersen MD on 1/09/2024 at 3:55 PM     Finalized by (CST): Maverick Petersen MD on 1/09/2024 at 3:57 PM           Vital Signs:   Blood pressure 124/71, pulse 85, temperature 98 °F (36.7 °C), temperature source Oral, resp. rate 17, height 71\", weight 80.7 kg (178 lb), SpO2 96%, not currently breastfeeding.    Mental Status Exam:   Appearance: Stated age female, in hospital gown, laying down in hospital bed.  Psychomotor: No psychomotor agitation, or retardation. Minimal tremors.  Orientation: Alert and oriented to person, place, time and condition.  Gait: Not evaluated.  Attitude/Coorperation: Cooperative and attentive.  Behavior: Appropriate.  Somewhat superficial and guarded.  Speech: Regular rate and rhythm  speech.  Mood: Patient reporting depressed mood.  Affect: Restricted affect otherwise congruent with the mood.  Thought process: Linear and appropriate.  Thought content: Patient denies any suicidal or homicidal ideation.  Perceptions: Patient denies any auditory or visual hallucinations.  Concentration: Grossly intact.  Memory: Grossly intact.  Intellect: Average.  Judgment and Insight: Questionable.     Impression:     Alcohol withdrawal syndrome uncomplicated.  Alcohol Dependence.  Episodic mood disorder      Dizziness    Anemia, unspecified type    Hypercalcemia    Renal insufficiency    Acute gastric ulcer, unspecified whether gastric ulcer hemorrhage or perforation present          Patient is a 63 year old ,  female with past medical history of hypertension, hyperlipidemia, hypothyroidism, and alcohol dependence who was admitted to the hospital for Dizziness and management of alcohol dependence: The patient has been demonstrating low energy, low mood, and worry without withdrawal symptoms.  The patient known to me from previous evaluation on 2016.  Patient that time used to drink 1 bottle of wine nightly with no interest in treatment as well at this time.  Patient admitted some mild withdrawal symptoms otherwise uncomplicated.  No history of seizure or DTs.  Otherwise patient reported that since 2008 when her last been provoked she has been demonstrating sense of depression and limited social activity and autonomy.  The patient has been demonstrating low energy, low mood, and worry without withdrawal symptoms. She denies suicidal ideations.     Discussed risk and benefit, acknowledging the current symptom and severity.  At this point, I would recommend the following approach:     Focus on safety  Focus on education and support.  Focus on insight orientation helping the patient understand diagnosis and treatment plan.  Start quetiapine 25mg nightly for sleep.  Start Lexapro 5mg for  anxiety.  Utilize Valium 2.5mg IV as needed for withdraw symptoms.  Continue CIWA protocol. At this time she is not interested in sobriety.   Processed with patient at length, the initiation of the above psychotropic medications I advised the patient of the risks, benefits, alternatives and potential side effects. The patient consents to administration of the medications and understands the right to refuse medications at any time. The patient verbalized understanding.   Coordinate plan with team    Orders This Visit:  Orders Placed This Encounter   Procedures    Basic Metabolic Panel (8)    Hepatic Function Panel (7)    CBC With Differential With Platelet    Urinalysis with Culture Reflex    Troponin I (High Sensitivity)    TSH W Reflex To Free T4    Magnesium    Ethyl Alcohol    PTH, Intact    Basic Metabolic Panel (8)    CBC With Differential With Platelet    Lipase    Magnesium    Potassium    Renal Function Panel    CBC With Differential With Platelet    Hemoglobin    Type and screen       Meds This Visit:  Requested Prescriptions      No prescriptions requested or ordered in this encounter       Fabio Hackett MD  1/10/2024    Note to Patient: The 21st Century Cures Act makes medical notes like these available to patients in the interest of transparency. However, be advised this is a medical document. It is intended as peer to peer communication. It is written in medical language and may contain abbreviations or verbiage that are unfamiliar. It may appear blunt or direct. Medical documents are intended to carry relevant information, facts as evident, and the clinical opinion of the practitioner. This note may have been transcribed using a voice dictation system. Voice recognition errors may occur. This should not be taken to alter the content or meaning of this note.

## 2024-01-11 ENCOUNTER — PATIENT OUTREACH (OUTPATIENT)
Dept: CASE MANAGEMENT | Age: 64
End: 2024-01-11

## 2024-01-11 NOTE — PROGRESS NOTES
LM for pt to call [facility] for TCM since discharge. [facility] phone number was provided for pt to call back.

## 2024-01-11 NOTE — PROGRESS NOTES
LM for pt to call [facility] for TCM since discharge. [facility] phone number was provided for pt to call back.

## 2024-01-11 NOTE — PAYOR COMM NOTE
--------------  ADMISSION REVIEW     Payor: [employer]  Subscriber #:  [id number]  Authorization Number: [id number]    Admit date: 1/9/24  Admit time: 11:02 PM       REVIEW DOCUMENTATION:    ED Provider Notes signed by Bandar Camp MD at 1/9/2024  5:37 PM        Patient Seen in: [facility] Emergency Department      History     Chief Complaint   Patient presents with    Dizziness     Stated Complaint: Dizziness, LLQ pain, L lower back. Nausea. Vomiting.    Subjective:   HPI    63-year-old female with listed history with a history of bleeding duodenal ulcer who drinks heavily it turns out per her  who has been falling recurrently and having dizzy spells over the last several weeks.  She has hit her head twice.  She is not on anticoagulants or antiplatelets.  She reports also some left lower quadrant pain.  No fever.    Objective:   Past Medical History:   Diagnosis Date    Broken wrist 2012    per [employer]:  \"Management:  orif left wrist\"    Disorder of thyroid     Duodenal ulcer     GI bleed     High blood pressure     High cholesterol     Hypothyroidism     Osteoarthritis of left hip 2010    Unspecified essential hypertension     Visual impairment      Review of Systems    Positive for stated complaint: Dizziness, LLQ pain, L lower back. Nausea. Vomiting.  Other systems are as noted in HPI.  Constitutional and vital signs reviewed.      All other systems reviewed and negative except as noted above.    Physical Exam     ED Triage Vitals [01/09/24 1229]   /71   Pulse 82   Resp 16   Temp 97 °F (36.1 °C)   Temp src Temporal   SpO2 95 %   O2 Device None (Room air)       Current:/74   Pulse 87   Temp 97 °F (36.1 °C) (Temporal)   Resp 16   Wt 82 kg   SpO2 97%   BMI 25.21 kg/m²       Physical Exam    Constitutional: Oriented to person, place, and time.  Appears well-developed. No distress.   Head: Normocephalic.  Small abrasion to the anterior mid forehead and small  healing bruise to the right anterior forehead  Eyes: Conjunctivae are normal. Pupils are equal, round, and reactive to light.   Neck: Normal range of motion. Neck supple.  No midline pain posteriorly  Cardiovascular: Normal rate, regular rhythm and intact and equal distal pulses.    Pulmonary/Chest: Effort normal. No respiratory distress.   Abdominal: Soft. There is no tenderness. There is no guarding.   Musculoskeletal: Normal range of motion. No edema or tenderness.   Neurological: Alert and oriented to person, place, and time.  No gross focal deficits.  I did ambulate the patient to the bathroom and her gait did seem unsteady.  She did have mild ataxia not preferential to 1 side or another.  Skin: Skin is warm and dry.   Psychiatric: Normal mood and affect.  Behavior is normal.   Nursing note and vitals reviewed.    Differential diagnosis includes recurrent falls, TIA, CVA, intracranial hemorrhage, intracranial mass, UTI, dehydration, anemia and bleeding ulcer, electrolyte abnormality.      ED Course     Labs Reviewed   BASIC METABOLIC PANEL (8) - Abnormal; Notable for the following components:       Result Value    Glucose 108 (*)     Sodium 133 (*)     BUN 34 (*)     Creatinine 1.77 (*)     Calcium, Total 13.2 (*)     eGFR-Cr 32 (*)     All other components within normal limits   URINALYSIS WITH CULTURE REFLEX - Abnormal; Notable for the following components:    Protein Urine Trace (*)     All other components within normal limits   MAGNESIUM - Abnormal; Notable for the following components:    Magnesium 1.5 (*)     All other components within normal limits   CBC W/ DIFFERENTIAL - Abnormal; Notable for the following components:    RBC 2.93 (*)     HGB 9.4 (*)     HCT 27.9 (*)     All other components within normal limits   HEPATIC FUNCTION PANEL (7) - Normal   TROPONIN I HIGH SENSITIVITY - Normal   TSH W REFLEX TO FREE T4 - Normal   ETHYL ALCOHOL - Normal   PTH, INTACT - Normal   CBC WITH DIFFERENTIAL WITH  PLATELET    Narrative:     The following orders were created for panel order CBC With Differential With Platelet.  Procedure                               Abnormality         Status                     ---------                               -----------         ------                     CBC W/ DIFFERENTIAL[[id number]]          Abnormal            Final result                 Please view results for these tests on the individual orders.   LIPASE     EKG    Rate, intervals and axes as noted on EKG Report.  Rate: 78  Rhythm: Sinus Rhythm  Reading: No gross acute ischemic changes         CT ABDOMEN PELVIS IV CONTRAST, NO ORAL (ER)    Result Date: 1/9/2024  PROCEDURE: CT ABDOMEN PELVIS IV CONTRAST NO ORAL   CONCLUSION:  1. There is a suspected large 2.7 cm gastric ulcer along the dependent gastric body/greater curvature.  Mild surrounding inflammatory stranding that extends to the pancreatic tail.  No adjacent free intraperitoneal air or well-defined/drainable intra-abdominal collection to suggest perforation or abscess.  Gastroenterology evaluation is suggested with strong consideration of EGD correlation.  Less likely that findings relate to pancreatitis at the pancreatic tail, but request correlation with serum lipase levels. 2. Stable postoperative changes at the distal stomach. 3. Stable mildly enlarged gastrohepatic ligament lymph nodes, which may be reactive. 4. Fatty liver. 5. Colonic diverticulosis.  No CT evidence of acute diverticulitis. 6. Small 1.7 cm simple-appearing left ovarian cyst is unchanged since comparison CT from December, 2022. 7. Circumferential urinary bladder wall thickening, which may relate to incomplete distention or cystitis.  If there are referable symptoms, urinalysis correlation is requested. 8. Bilateral hip arthroplasties are noted; resultant streak artifacts limit evaluation of the pelvic structures. 9. Stable chronic L1 and L2 vertebral body compression fractures. 10. Lesser  incidental findings as above.   elm-remote  Dictated by (CST): Maverick Petersen MD on 1/09/2024 at 5:05 PM     Finalized by (CST): Maverick Petersen MD on 1/09/2024 at 5:14 PM          CT BRAIN OR HEAD (03021)    Result Date: 1/9/2024  PROCEDURE: CT BRAIN OR HEAD (CPT=70450)    CONCLUSION:  1. No acute intracranial hemorrhage or other acute intracranial finding. 2. Small focus of soft tissue injury at the right lateral forehead.   elm-remote  Dictated by (CST): Maverick Petersen MD on 1/09/2024 at 5:00 PM     Finalized by (CST): Maverick Petersen MD on 1/09/2024 at 5:01 PM          MRI BRAIN WO ACUTE (3) SEQUENCE (CPT=70551)    Result Date: 1/9/2024  PROCEDURE: MRI BRAIN WO ACUTE (3) SEQUENCE(CPT=70551)      CONCLUSION:  1. Limited ER protocol 3 sequence imaging of the brain was performed.  No acute intracranial abnormality. Specifically, no evidence of acute or early subacute infarction. 2. Mild nonspecific white matter changes throughout both cerebral hemispheres, which most likely relate to early sequelae of chronic microangiopathy. 3. Dependent left maxillary sinus retention cyst. 4. Lesser incidental findings as above.   elm-remote  Dictated by (CST): Maverick Petersen MD on 1/09/2024 at 3:55 PM     Finalized by (CST): Maverick Petersen MD on 1/09/2024 at 3:57 PM           [facility]        Admission disposition: 1/9/2024  5:26 PM    Medical Decision Making  Patient has been stable here but does have significant findings on exam.  I think her hypercalcemia is likely related to her excessive Tums ingestion.   states she drinks almost a bottle of wine a day.  It appears she likely has a drop in her hemoglobin although her rectal exam is grossly negative for melena at this time.  Her CT does show evidence of a likely large gastric ulcer.  I did consult GI and endocrine on behalf of the hospitalist.  The hospitalist saw the patient with me.  She will be started on IV fluids.  Her calcium will need to be monitored carefully.   I did replace her magnesium as well.  No indication for blood transfusion.  I did give her IV Protonix.  She will be admitted to hospital for further management and care.    Problems Addressed:  Acute gastric ulcer, unspecified whether gastric ulcer hemorrhage or perforation present: acute illness or injury that poses a threat to life or bodily functions  Anemia, unspecified type: acute illness or injury  Dizziness: acute illness or injury  Hypercalcemia: acute illness or injury that poses a threat to life or bodily functions  Renal insufficiency: chronic illness or injury    Amount and/or Complexity of Data Reviewed  External Data Reviewed: ECG.     Details: September 2018 EKG reviewed, no appreciable morphologic change from today's EKG  Labs: ordered. Decision-making details documented in ED Course.  Radiology: ordered and independent interpretation performed. Decision-making details documented in ED Course.     Details: By my gross review of the plain brain MRI did not appreciate obvious evidence of mass or midline shift  ECG/medicine tests: ordered and independent interpretation performed. Decision-making details documented in ED Course.    Risk  OTC drugs.  Prescription drug management.  Decision regarding hospitalization.    Critical Care  Total time providing critical care: minutes (I spent a total of 35 minutes of critical care time in obtaining history, performing a physical exam, bedside monitoring of interventions, collecting and interpreting tests and discussion with consultants but not including time spent performing procedures.)    Disposition and Plan     Clinical Impression:  1. Dizziness    2. Anemia, unspecified type    3. Hypercalcemia    4. Renal insufficiency    5. Acute gastric ulcer, unspecified whether gastric ulcer hemorrhage or perforation present       Disposition:  Admit  1/9/2024  5:26 pm  Hospital Problems       Present on Admission  Date Reviewed: 1/9/2024            ICD-10-CM Noted  POA    * (Principal) Dizziness R42 1/9/2024 Unknown        Signed by Bandar Camp MD on 1/9/2024  5:37 PM           H&P signed by El Palm MD at 1/10/2024 10:08 AM       HISTORY AND PHYSICAL EXAMINATION    [facility]    HISTORY AND PHYSICAL EXAMINATION    CHIEF COMPLAINT:  Hypercalcemia, dehydration, and acute kidney injury.     HISTORY OF PRESENT ILLNESS:  The patient is a 63-year-old  female who came in today to the emergency department for evaluation of epigastric discomfort, gait imbalance, and multiple falls at home.  CBC showed hemoglobin of 9.4, which has dropped from baseline of 12.7 in October 2023.  Chemistry showed sodium 133, BUN and creatinine of 34 and 1.77, calcium 13.2, magnesium 1.5.  CT scan of the brain and MRI scan of the brain both showed no acute findings.  CT scan of the abdomen still pending.  PTH and alcohol level are still pending.  EKG today showed normal sinus rhythm.    PAST MEDICAL HISTORY:  Hypertension, hypothyroidism, generalized osteoarthritis, hyperlipidemia, chronic kidney disease stage 2 to 3, peptic ulcer disease secondary to NSAIDs and alcohol use.    REVIEW OF SYSTEMS:  Per the , the patient is a heavy alcohol user, has been drinking heavily and in the last 2 to 3 weeks she had gait imbalance and has fallen multiple times hitting her head on multiple objects.  Also, she has been having epigastric discomfort with difficulty with oral intake.  She continued to drink alcohol, but she cannot tell me how much and when was her last drink.  No vomiting.      PHYSICAL EXAMINATION:    GENERAL:  Alert and oriented to time, place, and person, mild distress.  VITAL SIGNS:  Temperature 97.0, pulse 81, respiratory rate 18, blood pressure 140/82, pulse ox 99% on room air.  HEENT:  Atraumatic.  Oropharynx clear.  Dry mucous membranes.  Normal hard and soft palate.  Eyes:  Anicteric sclerae.  Periorbital ecchymosis noted on both sides.  NECK:  Supple.  No  lymphadenopathy.  Trachea midline.    LUNGS:  Clear to auscultation bilaterally.  Normal respiratory effort.   HEART:  Regular rate and rhythm.  S1 and S2 auscultated.  No murmur.  ABDOMEN:  Soft, nondistended.  Discomfort to palpation left upper quadrant area.  No guarding or rebound tenderness.  Positive bowel sounds.  EXTREMITIES:  No peripheral edema, clubbing, or cyanosis.  NEUROLOGIC:  Decreased sensation to light touch in both feet and decrease proprioception in both feet.  Otherwise, no focal findings.  Gait imbalance noted upon examination.    ASSESSMENT AND PLAN:    1.   Ataxia.  Rule out cerebellar alcohol-induced ataxia.  Also, the patient has underlying severe neuropathy.  2.   Acute kidney injury, dehydration, and hypercalcemia most likely related to poor oral intake and large consumption of Tums.  3.   Epigastric pain with history of peptic ulcer disease.  Rectal exam done by the emergency room physician was negative for Hemoccult blood.  Rule out recurrent peptic ulcer disease.  4.   Alcohol abuse.  High risk for alcohol withdrawal.    The patient will be admitted to general medical floor.  IV fluids.  IV Protonix.  Obtain PTH and repeat calcium level in the morning.  Follow up on CT scan of the abdomen and pelvis.  Obtain Gastroenterology and Endocrinology consult.  Clear liquid diet.  Place her on alcohol detox protocol and monitor her clinical status.  Further recommendations to follow.    Dictated By El Palm MD  d: 01/09/2024 17:09:42  t: 01/09/2024 17:17:03  Job [id number]  FB/    Electronically signed by El Palm MD on 1/10/2024 10:08 AM         MEDICATIONS ADMINISTERED IN LAST 1 DAY:  folic acid (Folvite) tab 1 mg       Date Action Dose Route User    Discharged on 1/10/2024    1/10/2024 0844 Given 1 mg Oral Christiane Judd, MARIKA          lactated ringers infusion       Date Action Dose Route User    Discharged on 1/10/2024    1/10/2024 1447 New Bag (none) Intravenous  Carole Andrea CRNA          lidocaine PF (Xylocaine-MPF) 1% injection       Date Action Dose Route User    Discharged on 1/10/2024    1/10/2024 1449 Given 50 mg Intravenous Carole Andrea CRNA          pantoprazole (Protonix) 40 mg in sodium chloride 0.9% PF 10 mL IV push       Date Action Dose Route User    Discharged on 1/10/2024    1/10/2024 0843 Given 40 mg Intravenous Christiane Judd RN          potassium chloride (K-Dur) tab 40 mEq       Date Action Dose Route User    Discharged on 1/10/2024    1/10/2024 0844 Given 40 mEq Oral Christiane Judd RN          propofol (Diprivan) 10 MG/ML injection       Date Action Dose Route User    Discharged on 1/10/2024    1/10/2024 1449 Given 50 mg Intravenous Carole Andrea CRNA          propofol (Diprivan) 10 mg/mL infusion premix       Date Action Dose Route User    Discharged on 1/10/2024    1/10/2024 1454 Rate/Dose Change 150 mcg/kg/min × 80.7 kg Intravenous Carole Andrea CRNA    1/10/2024 1449 New Bag 200 mcg/kg/min × 80.7 kg Intravenous Carole Andrea CRNA          sodium chloride 0.9% infusion       Date Action Dose Route User    Discharged on 1/10/2024    1/10/2024 1010 New Bag (none) Intravenous Christiane Judd RN    1/10/2024 0946 Rate/Dose Change (none) Intravenous Christiane Judd RN          multivitamin with minerals (Thera M Plus) tab 1 tablet       Date Action Dose Route User    Discharged on 1/10/2024    1/10/2024 0844 Given 1 tablet Oral Christiane Judd RN          thiamine (Vitamin B1) tab 100 mg       Date Action Dose Route User    Discharged on 1/10/2024    1/10/2024 0844 Given 100 mg Oral Christiane Judd RN          magnesium sulfate in dextrose 5% 1 g/100mL infusion premix 1 g  Dose: 1 g  Freq: Once Route: IV  Last Dose: Stopped (01/09/24 1845)  Start: 01/09/24 1635 End: 01/09/24 1845   Order specific questions:        1706 CP-New Bag   1845 CP-Stopped                   pantoprazole (Protonix) 40 mg in sodium chloride 0.9% PF  10 mL IV push  Dose: 40 mg  Freq: Once Route: IV  Start: 01/09/24 1545 End: 01/09/24 1603   Admin Instructions:   Dilute with 10 ml NS; IV push over 2 minutes     1601 CP-Given           sodium chloride 0.9 % IV bolus 1,000 mL  Dose: 1,000 mL  Freq: Once Route: IV  Last Dose: Stopped (01/09/24 1845)  Start: 01/09/24 1531 End: 01/09/24 1845     1600 CP-New Bag   1845 CP-Stopped              Vitals (last day) before discharge       Date/Time Temp Pulse Resp BP SpO2 Weight O2 Device O2 Flow Rate (L/min) [facility]    01/10/24 1552 98 °F (36.7 °C) 88 19 131/72 98 % -- None (Room air) -- AB    01/10/24 1515 -- 84 23 127/66 96 % -- None (Room air) -- RA    01/10/24 1505 -- 79 23 116/59 96 % -- None (Room air) -- RA    01/10/24 1502 -- 81 21 116/63 95 % -- -- -- TZ    01/10/24 1350 -- -- -- 127/66 -- -- -- -- RL    01/10/24 1346 -- 83 21 -- 95 % 178 lb None (Room air) -- RL    01/10/24 1230 -- 89 -- 130/78 -- -- -- -- AB    01/10/24 1030 -- 87 -- 122/70 -- -- -- -- AB    01/10/24 0830 98 °F (36.7 °C) 85 17 124/71 96 % -- None (Room air) -- AB    01/10/24 0633 -- 97 18 121/76 98 % -- None (Room air) -- BW    01/10/24 0500 -- 83 -- -- -- -- -- -- AM    01/10/24 0438 -- 91 20 134/80 98 % -- None (Room air) -- BW    01/10/24 0300 -- 89 18 120/73 96 % -- None (Room air) -- BW    01/10/24 0130 -- 78 18 116/67 93 % -- None (Room air) -- BW    01/10/24 0049 -- 86 -- -- -- -- -- -- AM    01/09/24 2330 -- -- -- -- -- 178 lb -- -- BW    01/09/24 2327 98.2 °F (36.8 °C) 86 18 106/73 96 % -- None (Room air) -- BW    01/09/24 2130 -- 86 -- -- 92 % -- -- -- AC    01/09/24 2015 -- 91 -- 137/76 96 % -- None (Room air) -- CP    01/09/24 2000 -- 94 -- 130/73 95 % -- None (Room air) -- CP    01/09/24 1900 -- 117 -- 127/79 94 % -- None (Room air) -- KR    01/09/24 1845 -- 107 -- 148/70 93 % -- None (Room air) -- CP    01/09/24 1840 -- 97 16 134/70 95 % -- -- -- CP    01/09/24 1800 -- 86 -- -- 99 % -- -- -- CP    01/09/24 1520 -- 87 16 135/74 97 % --  -- -- CP    01/09/24 1500 -- 81 18 140/82 99 % -- -- -- CP    01/09/24 1255 -- -- -- -- -- 180 lb 12.4 oz -- -- MM    01/09/24 1229 97 °F (36.1 °C) 82 16 131/71 95 % -- None (Room air) -- AG          CIWA Scores (last 2 days) before discharge       Date/Time CIWA-Ar Total Who    01/10/24 1552 0 AB    01/10/24 1230 0 AB    01/10/24 1030 0 AB    01/10/24 0830 0 AB    01/10/24 0633 0 BW    01/10/24 0438 0 BW    01/10/24 0300 0 BW    01/10/24 0130 0 BW    01/09/24 2327 0 BW    01/09/24 1520 0 CP              --------------  DISCHARGE REVIEW    Payor: [employer]  Subscriber #:  [id number]  Authorization Number: [id number]    Admit date: 1/9/24  Admit time:  11:02 PM  Discharge Date: 1/10/2024  5:53 PM     Admitting Physician: El Palm MD  Attending Physician:  No att. providers found  Primary Care Physician: Alyx Olivo MD

## 2024-01-12 LAB — VIT D 1,25 DI-OH: 6.9 PG/ML

## 2024-01-12 NOTE — PAYOR COMM NOTE
--------------  DISCHARGE REVIEW    Payor: [employer]  Subscriber #:  [id number]  Authorization Number: [id number]    Admit date: 24  Admit time:  11:02 PM  Discharge Date: 1/10/2024  5:53 PM     Admitting Physician: [place] MD  Attending Physician:  No att. providers found  Primary Care Physician: Alyx Olivo MD          Discharge Summary Notes        Discharge Summary signed by Kushal Clay MD at 2024  5:00 PM       Author: Kushal Clay MD Specialty: HOSPITALIST Author Type: Physician    Filed: 2024  5:00 PM Date of Service: 1/10/2024  4:38 PM Status: Signed    : Kushal Clay MD (Physician)           [facility]     DISCHARGE SUMMARY     Marissa Delaney Patient Status:  Inpatient    1960 MRN [id number]   Location [facility] 5SW/SE Attending Kushal Clay MD   Hosp Day # 1 PCP Alyx Olivo MD     DATE OF ADMISSION: 2024  DATE OF DISCHARGE:  01/10/24  DISPOSITION: home  CONDITION ON DISCHARGE: good    DISCHARGE DIAGNOSES:  Peptic ulcer disease  Alcohol abuse  Acute blood loss anemia  Dehydration  Acute kidney injury  Hypercalcemia    HISTORY OF PRESENT ILLNESS (COPIED FROM ADMISSION H&P)  The patient is a 63-year-old  female who came in today to the emergency department for evaluation of epigastric discomfort, gait imbalance, and multiple falls at home.  CBC showed hemoglobin of 9.4, which has dropped from baseline of 12.7 in 2023.  Chemistry showed sodium 133, BUN and creatinine of 34 and 1.77, calcium 13.2, magnesium 1.5.  CT scan of the brain and MRI scan of the brain both showed no acute findings.  CT scan of the abdomen still pending.  PTH and alcohol level are still pending.  EKG today showed normal sinus rhythm.     HOSPITAL COURSE:  Patient was admitted, started on IV fluids.  Her hemoglobin dropped a little bit further but this was probably delusional.  Her hemoglobin was significantly below her  baseline.  There is high suspicion for GI losses despite negative fecal occult blood.  She has a history of peptic ulcer disease.  GI was consulted and she was taken for EGD which showed large gastric ulcer which was not actively bleeding.  Patient started on twice daily PPI.  She was hemodynamically stable and cleared by GI for discharge.  She was counseled extensively on the need for alcohol cessation.  She understands to follow-up with her primary care physician and gastroenterologist as an outpatient and have repeat hemoglobin checked in the next couple of weeks.    Patient did not exhibit any alcohol withdrawal symptoms during her admission.  She absolutely refused to stay in the hospital any longer and demanded discharge and 1/10/2024.  She was decisional.    Patient understands return to the emergency room for increased pain, fever, discharge, shortness of breath, chest pain, new neurologic symptoms, other concerning symptoms.    PHYSICAL EXAM:  Temp:  [98 °F (36.7 °C)-98.2 °F (36.8 °C)] 98 °F (36.7 °C)  Pulse:  [] 88  Resp:  [16-23] 19  BP: (106-148)/(59-80) 131/72  SpO2:  [92 %-99 %] 98 %  Gen: A+Ox3.  No distress.   HEENT: NCAT, neck supple, no carotid bruit.  CV: RRR, S1S2, and intact distal pulses. No gallop, rub, murmur.  Pulm: Effort and breath sounds normal. No distress, wheezes, rales, rhonchi.  Abd: Soft, NTND, BS normal, no mass, no HSM, no rebound/guarding.   Neuro: Normal reflexes, CN. Sensory/motor exams grossly normal deficit. Coordination  and gait normal.   MS: No joint effusions.  No peripheral edema.  Skin: Skin is warm and dry. No rashes, erythema, diaphoresis.   Psych: Normal mood and affect. Behavior and judgment normal.     DISCHARGE MEDICATIONS     Discharge Medications        START taking these medications        Instructions Prescription details   escitalopram 5 MG Tabs  Commonly known as: Lexapro      Take 1 tablet (5 mg total) by mouth nightly.   Quantity: 30 tablet  Refills:  0     pantoprazole 40 MG Tbec  Commonly known as: Protonix      Take 1 tablet (40 mg total) by mouth 2 (two) times daily before meals.   Stop taking on: February 9, 2024  Quantity: 60 tablet  Refills: 0            CONTINUE taking these medications        Instructions Prescription details   amLODIPine 10 MG Tabs  Commonly known as: Norvasc      Take 1 tablet (10 mg total) by mouth daily.   Quantity: 90 tablet  Refills: 3     levothyroxine 112 MCG Tabs  Commonly known as: Synthroid      Take 1 tablet (112 mcg total) by mouth before breakfast.   Quantity: 90 tablet  Refills: 3     Losartan Potassium-HCTZ 100-12.5 MG Tabs  Commonly known as: HYZAAR      Take 1 tablet by mouth daily.   Quantity: 90 tablet  Refills: 3     rosuvastatin 5 MG Tabs  Commonly known as: Crestor      Take 1 tablet (5 mg total) by mouth nightly.   Quantity: 90 tablet  Refills: 3               Where to Get Your Medications        These medications were sent to [id number] - [place], [place] - [address] AT [facility], [phone], [phone]  [address] [place] [address]      Phone: [phone]   escitalopram 5 MG Tabs  pantoprazole 40 MG Tbec         CONSULTANTS  Consultants         Provider   Role Specialty     Fabio Hackett MD  Consulting Physician Psychiatry     Ma, Nasra Cotto MD  Consulting Physician GASTROENTEROLOGY     Trina Patterson MD  Consulting Physician ENDOCRINOLOGY            FOLLOW UP:  Alyx Olivo MD  [address]  [address]  [facility] [address]  [phone]    Follow up in 1 week(s)  Post Discharge Followup    Tim Raymond MD  [address]  [facility] [address]  [phone]    Follow up in 2 week(s)  Post Discharge Followup    The above plan and follow-up instructions were reviewed with the patient and they verbalized understanding and agreement.  They understand to return to the emergency room for any concerning signs or symptoms.  Greater than 30 minutes spent on  discharge.  -----------------------    Hospital Discharge Diagnoses:  Peptic ulcer disease    Lace+ Score: 34  59-90 High Risk  29-58 Medium Risk  0-28   Low Risk.    TCM Follow-Up Recommendation:  LACE 29-58: Moderate Risk of readmission after discharge from the hospital.    Electronically signed by Kushal Clay MD on 1/11/2024  5:00 PM         REVIEWER COMMENTS

## 2024-01-15 ENCOUNTER — TELEPHONE (OUTPATIENT)
Facility: CLINIC | Age: 64
End: 2024-01-15

## 2024-01-15 NOTE — TELEPHONE ENCOUNTER
Dr. Raymond    Patient was advised to follow up with you after discharge on her paperwork.  Suri's first res24 isn't until mid February and There is no discharge clinic in 2 weeks on 2/2.  May I offer her one of your res24 on 1/31/24 or 2/7 to get her in?    Thank you

## 2024-01-15 NOTE — TELEPHONE ENCOUNTER
Pt states that she was told to follow up with Dr. Raymond 2 weeks after hospital stay.  No appts available with Dr. Raymond or Suri.  Next available is 3/8/24.  Please call.

## 2024-01-15 NOTE — TELEPHONE ENCOUNTER
Patient accepted below appointment and given detailed directions to [facility] office.    Your Appointments      Wednesday January 31, 2024 10:00 AM  Consult with Tim Raymond MD  [facility], [facility] [address] [facility] [address]  [place] [address]  [phone]                awaiting discharge, assessment change Awaiting transport to [facility] awaiting discharge, no change

## 2024-01-17 ENCOUNTER — OFFICE VISIT (OUTPATIENT)
Dept: FAMILY MEDICINE CLINIC | Facility: CLINIC | Age: 64
End: 2024-01-17
Payer: COMMERCIAL

## 2024-01-17 VITALS
BODY MASS INDEX: 24.59 KG/M2 | DIASTOLIC BLOOD PRESSURE: 76 MMHG | SYSTOLIC BLOOD PRESSURE: 137 MMHG | HEART RATE: 92 BPM | HEIGHT: 71 IN | WEIGHT: 175.63 LBS

## 2024-01-17 DIAGNOSIS — R10.13 EPIGASTRIC PAIN: ICD-10-CM

## 2024-01-17 DIAGNOSIS — F10.10 ALCOHOL ABUSE: ICD-10-CM

## 2024-01-17 DIAGNOSIS — K25.3 ACUTE GASTRIC ULCER WITHOUT HEMORRHAGE OR PERFORATION: Primary | ICD-10-CM

## 2024-01-17 DIAGNOSIS — Z12.31 SCREENING MAMMOGRAM FOR BREAST CANCER: ICD-10-CM

## 2024-01-17 PROCEDURE — 3075F SYST BP GE 130 - 139MM HG: CPT | Performed by: FAMILY MEDICINE

## 2024-01-17 PROCEDURE — 99214 OFFICE O/P EST MOD 30 MIN: CPT | Performed by: FAMILY MEDICINE

## 2024-01-17 PROCEDURE — 3078F DIAST BP <80 MM HG: CPT | Performed by: FAMILY MEDICINE

## 2024-01-17 PROCEDURE — 3008F BODY MASS INDEX DOCD: CPT | Performed by: FAMILY MEDICINE

## 2024-01-17 RX ORDER — ESCITALOPRAM OXALATE 10 MG/1
10 TABLET ORAL DAILY
Qty: 90 TABLET | Refills: 4 | Status: SHIPPED | OUTPATIENT
Start: 2024-01-17 | End: 2025-01-11

## 2024-01-17 NOTE — PROGRESS NOTES
Marissa Delaney is a 63 year old female.   Chief Complaint   Patient presents with    ER F/U     DATE OF ADMISSION: 1/9/2024  DATE OF DISCHARGE:  01/10/24  REASON: stomach pain     HPI:     Notes from admission   DATE OF ADMISSION: 1/9/2024  DATE OF DISCHARGE:  01/10/24  DISPOSITION: home  CONDITION ON DISCHARGE: good     DISCHARGE DIAGNOSES:  Peptic ulcer disease  Alcohol abuse  Acute blood loss anemia  Dehydration  Acute kidney injury  Hypercalcemia     HISTORY OF PRESENT ILLNESS (COPIED FROM ADMISSION H&P)  The patient is a 63-year-old  female who came in today to the emergency department for evaluation of epigastric discomfort, gait imbalance, and multiple falls at home.  CBC showed hemoglobin of 9.4, which has dropped from baseline of 12.7 in October 2023.  Chemistry showed sodium 133, BUN and creatinine of 34 and 1.77, calcium 13.2, magnesium 1.5.  CT scan of the brain and MRI scan of the brain both showed no acute findings.  CT scan of the abdomen still pending.  PTH and alcohol level are still pending.  EKG today showed normal sinus rhythm.      HOSPITAL COURSE:  Patient was admitted, started on IV fluids.  Her hemoglobin dropped a little bit further but this was probably delusional.  Her hemoglobin was significantly below her baseline.  There is high suspicion for GI losses despite negative fecal occult blood.  She has a history of peptic ulcer disease.  GI was consulted and she was taken for EGD which showed large gastric ulcer which was not actively bleeding.  Patient started on twice daily PPI.  She was hemodynamically stable and cleared by GI for discharge.  She was counseled extensively on the need for alcohol cessation.  She understands to follow-up with her primary care physician and gastroenterologist as an outpatient and have repeat hemoglobin checked in the next couple of weeks.     Patient did not exhibit any alcohol withdrawal symptoms during her admission.  She absolutely refused to stay in  the hospital any longer and demanded discharge and 1/10/2024.  She was decisional.     Patient understands return to the emergency room for increased pain, fever, discharge, shortness of breath, chest pain, new neurologic symptoms, other concerning symptoms.    Here for follow up   Doing ok on the lexapro. Reports no abdominal pain - no vomiting. Reports cut back on wine - having half bottle now  vs full.   Current Outpatient Medications on File Prior to Visit   Medication Sig Dispense Refill    escitalopram 5 MG Oral Tab Take 1 tablet (5 mg total) by mouth nightly. 30 tablet 0    pantoprazole 40 MG Oral Tab EC Take 1 tablet (40 mg total) by mouth 2 (two) times daily before meals. 60 tablet 0    rosuvastatin 5 MG Oral Tab Take 1 tablet (5 mg total) by mouth nightly. 90 tablet 3    amLODIPine 10 MG Oral Tab Take 1 tablet (10 mg total) by mouth daily. 90 tablet 3    Losartan Potassium-HCTZ 100-12.5 MG Oral Tab Take 1 tablet by mouth daily. 90 tablet 3    levothyroxine 112 MCG Oral Tab Take 1 tablet (112 mcg total) by mouth before breakfast. (Patient taking differently: Take 75 mcg by mouth before breakfast.) 90 tablet 3     Current Facility-Administered Medications on File Prior to Visit   Medication Dose Route Frequency Provider Last Rate Last Admin    cyanocobalamin (VITAMIN B12) 1000 MCG/ML injection 1,000 mcg  1,000 mcg Intramuscular Q30 Days Alyx Olivo MD          Past Medical History:   Diagnosis Date    Broken wrist 2012    per [employer]:  \"Management:  orif left wrist\"    Disorder of thyroid     Duodenal ulcer     GI bleed     High blood pressure     High cholesterol     Hypothyroidism     Osteoarthritis of left hip 2010    Renal insufficiency 01/09/2024    Unspecified essential hypertension     Visual impairment       Social History:  Social History     Socioeconomic History    Marital status:    Tobacco Use    Smoking status: Never    Smokeless tobacco: Never   Vaping Use    Vaping Use: Never  used   Substance and Sexual Activity    Alcohol use: Yes     Alcohol/week: 11.0 standard drinks of alcohol     Types: 11 Glasses of wine per week     Comment: few days a week    Drug use: No   Other Topics Concern    Caffeine Concern Yes     Comment: Coffee, occasionally     Social Determinants of Health     Food Insecurity: No Food Insecurity (1/9/2024)    Food Insecurity     Food Insecurity: Never true   Transportation Needs: No Transportation Needs (1/9/2024)    Transportation Needs     Lack of Transportation: No   Housing Stability: Low Risk  (1/9/2024)    Housing Stability     Housing Instability: No        REVIEW OF SYSTEMS:   GENERAL HEALTH: feels well otherwise  RESPIRATORY: denies shortness of breath, denies cough  CARDIOVASCULAR: denies chest pain  GI: denies abdominal pain and denies heartburn      EXAM:   /76   Pulse 92   Ht 5' 11\" (1.803 m)   Wt 175 lb 9.6 oz (79.7 kg)   BMI 24.49 kg/m²   GENERAL: well developed  Unsteady gait   LUNGS: clear to auscultation  CARDIO: RRR without murmur  GI: good BS's,no masses, HSM or tenderness  EXTREMITIES: no cyanosis, clubbing or edema    ASSESSMENT AND PLAN:   1. Acute gastric ulcer without hemorrhage or perforation  Improved symptoms. Discussed importance of alcohol cessation.   - Comp Metabolic Panel (14); Future  - CBC With Differential With Platelet; Future  - Iron And Tibc [E]; Future    2. Epigastric pain  Improved     3. Alcohol abuse  Slight decrease in alcohol intake. Doing ok on lexapro so will increase dose to 10 mg.         The patient indicates understanding of these issues and agrees to the plan.      Alyx Olivo MD  1/17/2024  5:04 PM

## 2024-01-18 ENCOUNTER — LAB ENCOUNTER (OUTPATIENT)
Dept: LAB | Age: 64
End: 2024-01-18
Attending: FAMILY MEDICINE
Payer: COMMERCIAL

## 2024-01-18 DIAGNOSIS — N18.30 STAGE 3 CHRONIC KIDNEY DISEASE, UNSPECIFIED WHETHER STAGE 3A OR 3B CKD (HCC): Primary | ICD-10-CM

## 2024-01-18 DIAGNOSIS — I10 ESSENTIAL HYPERTENSION: ICD-10-CM

## 2024-01-18 DIAGNOSIS — K25.3 ACUTE GASTRIC ULCER WITHOUT HEMORRHAGE OR PERFORATION: ICD-10-CM

## 2024-01-18 DIAGNOSIS — E03.9 HYPOTHYROIDISM, UNSPECIFIED TYPE: ICD-10-CM

## 2024-01-18 LAB
ALBUMIN SERPL-MCNC: 4.3 G/DL (ref 3.2–4.8)
ALBUMIN/GLOB SERPL: 1.4 {RATIO} (ref 1–2)
ALP LIVER SERPL-CCNC: 76 U/L
ALT SERPL-CCNC: 19 U/L
ANION GAP SERPL CALC-SCNC: 7 MMOL/L (ref 0–18)
AST SERPL-CCNC: 26 U/L (ref ?–34)
BASOPHILS # BLD AUTO: 0.05 X10(3) UL (ref 0–0.2)
BASOPHILS NFR BLD AUTO: 0.5 %
BILIRUB SERPL-MCNC: 0.4 MG/DL (ref 0.2–1.1)
BUN BLD-MCNC: 19 MG/DL (ref 9–23)
BUN/CREAT SERPL: 13 (ref 10–20)
CALCIUM BLD-MCNC: 9.2 MG/DL (ref 8.7–10.4)
CHLORIDE SERPL-SCNC: 112 MMOL/L (ref 98–112)
CO2 SERPL-SCNC: 21 MMOL/L (ref 21–32)
CREAT BLD-MCNC: 1.46 MG/DL
DEPRECATED RDW RBC AUTO: 51 FL (ref 35.1–46.3)
EGFRCR SERPLBLD CKD-EPI 2021: 40 ML/MIN/1.73M2 (ref 60–?)
EOSINOPHIL # BLD AUTO: 0.14 X10(3) UL (ref 0–0.7)
EOSINOPHIL NFR BLD AUTO: 1.4 %
ERYTHROCYTE [DISTWIDTH] IN BLOOD BY AUTOMATED COUNT: 14.3 % (ref 11–15)
FASTING STATUS PATIENT QL REPORTED: YES
GLOBULIN PLAS-MCNC: 3.1 G/DL (ref 2.8–4.4)
GLUCOSE BLD-MCNC: 103 MG/DL (ref 70–99)
HCT VFR BLD AUTO: 28.1 %
HGB BLD-MCNC: 8.8 G/DL
IMM GRANULOCYTES # BLD AUTO: 0.06 X10(3) UL (ref 0–1)
IMM GRANULOCYTES NFR BLD: 0.6 %
IRON SATN MFR SERPL: 22 %
IRON SERPL-MCNC: 74 UG/DL
LYMPHOCYTES # BLD AUTO: 0.94 X10(3) UL (ref 1–4)
LYMPHOCYTES NFR BLD AUTO: 9.1 %
MCH RBC QN AUTO: 30.4 PG (ref 26–34)
MCHC RBC AUTO-ENTMCNC: 31.3 G/DL (ref 31–37)
MCV RBC AUTO: 97.2 FL
MONOCYTES # BLD AUTO: 0.67 X10(3) UL (ref 0.1–1)
MONOCYTES NFR BLD AUTO: 6.5 %
NEUTROPHILS # BLD AUTO: 8.43 X10 (3) UL (ref 1.5–7.7)
NEUTROPHILS # BLD AUTO: 8.43 X10(3) UL (ref 1.5–7.7)
NEUTROPHILS NFR BLD AUTO: 81.9 %
OSMOLALITY SERPL CALC.SUM OF ELEC: 293 MOSM/KG (ref 275–295)
PLATELET # BLD AUTO: 565 10(3)UL (ref 150–450)
POTASSIUM SERPL-SCNC: 4.7 MMOL/L (ref 3.5–5.1)
PROT SERPL-MCNC: 7.4 G/DL (ref 5.7–8.2)
RBC # BLD AUTO: 2.89 X10(6)UL
SODIUM SERPL-SCNC: 140 MMOL/L (ref 136–145)
T4 FREE SERPL-MCNC: 1.5 NG/DL (ref 0.8–1.7)
TIBC SERPL-MCNC: 343 UG/DL (ref 250–425)
TRANSFERRIN SERPL-MCNC: 230 MG/DL (ref 250–380)
TSI SER-ACNC: 0.7 MIU/ML (ref 0.55–4.78)
WBC # BLD AUTO: 10.3 X10(3) UL (ref 4–11)

## 2024-01-18 PROCEDURE — 85025 COMPLETE CBC W/AUTO DIFF WBC: CPT

## 2024-01-18 PROCEDURE — 80053 COMPREHEN METABOLIC PANEL: CPT

## 2024-01-18 PROCEDURE — 83540 ASSAY OF IRON: CPT

## 2024-01-18 PROCEDURE — 84439 ASSAY OF FREE THYROXINE: CPT

## 2024-01-18 PROCEDURE — 84466 ASSAY OF TRANSFERRIN: CPT

## 2024-01-18 PROCEDURE — 36415 COLL VENOUS BLD VENIPUNCTURE: CPT

## 2024-01-18 PROCEDURE — 84443 ASSAY THYROID STIM HORMONE: CPT

## 2024-01-18 NOTE — PROGRESS NOTES
Anemia is stable - not worsening and iron stores are actually normal. This anemia may be from chronic disease. You kidney function has been decreased the last few blood draws - worsened during hospitalized but still not recovered. I would like you to see Dr. William Zurita - Nephrologist at the hospital. I have placed the referral. - Dr. Olivo

## 2024-01-29 ENCOUNTER — MED REC SCAN ONLY (OUTPATIENT)
Facility: CLINIC | Age: 64
End: 2024-01-29

## 2024-01-31 ENCOUNTER — OFFICE VISIT (OUTPATIENT)
Facility: CLINIC | Age: 64
End: 2024-01-31
Payer: COMMERCIAL

## 2024-01-31 ENCOUNTER — LAB ENCOUNTER (OUTPATIENT)
Dept: LAB | Facility: HOSPITAL | Age: 64
End: 2024-01-31
Attending: INTERNAL MEDICINE
Payer: COMMERCIAL

## 2024-01-31 ENCOUNTER — TELEPHONE (OUTPATIENT)
Facility: CLINIC | Age: 64
End: 2024-01-31

## 2024-01-31 VITALS
BODY MASS INDEX: 24.22 KG/M2 | DIASTOLIC BLOOD PRESSURE: 74 MMHG | HEIGHT: 71 IN | HEART RATE: 97 BPM | WEIGHT: 173 LBS | SYSTOLIC BLOOD PRESSURE: 120 MMHG

## 2024-01-31 DIAGNOSIS — Z87.19 HISTORY OF GI BLEED: ICD-10-CM

## 2024-01-31 DIAGNOSIS — Z87.11 HISTORY OF STOMACH ULCERS: ICD-10-CM

## 2024-01-31 DIAGNOSIS — D64.9 ANEMIA, UNSPECIFIED TYPE: Primary | ICD-10-CM

## 2024-01-31 DIAGNOSIS — D64.9 ANEMIA, UNSPECIFIED TYPE: ICD-10-CM

## 2024-01-31 DIAGNOSIS — K27.9 PUD (PEPTIC ULCER DISEASE): ICD-10-CM

## 2024-01-31 DIAGNOSIS — Z12.11 SCREENING FOR COLON CANCER: Primary | ICD-10-CM

## 2024-01-31 DIAGNOSIS — Z12.11 COLON CANCER SCREENING: ICD-10-CM

## 2024-01-31 LAB
BASOPHILS # BLD AUTO: 0.03 X10(3) UL (ref 0–0.2)
BASOPHILS NFR BLD AUTO: 0.5 %
DEPRECATED HBV CORE AB SER IA-ACNC: 36.4 NG/ML
DEPRECATED RDW RBC AUTO: 46.9 FL (ref 35.1–46.3)
EOSINOPHIL # BLD AUTO: 0.08 X10(3) UL (ref 0–0.7)
EOSINOPHIL NFR BLD AUTO: 1.2 %
ERYTHROCYTE [DISTWIDTH] IN BLOOD BY AUTOMATED COUNT: 13.4 % (ref 11–15)
HCT VFR BLD AUTO: 31.8 %
HGB BLD-MCNC: 10.4 G/DL
IMM GRANULOCYTES # BLD AUTO: 0.02 X10(3) UL (ref 0–1)
IMM GRANULOCYTES NFR BLD: 0.3 %
LYMPHOCYTES # BLD AUTO: 1.01 X10(3) UL (ref 1–4)
LYMPHOCYTES NFR BLD AUTO: 15.7 %
MCH RBC QN AUTO: 31 PG (ref 26–34)
MCHC RBC AUTO-ENTMCNC: 32.7 G/DL (ref 31–37)
MCV RBC AUTO: 94.9 FL
MONOCYTES # BLD AUTO: 0.5 X10(3) UL (ref 0.1–1)
MONOCYTES NFR BLD AUTO: 7.8 %
NEUTROPHILS # BLD AUTO: 4.79 X10 (3) UL (ref 1.5–7.7)
NEUTROPHILS # BLD AUTO: 4.79 X10(3) UL (ref 1.5–7.7)
NEUTROPHILS NFR BLD AUTO: 74.5 %
PLATELET # BLD AUTO: 399 10(3)UL (ref 150–450)
RBC # BLD AUTO: 3.35 X10(6)UL
WBC # BLD AUTO: 6.4 X10(3) UL (ref 4–11)

## 2024-01-31 PROCEDURE — 85025 COMPLETE CBC W/AUTO DIFF WBC: CPT

## 2024-01-31 PROCEDURE — 36415 COLL VENOUS BLD VENIPUNCTURE: CPT

## 2024-01-31 PROCEDURE — 82728 ASSAY OF FERRITIN: CPT

## 2024-01-31 PROCEDURE — 99214 OFFICE O/P EST MOD 30 MIN: CPT | Performed by: INTERNAL MEDICINE

## 2024-01-31 NOTE — TELEPHONE ENCOUNTER
Scheduled for:  Colonoscopy 55525, EGD 63925  Provider Name:    Date:  6/7/2024  Location:  [facility]  Sedation:  MAC  Time:  9:30 am, (pt is aware to arrive at 8:30 am)  Prep:  Golytely  Meds/Allergies Reconciled?: Physician reviewed   Diagnosis with codes:  Screening for Colon Cancer Z12.11, History of Stomach Ulcers Z87.11  Was patient informed to call insurance with codes (Y/N): Yes    Referral sent?:  Referral was sent at the time of electronic surgical scheduling.  [facility] or [facility] notified?:   I sent an electronic request to Endo Scheduling and received a confirmation today.  Medication Orders:  Pt is aware to NOT take iron pills, herbal meds and diet supplements for 7 days before exam. Also to NOT take any form of alcohol, recreational drugs and any forms of ED meds 24 hours before exam.   Misc Orders:  N/A     Further instructions given by staff: I discussed the prep instructions with the patient which she verbally understood. Provided patient with prep instructions and cancellation policy at the time of office visit.

## 2024-01-31 NOTE — PATIENT INSTRUCTIONS
Stomach ulcer   - repeat EGD in 3 months ( with colonoscopy for colon cancer screening)  - continue on pantoprazole twice a day for 1 more week then go down to daily x 3 months  - avoid nsaids and aspirin  - cut out alcohol     Colon cancer screening  - colonoscopy with Golytely and MAC with the EGD above

## 2024-02-01 ENCOUNTER — TELEPHONE (OUTPATIENT)
Facility: CLINIC | Age: 64
End: 2024-02-01

## 2024-02-02 NOTE — TELEPHONE ENCOUNTER
Requested Prescriptions     Pending Prescriptions Disp Refills    polyethylene glycol, PEG 3350-KCl-NaBcb-NaCl-NaSulf, 236 g Oral Recon Soln 4000 mL 0     Sig: Take 4,000 mL by mouth once for 1 dose. As directed by GI clinic instructions.         polyethylene glycol, PEG 3350-KCl-NaBcb-NaCl-NaSulf, 236 g Oral Recon Soln () 4000 mL 0 2024         DUPLICATE

## 2024-02-02 NOTE — TELEPHONE ENCOUNTER
Patient's colonoscopy and EGD with Dr. Raymond on 6/7/2024 at [facility] is now canceled in [employer] and [employer]. Please refer to T.E dated 1/31/2024 for scheduling orders.    Left patient a voicemail to call office back if she needs to reschedule.    T.E closed.

## 2024-02-02 NOTE — TELEPHONE ENCOUNTER
Patient called to cancel colonoscopy and EGD with Dr. Raymond - patient does not want to reschedule at this time.    Please refer to T.HUMBERTO dated 2/1/2024.    Canceled in [employer] and [employer].    CANCELED:  Colonoscopy 56447, EGD 95667  Provider Name:    Date:  6/7/2024  Location:  [facility]  Sedation:  MAC  Time:  9:30 am, (pt is aware to arrive at 8:30 am)  Prep:  Golytely  Meds/Allergies Reconciled?: Physician reviewed   Diagnosis with codes:  Screening for Colon Cancer Z12.11, History of Stomach Ulcers Z87.11

## 2024-02-05 NOTE — PROGRESS NOTES
Marissa Delaney is a 63 year old female.    HPI:     Chief Complaint   Patient presents with    Hospital F/U     The patient is a 63-year-old female with history of thyroid disorder, peptic ulcer disease, high blood pressure, high cholesterol, osteoarthritis, visual impairment who presents today for follow-up.  She was hospitalized recently with anemia and found to have a large ulcer.  She denies any abdominal pain, she denies any melena or black stools.  She has been anemic and has not been taking iron.    She denies any lower GI symptoms, no bright red blood per rectum or other issues.  She has not had colon cancer screening.       HISTORY:  Past Medical History:   Diagnosis Date    Broken wrist 2012    per [employer]:  \"Management:  orif left wrist\"    Disorder of thyroid     Duodenal ulcer     GI bleed     High blood pressure     High cholesterol     Hypothyroidism     Osteoarthritis of left hip 2010    Renal insufficiency 01/09/2024    Unspecified essential hypertension     Visual impairment       Past Surgical History:   Procedure Laterality Date    COLON SURGERY  03/01/2018    ELECTROCARDIOGRAM, COMPLETE  05-    Scanned to Media Tab - Date of Service 05-    FRACTURE SURGERY      HIP REPLACEMENT SURGERY Left 2010    left hip replacement    HIP REPLACEMENT SURGERY Right 2013    right hip replacement    OTHER Right 09/28/15    ORIF patella    TOTAL HIP REPLACEMENT      WRIST FRACTURE SURGERY Left 2012      Family History   Problem Relation Age of Onset    Arthritis Mother     Heart Disease Father         Valve repair      Social History:   Social History     Socioeconomic History    Marital status:    Tobacco Use    Smoking status: Never    Smokeless tobacco: Never   Vaping Use    Vaping Use: Never used   Substance and Sexual Activity    Alcohol use: Yes     Alcohol/week: 7.0 standard drinks of alcohol     Types: 7 Glasses of wine per week     Comment: few days a week    Drug use: Never   Other  Topics Concern    Caffeine Concern Yes     Comment: Coffee, occasionally     Social Determinants of Health     Food Insecurity: No Food Insecurity (1/9/2024)    Food Insecurity     Food Insecurity: Never true   Transportation Needs: No Transportation Needs (1/9/2024)    Transportation Needs     Lack of Transportation: No   Housing Stability: Low Risk  (1/9/2024)    Housing Stability     Housing Instability: No        Medications (Active prior to today's visit):  Current Outpatient Medications   Medication Sig Dispense Refill    escitalopram (LEXAPRO) 10 MG Oral Tab Take 1 tablet (10 mg total) by mouth daily. 90 tablet 4    pantoprazole 40 MG Oral Tab EC Take 1 tablet (40 mg total) by mouth 2 (two) times daily before meals. 60 tablet 0    rosuvastatin 5 MG Oral Tab Take 1 tablet (5 mg total) by mouth nightly. 90 tablet 3    amLODIPine 10 MG Oral Tab Take 1 tablet (10 mg total) by mouth daily. 90 tablet 3    Losartan Potassium-HCTZ 100-12.5 MG Oral Tab Take 1 tablet by mouth daily. 90 tablet 3    levothyroxine 112 MCG Oral Tab Take 1 tablet (112 mcg total) by mouth before breakfast. (Patient taking differently: Take 75 mcg by mouth before breakfast.) 90 tablet 3       Allergies:  No Known Allergies      ROS:   The patient denies any chest pain or shortness of breath,  No neurologic or dermatologic symptoms.     PHYSICAL EXAM:   Blood pressure 120/74, pulse 97, height 5' 11\" (1.803 m), weight 173 lb (78.5 kg), not currently breastfeeding.    The patient appears their stated age and is in no acute distress  HEENT- anicteric sclera, neck no lymphadnopathy, OP- clear with no masses or lesions  Chest- Clear bilaterally, no wheezing,  Heart- regular rate, no murmur or gallop  Abdomen- Soft and nontender, nondistended  Ext- no clubbing or cyanosis  Skin- no rashes or lesions  Neuro- appropriate response, alert, no confusion  .  ASSESSMENT/PLAN:   Assessment   Encounter Diagnosis   Name Primary?    Anemia, unspecified type  Yes       Peptic ulcer disease  Anemia  Colon cancer screening    We discussed management of her ulcer disease, avoidance of all NSAIDs and continue on pantoprazole twice daily for 1 more week and then she will go down to daily x 3 months.  I have advised that she stop all alcohol consumption as well.  For the patient's anemia advised blood count today and if still anemic then started on iron tablets to help boost her Red cell mass.    Discussed colon cancer screening patient's agreeable to proceeding with colonoscopy.  Risks and benefits reviewed.  All questions were answered.    Plan  Stomach ulcer   - repeat EGD in 3 months ( with colonoscopy for colon cancer screening)  - continue on pantoprazole twice a day for 1 more week then go down to daily x 3 months  - avoid nsaids and aspirin  - cut out alcohol     Colon cancer screening  - colonoscopy with Golytely and MAC with the EGD above    EGD/Colonoscopy consent: I have discussed the risks, benefits, and alternatives to EGD/colonoscopy with the patient [who demonstrated understanding], including but not limited to the risks of bleeding, infection, pain, death, as well as the risks of anesthesia and perforation all leading to prolonged hospitalization, surgical intervention, or even death. I also specifically mentioned the miss rate of colonoscopy of 5-10% in the best of all circumstances. All questions were answered to the patient's satisfaction. The patient signed informed consent and elected to proceed with colonoscopy with intervention [i.e. polypectomy, stent placement, etc.] as indicated.           Orders This Visit:  Orders Placed This Encounter   Procedures    CBC W Differential W Platelet    Ferritin       Meds This Visit:  Requested Prescriptions     Signed Prescriptions Disp Refills    polyethylene glycol, PEG 3350-KCl-NaBcb-NaCl-NaSulf, 236 g Oral Recon Soln 4000 mL 0     Sig: Take 4,000 mL by mouth once for 1 dose. As directed by GI clinic  instructions.       Imaging & Referrals:  None       Tim Raymond MD  [facility]

## 2024-02-07 DIAGNOSIS — I10 ESSENTIAL HYPERTENSION: ICD-10-CM

## 2024-02-07 RX ORDER — PANTOPRAZOLE SODIUM 40 MG/1
40 TABLET, DELAYED RELEASE ORAL
Qty: 60 TABLET | Refills: 0 | OUTPATIENT
Start: 2024-02-07 | End: 2024-03-08

## 2024-02-07 RX ORDER — ESCITALOPRAM OXALATE 10 MG/1
10 TABLET ORAL DAILY
Qty: 90 TABLET | Refills: 4 | Status: CANCELLED | OUTPATIENT
Start: 2024-02-07 | End: 2025-02-01

## 2024-02-09 RX ORDER — ROSUVASTATIN CALCIUM 5 MG/1
5 TABLET, COATED ORAL NIGHTLY
Qty: 90 TABLET | Refills: 3 | Status: SHIPPED | OUTPATIENT
Start: 2024-02-09

## 2024-02-09 NOTE — TELEPHONE ENCOUNTER
Clarification needed from patient regarding levothyroxine dose. Awaiting patient response.    Please review; protocol failed.  Requested Prescriptions   Pending Prescriptions Disp Refills    amLODIPine 10 MG Oral Tab 90 tablet 3     Sig: Take 1 tablet (10 mg total) by mouth daily.       Hypertension Medications Protocol Failed - 2/7/2024  2:20 PM        Failed - EGFRCR or GFRNAA > 50     GFR Evaluation  EGFRCR: 40 , resulted on 1/18/2024          Passed - CMP or BMP in past 12 months        Passed - Last BP reading less than 140/90     BP Readings from Last 1 Encounters:   01/31/24 120/74               Passed - In person appointment or virtual visit in the past 12 mos or appointment in next 3 mos     Recent Outpatient Visits              1 week ago Anemia, unspecified type    [facility], Tim Escoto MD    Office Visit    3 weeks ago Acute gastric ulcer without hemorrhage or perforation    [facility]    [facility]    4 months ago [facility] abuse    [facility]    [facility]    1 year ago Essential hypertension    [facility]    [facility]    2 years ago Hyperlipidemia, unspecified hyperlipidemia type    [facility]                    Please review; protocol failed.      Losartan Potassium-HCTZ 100-12.5 MG Oral Tab 90 tablet 3     Sig: Take 1 tablet by mouth daily.       Hypertension Medications Protocol Failed - 2/7/2024  2:20 PM        Failed - EGFRCR or GFRNAA > 50     GFR Evaluation  EGFRCR: 40 , resulted on 1/18/2024          Passed - CMP or BMP in past 12 months        Passed - Last BP reading less than 140/90     BP Readings from Last 1 Encounters:   01/31/24 120/74               Passed - In person  appointment or virtual visit in the past 12 mos or appointment in next 3 mos     Recent Outpatient Visits              1 week ago [facility], unspecified type    [facility]    [facility]    3 weeks ago [facility] ulcer without hemorrhage or perforation    [facility]    4 months [facility]    1 year ago Essential hypertension    [facility]    2 years ago Hyperlipidemia, unspecified hyperlipidemia type    [facility]                    Refill passed per [facility] protocol.      levothyroxine 112 MCG Oral Tab 90 tablet 3     Sig: Take 1 tablet (112 mcg total) by mouth before breakfast.       Thyroid Medication Protocol Passed - 2/7/2024  2:20 PM        Passed - TSH in past 12 months        Passed - Last TSH value is normal     Lab Results   Component Value Date    TSH 0.700 01/18/2024    THYROIDFUNC 6.00 (H) 08/25/2015                 Passed - In person appointment or virtual visit in the past 12 mos or appointment in next 3 mos     Recent Outpatient Visits              1 week ago Anemia, unspecified type    [facility]    [facility]    3 weeks ago Acute gastric ulcer without hemorrhage or perforation    [facility]    4 months ago [facility] abuse    [facility]    [facility]    1 year ago Essential hypertension    [facility]    [facility]    2 years ago  Hyperlipidemia, unspecified hyperlipidemia type    [facility]                    Refill passed per [facility] protocol.      rosuvastatin 5 MG Oral Tab 90 tablet 3     Sig: Take 1 tablet (5 mg total) by mouth nightly.       Cholesterol Medication Protocol Passed - 2/7/2024  2:20 PM        Passed - ALT < 80     Lab Results   Component Value Date    ALT 19 01/18/2024             Passed - ALT resulted within past year        Passed - Lipid panel within past 12 months     Lab Results   Component Value Date    CHOLEST 202 (H) 10/02/2023    TRIG 74 10/02/2023     (H) 10/02/2023    LDL 58 10/02/2023    VLDL 11 10/02/2023    NONHDLC 71 10/02/2023             Passed - In person appointment or virtual visit in the past 12 mos or appointment in next 3 mos     Recent Outpatient Visits              1 week ago Anemia, unspecified type    [facility] Tim Raymond MD    Office Visit    3 weeks ago Acute gastric ulcer without hemorrhage or perforation    [facility]    4 months ago Alcohol abuse    [facility]    [facility]    1 year ago Essential hypertension    [facility]    [facility]    2 years ago Hyperlipidemia, unspecified hyperlipidemia type    [facility]                    Refill passed per [facility] protocol.      escitalopram (LEXAPRO) 10 MG Oral Tab 90 tablet 4     Sig: Take 1 tablet (10 mg total) by mouth daily.       Psychiatric Non-Scheduled (Anti-Anxiety) Passed - 2/7/2024  2:20 PM        Passed - In person appointment or virtual visit in the past 6 mos or appointment in next 3 mos     Recent  Outpatient Visits              1 week ago Anemia, unspecified type    [facility], Tim Escoto MD    Office Visit    3 weeks ago Acute gastric ulcer without hemorrhage or perforation    [facility] [place]    Office Visit    4 months ago Alcohol abuse    [facility] [place]    Office Visit    1 year ago Essential hypertension    [facility]    [facility] Visit    2 years ago Hyperlipidemia, unspecified hyperlipidemia type    [facility] [place]    Office Visit                      Passed - Depression Screening completed within the past 12 months

## 2024-02-10 RX ORDER — LEVOTHYROXINE SODIUM 112 UG/1
112 TABLET ORAL
Qty: 90 TABLET | Refills: 3 | Status: SHIPPED | OUTPATIENT
Start: 2024-02-10

## 2024-02-11 RX ORDER — AMLODIPINE BESYLATE 10 MG/1
10 TABLET ORAL DAILY
Qty: 90 TABLET | Refills: 3 | Status: SHIPPED | OUTPATIENT
Start: 2024-02-11

## 2024-02-11 RX ORDER — LOSARTAN POTASSIUM AND HYDROCHLOROTHIAZIDE 12.5; 1 MG/1; MG/1
1 TABLET ORAL DAILY
Qty: 90 TABLET | Refills: 3 | Status: SHIPPED | OUTPATIENT
Start: 2024-02-11

## 2024-03-07 DIAGNOSIS — I10 ESSENTIAL HYPERTENSION: ICD-10-CM

## 2024-03-08 RX ORDER — LOSARTAN POTASSIUM AND HYDROCHLOROTHIAZIDE 12.5; 1 MG/1; MG/1
1 TABLET ORAL DAILY
Qty: 90 TABLET | Refills: 3 | OUTPATIENT
Start: 2024-03-08

## 2024-03-08 RX ORDER — ROSUVASTATIN CALCIUM 5 MG/1
5 TABLET, COATED ORAL NIGHTLY
Qty: 90 TABLET | Refills: 3 | OUTPATIENT
Start: 2024-03-08

## 2024-03-08 RX ORDER — AMLODIPINE BESYLATE 10 MG/1
10 TABLET ORAL DAILY
Qty: 90 TABLET | Refills: 3 | OUTPATIENT
Start: 2024-03-08

## 2024-03-08 RX ORDER — ESCITALOPRAM OXALATE 10 MG/1
10 TABLET ORAL DAILY
Qty: 90 TABLET | Refills: 4 | OUTPATIENT
Start: 2024-03-08 | End: 2025-03-03

## 2024-03-08 RX ORDER — LEVOTHYROXINE SODIUM 112 UG/1
112 TABLET ORAL
Qty: 90 TABLET | Refills: 3 | OUTPATIENT
Start: 2024-03-08

## 2024-03-08 NOTE — TELEPHONE ENCOUNTER
Refill passed per [facility] protocol.  Requested Prescriptions   Pending Prescriptions Disp Refills    escitalopram (LEXAPRO) 10 MG Oral Tab 90 tablet 4     Sig: Take 1 tablet (10 mg total) by mouth daily.       Psychiatric Non-Scheduled (Anti-Anxiety) Passed - 3/7/2024  2:15 PM        Passed - In person appointment or virtual visit in the past 6 mos or appointment in next 3 mos     Recent Outpatient Visits              1 month ago Anemia, unspecified type    [facility]    1 month ago Acute gastric ulcer without hemorrhage or perforation    [facility]    5 months ago [facility] abuse    [facility]    [facility]    1 year ago Essential hypertension    [facility]    [facility] Visit    2 years ago Hyperlipidemia, unspecified hyperlipidemia type    [facility]    [facility]                      Passed - Depression Screening completed within the past 12 months          amLODIPine 10 MG Oral Tab 90 tablet 3     Sig: Take 1 tablet (10 mg total) by mouth daily.       Hypertension Medications Protocol Failed - 3/7/2024  2:15 PM        Failed - EGFRCR or GFRNAA > 50     GFR Evaluation  EGFRCR: 40 , resulted on 1/18/2024          Passed - CMP or BMP in past 12 months        Passed - Last BP reading less than 140/90     BP Readings from Last 1 Encounters:   01/31/24 120/74               Passed - In person appointment or virtual visit in the past 12 mos or appointment in next 3 mos     Recent Outpatient Visits              1 month ago Anemia, unspecified type    [facility]    [facility]    1 month ago Acute gastric ulcer  without hemorrhage or perforation    [facility]    [facility]    5 months ago [facility] abuse    [facility]    [facility]    1 year ago Essential hypertension    [facility]    [facility]    2 years ago Hyperlipidemia, unspecified hyperlipidemia type    [facility]                        Losartan Potassium-HCTZ 100-12.5 MG Oral Tab 90 tablet 3     Sig: Take 1 tablet by mouth daily.       Hypertension Medications Protocol Failed - 3/7/2024  2:15 PM        Failed - EGFRCR or GFRNAA > 50     GFR Evaluation  EGFRCR: 40 , resulted on 1/18/2024          Passed - CMP or BMP in past 12 months        Passed - Last BP reading less than 140/90     BP Readings from Last 1 Encounters:   01/31/24 120/74               Passed - In person appointment or virtual visit in the past 12 mos or appointment in next 3 mos     Recent Outpatient Visits              1 month ago Anemia, unspecified type    [facility], [facility]    Office Visit    1 month ago Acute gastric ulcer without hemorrhage or perforation    [facility]    [facility]    5 months ago Alcohol abuse    [facility]    [facility] Visit    1 year ago Essential hypertension    [facility]    [facility]    2 years ago Hyperlipidemia, unspecified hyperlipidemia type    [facility]    [facility]                        levothyroxine 112 MCG Oral Tab 90 tablet 3     Sig: Take 1 tablet (112 mcg total) by mouth before breakfast.        Thyroid Medication Protocol Passed - 3/7/2024  2:15 PM        Passed - TSH in past 12 months        Passed - Last TSH value is normal     Lab Results   Component Value Date    TSH 0.700 01/18/2024    THYROIDFUNC 6.00 (H) 08/25/2015                 Passed - In person appointment or virtual visit in the past 12 mos or appointment in next 3 mos     Recent Outpatient Visits              1 month ago [facility], unspecified type    [facility]    1 month ago Acute gastric ulcer without hemorrhage or perforation    [facility]    [facility]    5 months ago Alcohol abuse    [facility]    [facility] Visit    1 year ago Essential hypertension    [facility]    [facility]    2 years ago Hyperlipidemia, unspecified hyperlipidemia type    [facility]    [facility]                        rosuvastatin 5 MG Oral Tab 90 tablet 3     Sig: Take 1 tablet (5 mg total) by mouth nightly.       Cholesterol Medication Protocol Passed - 3/7/2024  2:15 PM        Passed - ALT < 80     Lab Results   Component Value Date    ALT 19 01/18/2024             Passed - ALT resulted within past year        Passed - Lipid panel within past 12 months     Lab Results   Component Value Date    CHOLEST 202 (H) 10/02/2023    TRIG 74 10/02/2023     (H) 10/02/2023    LDL 58 10/02/2023    VLDL 11 10/02/2023    NONHDLC 71 10/02/2023             Passed - In person appointment or virtual visit in the past 12 mos or appointment in next 3 mos     Recent Outpatient Visits              1 month ago Anemia, unspecified type    [facility]    [facility]    1 month ago Acute gastric  ulcer without hemorrhage or perforation    [facility]    Office Visit    5 months ago Alcohol abuse    [facility]    Office Visit    1 year ago Essential hypertension    [facility]    Office Visit    2 years ago Hyperlipidemia, unspecified hyperlipidemia type    [facility]                         Recent Outpatient Visits              1 month ago Anemia, unspecified type    [facility], Tim Escoto MD    Office Visit    1 month ago Acute gastric ulcer without hemorrhage or perforation    [facility]    [facility] Visit    5 months ago Alcohol abuse    [facility]    Office Visit    1 year ago Essential hypertension    [facility]    [facility] Visit    2 years ago Hyperlipidemia, unspecified hyperlipidemia type    [facility]    [facility]

## 2024-04-03 ENCOUNTER — TELEPHONE (OUTPATIENT)
Dept: FAMILY MEDICINE CLINIC | Facility: CLINIC | Age: 64
End: 2024-04-03

## 2024-04-09 DIAGNOSIS — I10 ESSENTIAL HYPERTENSION: ICD-10-CM

## 2024-04-10 RX ORDER — ESCITALOPRAM OXALATE 10 MG/1
10 TABLET ORAL DAILY
Qty: 90 TABLET | Refills: 3 | Status: SHIPPED | OUTPATIENT
Start: 2024-04-10 | End: 2025-04-05

## 2024-04-10 RX ORDER — ROSUVASTATIN CALCIUM 5 MG/1
5 TABLET, COATED ORAL NIGHTLY
Qty: 90 TABLET | Refills: 3 | Status: SHIPPED | OUTPATIENT
Start: 2024-04-10

## 2024-04-10 RX ORDER — LEVOTHYROXINE SODIUM 112 UG/1
112 TABLET ORAL
Qty: 90 TABLET | Refills: 3 | Status: SHIPPED | OUTPATIENT
Start: 2024-04-10

## 2024-04-10 NOTE — TELEPHONE ENCOUNTER
Per refill team, patient does not have an active account with [facility] and this is not the preferred pharmacy for her [employer] plan.  Left message to clarify with patient and mychart message sent. 1 year refill sent to [facility] mail order pharmacy in February. Please clarify pharmacy with patient.

## 2024-04-10 NOTE — TELEPHONE ENCOUNTER
Passed protocol medications refilled.  GFR out of range for antihypertensives.  Please advise.     Requested Prescriptions     Pending Prescriptions Disp Refills    escitalopram (LEXAPRO) 10 MG Oral Tab 90 tablet 4     Sig: Take 1 tablet (10 mg total) by mouth daily.    amLODIPine 10 MG Oral Tab 90 tablet 3     Sig: Take 1 tablet (10 mg total) by mouth daily.    Losartan Potassium-HCTZ 100-12.5 MG Oral Tab 90 tablet 3     Sig: Take 1 tablet by mouth daily.    levothyroxine 112 MCG Oral Tab 90 tablet 3     Sig: Take 1 tablet (112 mcg total) by mouth before breakfast.    rosuvastatin 5 MG Oral Tab 90 tablet 3     Sig: Take 1 tablet (5 mg total) by mouth nightly.      Recent Visits  Date Type Provider Dept   01/17/24 Office Visit Alyx Olivo MD [facility]   10/02/23 Office Visit Alyx Olivo MD [facility]   Showing recent visits within past 540 days with a meds authorizing provider and meeting all other requirements  Future Appointments  No visits were found meeting these conditions.  Showing future appointments within next 150 days with a meds authorizing provider and meeting all other requirements     Requested Prescriptions   Pending Prescriptions Disp Refills    escitalopram (LEXAPRO) 10 MG Oral Tab 90 tablet 4     Sig: Take 1 tablet (10 mg total) by mouth daily.       Psychiatric Non-Scheduled (Anti-Anxiety) Passed - 4/9/2024 11:29 AM        Passed - In person appointment or virtual visit in the past 6 mos or appointment in next 3 mos     Recent Outpatient Visits              2 months ago Anemia, unspecified type    [facility], Tim Esc[facility]    Office Visit    2 months ago Acute gastric ulcer without hemorrhage or perforation    [facility] [place] [facility]    Office Visit    6 months ago Alcohol abuse    [facility] [place] [facility]    [facility]    1  year ago Essential hypertension    [facility]    Office Visit    2 years ago Hyperlipidemia, unspecified hyperlipidemia type    [facility]    [facility]                      Passed - Depression Screening completed within the past 12 months          amLODIPine 10 MG Oral Tab 90 tablet 3     Sig: Take 1 tablet (10 mg total) by mouth daily.       Hypertension Medications Protocol Failed - 4/9/2024 11:29 AM        Failed - EGFRCR or GFRNAA > 50     GFR Evaluation  EGFRCR: 40 , resulted on 1/18/2024          Passed - CMP or BMP in past 12 months        Passed - Last BP reading less than 140/90     BP Readings from Last 1 Encounters:   01/31/24 120/74               Passed - In person appointment or virtual visit in the past 12 mos or appointment in next 3 mos     Recent Outpatient Visits              2 months ago Anemia, unspecified type    [facility], Tim Escoto MD    Office Visit    2 months ago Acute gastric ulcer without hemorrhage or perforation    [facility]    [facility] Visit    6 months ago Alcohol abuse    [facility]    Office Visit    1 year ago Essential hypertension    [facility]    [facility] Visit    2 years ago Hyperlipidemia, unspecified hyperlipidemia type    [facility]    [facility]                        Losartan Potassium-HCTZ 100-12.5 MG Oral Tab 90 tablet 3     Sig: Take 1 tablet by mouth daily.       Hypertension Medications Protocol Failed - 4/9/2024 11:29 AM        Failed - EGFRCR or GFRNAA > 50     GFR Evaluation  EGFRCR: 40 , resulted on 1/18/2024          Passed - CMP or BMP in past 12  months        Passed - Last BP reading less than 140/90     BP Readings from Last 1 Encounters:   01/31/24 120/74               Passed - In person appointment or virtual visit in the past 12 mos or appointment in next 3 mos     Recent Outpatient Visits              2 months ago Anemia, unspecified type    [facility]    [facility]    2 months ago Acute gastric ulcer without hemorrhage or perforation    [facility]    [facility]    6 months ago [facility] abuse    [facility]    Office Visit    1 year ago Essential hypertension    [facility]    [facility] Visit    2 years ago Hyperlipidemia, unspecified hyperlipidemia type    [facility]    Office Visit                        levothyroxine 112 MCG Oral Tab 90 tablet 3     Sig: Take 1 tablet (112 mcg total) by mouth before breakfast.       Thyroid Medication Protocol Passed - 4/9/2024 11:29 AM        Passed - TSH in past 12 months        Passed - Last TSH value is normal     Lab Results   Component Value Date    TSH 0.700 01/18/2024    THYROIDFUNC 6.00 (H) 08/25/2015                 Passed - In person appointment or virtual visit in the past 12 mos or appointment in next 3 mos     Recent Outpatient Visits              2 months ago Anemia, unspecified type    [facility]    [facility]    2 months ago Acute gastric ulcer without hemorrhage or perforation    [facility]    [facility] Visit    6 months ago Alcohol abuse    [facility]    [facility]    1 year ago Essential hypertension     [facility]    [facility]    2 years ago Hyperlipidemia, unspecified hyperlipidemia type    [facility]    [facility]                        rosuvastatin 5 MG Oral Tab 90 tablet 3     Sig: Take 1 tablet (5 mg total) by mouth nightly.       Cholesterol Medication Protocol Passed - 4/9/2024 11:29 AM        Passed - ALT < 80     Lab Results   Component Value Date    ALT 19 01/18/2024             Passed - ALT resulted within past year        Passed - Lipid panel within past 12 months     Lab Results   Component Value Date    CHOLEST 202 (H) 10/02/2023    TRIG 74 10/02/2023     (H) 10/02/2023    LDL 58 10/02/2023    VLDL 11 10/02/2023    NONHDLC 71 10/02/2023             Passed - In person appointment or virtual visit in the past 12 mos or appointment in next 3 mos     Recent Outpatient Visits              2 months ago Anemia, unspecified type    [facility]    [facility]    2 months ago Acute gastric ulcer without hemorrhage or perforation    [facility]    [facility]    6 months ago [facility] abuse    [facility]    [facility]    1 year ago Essential hypertension    [facility]    [facility]    2 years ago Hyperlipidemia, unspecified hyperlipidemia type    [facility]    [facility]                             Recent Outpatient Visits              2 months ago Anemia, unspecified type    [facility]    Office Visit    2 months ago Acute gastric ulcer without hemorrhage or perforation    [facility]    Office Visit    6 months ago Alcohol abuse    [facility]    Office Visit    1 year ago Essential hypertension    [facility]    Office Visit    2 years ago Hyperlipidemia, unspecified hyperlipidemia type    [facility]    Office Visit

## 2024-04-11 RX ORDER — AMLODIPINE BESYLATE 10 MG/1
10 TABLET ORAL DAILY
Qty: 90 TABLET | Refills: 3 | Status: SHIPPED | OUTPATIENT
Start: 2024-04-11

## 2024-04-11 RX ORDER — LOSARTAN POTASSIUM AND HYDROCHLOROTHIAZIDE 12.5; 1 MG/1; MG/1
1 TABLET ORAL DAILY
Qty: 90 TABLET | Refills: 3 | Status: SHIPPED | OUTPATIENT
Start: 2024-04-11

## 2024-04-29 DIAGNOSIS — I10 ESSENTIAL HYPERTENSION: ICD-10-CM

## 2024-04-30 RX ORDER — ESCITALOPRAM OXALATE 10 MG/1
10 TABLET ORAL DAILY
Qty: 90 TABLET | Refills: 3 | Status: SHIPPED | OUTPATIENT
Start: 2024-04-30 | End: 2025-04-25

## 2024-04-30 RX ORDER — LEVOTHYROXINE SODIUM 112 UG/1
112 TABLET ORAL
Qty: 90 TABLET | Refills: 3 | Status: SHIPPED | OUTPATIENT
Start: 2024-04-30

## 2024-04-30 RX ORDER — AMLODIPINE BESYLATE 10 MG/1
10 TABLET ORAL DAILY
Qty: 90 TABLET | Refills: 3 | Status: SHIPPED | OUTPATIENT
Start: 2024-04-30

## 2024-04-30 RX ORDER — LOSARTAN POTASSIUM AND HYDROCHLOROTHIAZIDE 12.5; 1 MG/1; MG/1
1 TABLET ORAL DAILY
Qty: 90 TABLET | Refills: 3 | Status: SHIPPED | OUTPATIENT
Start: 2024-04-30

## 2024-04-30 RX ORDER — ROSUVASTATIN CALCIUM 5 MG/1
5 TABLET, COATED ORAL NIGHTLY
Qty: 90 TABLET | Refills: 3 | Status: SHIPPED | OUTPATIENT
Start: 2024-04-30

## 2024-05-13 DIAGNOSIS — I10 ESSENTIAL HYPERTENSION: ICD-10-CM

## 2024-05-15 RX ORDER — ROSUVASTATIN CALCIUM 5 MG/1
5 TABLET, COATED ORAL NIGHTLY
Qty: 90 TABLET | Refills: 3 | OUTPATIENT
Start: 2024-05-15

## 2024-05-15 RX ORDER — LEVOTHYROXINE SODIUM 112 UG/1
112 TABLET ORAL
Qty: 90 TABLET | Refills: 3 | OUTPATIENT
Start: 2024-05-15

## 2024-05-15 RX ORDER — ESCITALOPRAM OXALATE 10 MG/1
10 TABLET ORAL DAILY
Qty: 90 TABLET | Refills: 3 | OUTPATIENT
Start: 2024-05-15 | End: 2025-05-10

## 2024-05-15 RX ORDER — LOSARTAN POTASSIUM AND HYDROCHLOROTHIAZIDE 12.5; 1 MG/1; MG/1
1 TABLET ORAL DAILY
Qty: 90 TABLET | Refills: 3 | OUTPATIENT
Start: 2024-05-15

## 2024-05-15 RX ORDER — AMLODIPINE BESYLATE 10 MG/1
10 TABLET ORAL DAILY
Qty: 90 TABLET | Refills: 3 | OUTPATIENT
Start: 2024-05-15

## 2024-05-16 ENCOUNTER — PATIENT MESSAGE (OUTPATIENT)
Dept: FAMILY MEDICINE CLINIC | Facility: CLINIC | Age: 64
End: 2024-05-16

## 2024-05-16 DIAGNOSIS — I10 ESSENTIAL HYPERTENSION: ICD-10-CM

## 2024-05-16 RX ORDER — LEVOTHYROXINE SODIUM 112 UG/1
112 TABLET ORAL
Qty: 90 TABLET | Refills: 3 | Status: SHIPPED | OUTPATIENT
Start: 2024-05-16

## 2024-05-16 RX ORDER — LOSARTAN POTASSIUM AND HYDROCHLOROTHIAZIDE 12.5; 1 MG/1; MG/1
1 TABLET ORAL DAILY
Qty: 90 TABLET | Refills: 3 | Status: SHIPPED | OUTPATIENT
Start: 2024-05-16

## 2024-05-16 RX ORDER — AMLODIPINE BESYLATE 10 MG/1
10 TABLET ORAL DAILY
Qty: 90 TABLET | Refills: 3 | Status: SHIPPED | OUTPATIENT
Start: 2024-05-16

## 2024-05-16 RX ORDER — ESCITALOPRAM OXALATE 10 MG/1
10 TABLET ORAL DAILY
Qty: 90 TABLET | Refills: 3 | Status: SHIPPED | OUTPATIENT
Start: 2024-05-16 | End: 2025-05-11

## 2024-05-16 RX ORDER — ROSUVASTATIN CALCIUM 5 MG/1
5 TABLET, COATED ORAL NIGHTLY
Qty: 90 TABLET | Refills: 3 | Status: SHIPPED | OUTPATIENT
Start: 2024-05-16

## 2024-05-16 NOTE — TELEPHONE ENCOUNTER
Anish April, RN 5/16/2024 1:36 PM CDT        ----- Message -----  From: Marissa Delaney  Sent: 5/16/2024 12:45 PM CDT  To: Em Triage Support  Subject: Medication Refills     I have requested refills for all of my medications I take daily. My chart will not send me anything. I have them sent to my home address by [facility].    My home address is [address] [address]    Thanks,  Marissa Delaney  Birthdate [birth year]

## 2024-05-18 DIAGNOSIS — I10 ESSENTIAL HYPERTENSION: ICD-10-CM

## 2024-05-21 RX ORDER — LEVOTHYROXINE SODIUM 112 UG/1
112 TABLET ORAL
Qty: 90 TABLET | Refills: 3 | OUTPATIENT
Start: 2024-05-21

## 2024-05-21 RX ORDER — LOSARTAN POTASSIUM AND HYDROCHLOROTHIAZIDE 12.5; 1 MG/1; MG/1
1 TABLET ORAL DAILY
Qty: 90 TABLET | Refills: 3 | OUTPATIENT
Start: 2024-05-21

## 2024-05-21 RX ORDER — AMLODIPINE BESYLATE 10 MG/1
10 TABLET ORAL DAILY
Qty: 90 TABLET | Refills: 3 | OUTPATIENT
Start: 2024-05-21

## 2024-05-21 RX ORDER — ROSUVASTATIN CALCIUM 5 MG/1
5 TABLET, COATED ORAL NIGHTLY
Qty: 90 TABLET | Refills: 3 | OUTPATIENT
Start: 2024-05-21

## 2024-05-21 RX ORDER — ESCITALOPRAM OXALATE 10 MG/1
10 TABLET ORAL DAILY
Qty: 90 TABLET | Refills: 3 | OUTPATIENT
Start: 2024-05-21 | End: 2025-05-16

## 2024-05-21 NOTE — TELEPHONE ENCOUNTER
Amlodipine 10mg, Escitalopram 10mg, Levothyroxine 112mcg, Losartan-hydrochlorothiazide 100-12.5mg, rosuvastatin 5mg: sent to [facility] pharmacy on 05/16/2024 for 1 year supply.

## 2024-05-22 ENCOUNTER — PATIENT MESSAGE (OUTPATIENT)
Dept: FAMILY MEDICINE CLINIC | Facility: CLINIC | Age: 64
End: 2024-05-22

## 2024-05-22 NOTE — TELEPHONE ENCOUNTER
From: Marissa Delaney  To: Alyx Olivo  Sent: 5/22/2024 3:15 PM CDT  Subject: Medication Refills Marissa Delaney [birth year]    I have been trying to refill all of my medications. [employer] will not send me refills because I have requested them over and over again. They have not sent me anything. I have very little medicine left to take daily. I need these refills sent to my home. My address is [address] [place] [id number]    Please help  Thanks Marissa Delaney

## 2024-05-27 DIAGNOSIS — I10 ESSENTIAL HYPERTENSION: ICD-10-CM

## 2024-05-30 RX ORDER — ROSUVASTATIN CALCIUM 5 MG/1
5 TABLET, COATED ORAL NIGHTLY
Qty: 90 TABLET | Refills: 3 | OUTPATIENT
Start: 2024-05-30

## 2024-05-30 RX ORDER — AMLODIPINE BESYLATE 10 MG/1
10 TABLET ORAL DAILY
Qty: 90 TABLET | Refills: 3 | OUTPATIENT
Start: 2024-05-30

## 2024-05-30 RX ORDER — LEVOTHYROXINE SODIUM 112 UG/1
112 TABLET ORAL
Qty: 90 TABLET | Refills: 3 | OUTPATIENT
Start: 2024-05-30

## 2024-05-30 RX ORDER — LOSARTAN POTASSIUM AND HYDROCHLOROTHIAZIDE 12.5; 1 MG/1; MG/1
1 TABLET ORAL DAILY
Qty: 90 TABLET | Refills: 3 | OUTPATIENT
Start: 2024-05-30

## 2024-05-30 RX ORDER — ESCITALOPRAM OXALATE 10 MG/1
10 TABLET ORAL DAILY
Qty: 90 TABLET | Refills: 3 | OUTPATIENT
Start: 2024-05-30 | End: 2025-05-25

## 2024-05-30 NOTE — TELEPHONE ENCOUNTER
My medications Marissa Delaney  (Newest Message First)   Alyx Olivo MD routed conversation to Em Rn Triage1 hour ago (1:53 PM)     MD Marissa Jimenez1 hour ago (1:53 PM)       Marissa - Your medications were all sent to your mail order on 5/16/2024. No one is refusing your medications. Please contact your mail order as to what is going on. In the future, please call us directly as soon as there is an issue before sending the message below. - Dr. Olivo     This [employer] message has not been read.     Sujey Domnigo routed conversation to Alyx Olivo MD1 hour ago (1:34 PM)     Marissa Delaney  [facility] Customer Response Pool1 hour ago (1:17 PM)       Topic: Website Feedback.     I have no medications left.  Why is this happening!!!!  I could have a stroke or heart attack.  I request my medicine 4 times a year.  You have denied me for the last month or so.  There is no reason for this.  It's not something to get high on!!!   Send me what I am Entitled to.  Do you want to find out someone got sick because you were giving them a hard time??     What is your problem?????   Do you want to make me sick????!!!!     Send the medicine today     Marissa Delaney

## 2024-05-31 ENCOUNTER — TELEPHONE (OUTPATIENT)
Dept: FAMILY MEDICINE CLINIC | Facility: CLINIC | Age: 64
End: 2024-05-31

## 2024-05-31 DIAGNOSIS — I10 ESSENTIAL HYPERTENSION: ICD-10-CM

## 2024-05-31 RX ORDER — LOSARTAN POTASSIUM AND HYDROCHLOROTHIAZIDE 12.5; 1 MG/1; MG/1
1 TABLET ORAL DAILY
Qty: 90 TABLET | Refills: 2 | Status: SHIPPED | OUTPATIENT
Start: 2024-05-31

## 2024-05-31 RX ORDER — ROSUVASTATIN CALCIUM 5 MG/1
5 TABLET, COATED ORAL NIGHTLY
Qty: 90 TABLET | Refills: 2 | Status: SHIPPED | OUTPATIENT
Start: 2024-05-31

## 2024-05-31 RX ORDER — AMLODIPINE BESYLATE 10 MG/1
10 TABLET ORAL DAILY
Qty: 90 TABLET | Refills: 2 | Status: SHIPPED | OUTPATIENT
Start: 2024-05-31

## 2024-05-31 RX ORDER — ESCITALOPRAM OXALATE 10 MG/1
10 TABLET ORAL DAILY
Qty: 90 TABLET | Refills: 0 | Status: SHIPPED | OUTPATIENT
Start: 2024-05-31 | End: 2024-05-31

## 2024-05-31 RX ORDER — LEVOTHYROXINE SODIUM 112 UG/1
112 TABLET ORAL
Qty: 90 TABLET | Refills: 0 | Status: SHIPPED | OUTPATIENT
Start: 2024-05-31 | End: 2024-05-31

## 2024-05-31 RX ORDER — LEVOTHYROXINE SODIUM 112 UG/1
112 TABLET ORAL
Qty: 90 TABLET | Refills: 2 | Status: SHIPPED | OUTPATIENT
Start: 2024-05-31

## 2024-05-31 RX ORDER — ESCITALOPRAM OXALATE 10 MG/1
10 TABLET ORAL DAILY
Qty: 90 TABLET | Refills: 2 | Status: SHIPPED | OUTPATIENT
Start: 2024-05-31 | End: 2025-05-26

## 2024-05-31 RX ORDER — AMLODIPINE BESYLATE 10 MG/1
10 TABLET ORAL DAILY
Qty: 90 TABLET | Refills: 0 | Status: SHIPPED | OUTPATIENT
Start: 2024-05-31 | End: 2024-05-31

## 2024-05-31 RX ORDER — LOSARTAN POTASSIUM AND HYDROCHLOROTHIAZIDE 12.5; 1 MG/1; MG/1
1 TABLET ORAL DAILY
Qty: 90 TABLET | Refills: 0 | Status: SHIPPED | OUTPATIENT
Start: 2024-05-31 | End: 2024-05-31

## 2024-05-31 RX ORDER — ROSUVASTATIN CALCIUM 5 MG/1
5 TABLET, COATED ORAL NIGHTLY
Qty: 90 TABLET | Refills: 0 | Status: SHIPPED | OUTPATIENT
Start: 2024-05-31 | End: 2024-05-31

## 2024-05-31 NOTE — TELEPHONE ENCOUNTER
Dr. Olivo (also routing to pod mate), please advise on refill to local pharmacy. Also requesting 1 year supply to mail order pharmacy, please see telephone encounter 5/31/24. Thank you.    Medications pended for local pharmacy. Will route to provider for review

## 2024-05-31 NOTE — TELEPHONE ENCOUNTER
Per chart review, patient requesting medication to new mail order pharmacy, [facility]    Please see telephone encounter 5/27/24 regarding refills to local pharmacy    Per telephone encounter 5/27/24:  Marissa ISSA 16 hours ago (5:15 PM)   The new prescription information is included in my last message. Would you please forward my current prescriptions to the [facility] in [place] for me to  as soon as possible? Then, refills thereafter should go to [facility] whose fax # is [phone].      Thanks\"    New orders sent to correct pharmacy based on written orders below (only 2 refills included as patient requested one refill to local pharamcy):  Medication Quantity Refills Start End   amLODIPine 10 MG Oral Tab  90 tablet 3 5/16/2024    Sig:   Take 1 tablet (10 mg total) by mouth daily.     Route:   Oral       Medication Quantity Refills Start End   escitalopram (LEXAPRO) 10 MG Oral Tab  90 tablet 3 5/16/2024    Sig:   Take 1 tablet (10 mg total) by mouth daily.     Route:   Oral       Medication Quantity Refills Start End   levothyroxine 112 MCG Oral Tab  90 tablet 3 5/16/2024    Sig:   Take 1 tablet (112 mcg total) by mouth before breakfast.     Route:   Oral       Medication Quantity Refills Start End   Losartan Potassium-HCTZ 100-12.5 MG Oral Tab  90 tablet 3 5/16/2024    Sig:   Take 1 tablet by mouth daily.     Route:   Oral       Medication Quantity Refills Start End   rosuvastatin 5 MG Oral Tab  90 tablet 3 5/16/2024    Sig:   Take 1 tablet (5 mg total) by mouth nightly.     Route:   Oral

## 2024-05-31 NOTE — TELEPHONE ENCOUNTER
Marissa Domingo16 hours ago (5:15 PM)       The new prescription information is included in my last message. Would you please forward my current prescriptions to the [facility] in [place] for me to  as soon as possible? Then, refills thereafter should go to [facility] whose fax # is [phone].     Thanks

## 2024-12-07 ENCOUNTER — LAB ENCOUNTER (OUTPATIENT)
Dept: LAB | Age: 64
End: 2024-12-07
Attending: FAMILY MEDICINE
Payer: MEDICARE

## 2024-12-07 ENCOUNTER — OFFICE VISIT (OUTPATIENT)
Dept: FAMILY MEDICINE CLINIC | Facility: CLINIC | Age: 64
End: 2024-12-07
Payer: MEDICARE

## 2024-12-07 VITALS
HEIGHT: 71 IN | HEART RATE: 97 BPM | DIASTOLIC BLOOD PRESSURE: 78 MMHG | BODY MASS INDEX: 23.15 KG/M2 | WEIGHT: 165.38 LBS | SYSTOLIC BLOOD PRESSURE: 134 MMHG

## 2024-12-07 DIAGNOSIS — F10.20 ALCOHOL DEPENDENCE, CONTINUOUS (HCC): ICD-10-CM

## 2024-12-07 DIAGNOSIS — Z12.31 SCREENING MAMMOGRAM FOR BREAST CANCER: ICD-10-CM

## 2024-12-07 DIAGNOSIS — E03.9 ACQUIRED HYPOTHYROIDISM: ICD-10-CM

## 2024-12-07 DIAGNOSIS — Z87.19 HISTORY OF GI BLEED: ICD-10-CM

## 2024-12-07 DIAGNOSIS — E55.9 VITAMIN D DEFICIENCY: ICD-10-CM

## 2024-12-07 DIAGNOSIS — E78.2 MIXED HYPERLIPIDEMIA: ICD-10-CM

## 2024-12-07 DIAGNOSIS — Z00.00 ENCOUNTER FOR ANNUAL HEALTH EXAMINATION: Primary | ICD-10-CM

## 2024-12-07 DIAGNOSIS — Z12.11 SCREEN FOR COLON CANCER: ICD-10-CM

## 2024-12-07 DIAGNOSIS — Z78.0 POST-MENOPAUSAL: ICD-10-CM

## 2024-12-07 DIAGNOSIS — I10 ESSENTIAL HYPERTENSION: ICD-10-CM

## 2024-12-07 DIAGNOSIS — F39 EPISODIC MOOD DISORDER (HCC): ICD-10-CM

## 2024-12-07 PROBLEM — K25.3 ACUTE GASTRIC ULCER WITHOUT HEMORRHAGE OR PERFORATION: Status: RESOLVED | Noted: 2024-01-09 | Resolved: 2024-12-07

## 2024-12-07 PROBLEM — R42 DIZZINESS: Status: RESOLVED | Noted: 2024-01-09 | Resolved: 2024-12-07

## 2024-12-07 PROBLEM — D64.9 ANEMIA, UNSPECIFIED TYPE: Status: RESOLVED | Noted: 2024-01-09 | Resolved: 2024-12-07

## 2024-12-07 PROBLEM — F10.930 ALCOHOL WITHDRAWAL SYNDROME, UNCOMPLICATED (HCC): Status: RESOLVED | Noted: 2024-01-10 | Resolved: 2024-12-07

## 2024-12-07 PROBLEM — N28.9 RENAL INSUFFICIENCY: Status: RESOLVED | Noted: 2024-01-09 | Resolved: 2024-12-07

## 2024-12-07 PROBLEM — R10.13 EPIGASTRIC PAIN: Status: RESOLVED | Noted: 2024-01-10 | Resolved: 2024-12-07

## 2024-12-07 PROBLEM — E83.52 HYPERCALCEMIA: Status: RESOLVED | Noted: 2024-01-09 | Resolved: 2024-12-07

## 2024-12-07 LAB
ALBUMIN SERPL-MCNC: 5 G/DL (ref 3.2–4.8)
ALBUMIN/GLOB SERPL: 1.4 {RATIO} (ref 1–2)
ALP LIVER SERPL-CCNC: 81 U/L
ALT SERPL-CCNC: 24 U/L
ANION GAP SERPL CALC-SCNC: 11 MMOL/L (ref 0–18)
AST SERPL-CCNC: 37 U/L (ref ?–34)
BASOPHILS # BLD AUTO: 0.06 X10(3) UL (ref 0–0.2)
BASOPHILS NFR BLD AUTO: 0.9 %
BILIRUB SERPL-MCNC: 0.6 MG/DL (ref 0.2–1.1)
BUN BLD-MCNC: 22 MG/DL (ref 9–23)
BUN/CREAT SERPL: 17.2 (ref 10–20)
CALCIUM BLD-MCNC: 10.4 MG/DL (ref 8.7–10.4)
CHLORIDE SERPL-SCNC: 107 MMOL/L (ref 98–112)
CHOLEST SERPL-MCNC: 207 MG/DL (ref ?–200)
CO2 SERPL-SCNC: 24 MMOL/L (ref 21–32)
CREAT BLD-MCNC: 1.28 MG/DL
DEPRECATED RDW RBC AUTO: 54.5 FL (ref 35.1–46.3)
EGFRCR SERPLBLD CKD-EPI 2021: 46 ML/MIN/1.73M2 (ref 60–?)
EOSINOPHIL # BLD AUTO: 0.1 X10(3) UL (ref 0–0.7)
EOSINOPHIL NFR BLD AUTO: 1.6 %
ERYTHROCYTE [DISTWIDTH] IN BLOOD BY AUTOMATED COUNT: 14.9 % (ref 11–15)
FASTING PATIENT LIPID ANSWER: YES
FASTING STATUS PATIENT QL REPORTED: YES
GLOBULIN PLAS-MCNC: 3.6 G/DL (ref 2–3.5)
GLUCOSE BLD-MCNC: 108 MG/DL (ref 70–99)
HCT VFR BLD AUTO: 37.4 %
HDLC SERPL-MCNC: 127 MG/DL (ref 40–59)
HGB BLD-MCNC: 12 G/DL
IMM GRANULOCYTES # BLD AUTO: 0.03 X10(3) UL (ref 0–1)
IMM GRANULOCYTES NFR BLD: 0.5 %
LDLC SERPL CALC-MCNC: 68 MG/DL (ref ?–100)
LYMPHOCYTES # BLD AUTO: 0.76 X10(3) UL (ref 1–4)
LYMPHOCYTES NFR BLD AUTO: 11.9 %
MCH RBC QN AUTO: 31.7 PG (ref 26–34)
MCHC RBC AUTO-ENTMCNC: 32.1 G/DL (ref 31–37)
MCV RBC AUTO: 98.9 FL
MONOCYTES # BLD AUTO: 0.56 X10(3) UL (ref 0.1–1)
MONOCYTES NFR BLD AUTO: 8.8 %
NEUTROPHILS # BLD AUTO: 4.85 X10 (3) UL (ref 1.5–7.7)
NEUTROPHILS # BLD AUTO: 4.85 X10(3) UL (ref 1.5–7.7)
NEUTROPHILS NFR BLD AUTO: 76.3 %
NONHDLC SERPL-MCNC: 80 MG/DL (ref ?–130)
OSMOLALITY SERPL CALC.SUM OF ELEC: 298 MOSM/KG (ref 275–295)
PLATELET # BLD AUTO: 385 10(3)UL (ref 150–450)
POTASSIUM SERPL-SCNC: 5 MMOL/L (ref 3.5–5.1)
PROT SERPL-MCNC: 8.6 G/DL (ref 5.7–8.2)
RBC # BLD AUTO: 3.78 X10(6)UL
SODIUM SERPL-SCNC: 142 MMOL/L (ref 136–145)
T3FREE SERPL-MCNC: 2.97 PG/ML (ref 2.4–4.2)
T4 FREE SERPL-MCNC: 1.4 NG/DL (ref 0.8–1.7)
TRIGL SERPL-MCNC: 70 MG/DL (ref 30–149)
TSI SER-ACNC: 0.04 UIU/ML (ref 0.55–4.78)
VIT B12 SERPL-MCNC: 439 PG/ML (ref 211–911)
VIT D+METAB SERPL-MCNC: 30.3 NG/ML (ref 30–100)
VLDLC SERPL CALC-MCNC: 10 MG/DL (ref 0–30)
WBC # BLD AUTO: 6.4 X10(3) UL (ref 4–11)

## 2024-12-07 PROCEDURE — 80053 COMPREHEN METABOLIC PANEL: CPT

## 2024-12-07 PROCEDURE — 80061 LIPID PANEL: CPT

## 2024-12-07 PROCEDURE — 84443 ASSAY THYROID STIM HORMONE: CPT

## 2024-12-07 PROCEDURE — 84439 ASSAY OF FREE THYROXINE: CPT

## 2024-12-07 PROCEDURE — 85025 COMPLETE CBC W/AUTO DIFF WBC: CPT

## 2024-12-07 PROCEDURE — 82607 VITAMIN B-12: CPT

## 2024-12-07 PROCEDURE — 82306 VITAMIN D 25 HYDROXY: CPT

## 2024-12-07 PROCEDURE — 84481 FREE ASSAY (FT-3): CPT

## 2024-12-07 PROCEDURE — 36415 COLL VENOUS BLD VENIPUNCTURE: CPT

## 2024-12-07 RX ORDER — LOSARTAN POTASSIUM AND HYDROCHLOROTHIAZIDE 12.5; 1 MG/1; MG/1
1 TABLET ORAL DAILY
Qty: 90 TABLET | Refills: 2 | Status: SHIPPED | OUTPATIENT
Start: 2024-12-07

## 2024-12-07 RX ORDER — AMLODIPINE BESYLATE 10 MG/1
10 TABLET ORAL DAILY
Qty: 90 TABLET | Refills: 2 | Status: SHIPPED | OUTPATIENT
Start: 2024-12-07

## 2024-12-07 RX ORDER — LEVOTHYROXINE SODIUM 112 UG/1
112 TABLET ORAL
Qty: 90 TABLET | Refills: 2 | Status: SHIPPED | OUTPATIENT
Start: 2024-12-07

## 2024-12-07 RX ORDER — ROSUVASTATIN CALCIUM 5 MG/1
5 TABLET, COATED ORAL NIGHTLY
Qty: 90 TABLET | Refills: 2 | Status: SHIPPED | OUTPATIENT
Start: 2024-12-07

## 2024-12-07 RX ORDER — ESCITALOPRAM OXALATE 10 MG/1
10 TABLET ORAL DAILY
Qty: 90 TABLET | Refills: 2 | Status: SHIPPED | OUTPATIENT
Start: 2024-12-07 | End: 2025-12-02

## 2024-12-07 NOTE — PROGRESS NOTES
Subjective:   Marissa Delaney is a 64 year old female who presents for a Medicare Wellness Visit charge within the last 11 months and Patient may not meet criteria for AWV: Please evaluate for correct coding and scheduled follow up of multiple significant but stable problems.   Reports walking 3 miles daily. Still drinking about a bottle of wine daily. Appetite has been ok.     History/Other:   Fall Risk Assessment:   She has been screened for Falls and is low risk.      Cognitive Assessment:   Abnormal  What day of the week is this?: Correct  What month is it?: Correct  What year is it?: Incorrect  Recall \"Ball\": Correct  Recall \"Flag\": Correct  Recall \"Tree\": Correct    Functional Ability/Status:   Marissa Delaney has a completely normal functional assessment. See flowsheet for details.        Depression Screening (PHQ):  PHQ-2 SCORE: 0  , done 12/6/2024        Advanced Directives:   She does NOT have a Living Will. [Do you have a living will?: (Patient-Rptd) No]  She does NOT have a Power of  for Health Care. [Do you have a healthcare power of ?: (Patient-Rptd) No]  Discussed Advance Care Planning with patient (and family/surrogate if present). Standard forms made available to patient in After Visit Summary.      Patient Active Problem List   Diagnosis    Essential hypertension    Hypothyroidism    Osteoarthritis of left hip    Hyperlipidemia    Alcohol abuse    History of GI bleed    Renal insufficiency    Acute gastric ulcer without hemorrhage or perforation    Epigastric pain    Alcohol withdrawal syndrome, uncomplicated (HCC)    Alcohol dependence, continuous (HCC)    Episodic mood disorder (HCC)     Allergies:  She has No Known Allergies.    Current Medications:  Outpatient Medications Marked as Taking for the 12/7/24 encounter (Office Visit) with Alyx Olivo MD   Medication Sig    amLODIPine 10 MG Oral Tab Take 1 tablet (10 mg total) by mouth daily.    escitalopram (LEXAPRO) 10 MG Oral Tab Take  1 tablet (10 mg total) by mouth daily.    levothyroxine 112 MCG Oral Tab Take 1 tablet (112 mcg total) by mouth before breakfast.    Losartan Potassium-HCTZ 100-12.5 MG Oral Tab Take 1 tablet by mouth daily.    rosuvastatin 5 MG Oral Tab Take 1 tablet (5 mg total) by mouth nightly.     Current Facility-Administered Medications for the 12/7/24 encounter (Office Visit) with Alyx Olivo MD   Medication    cyanocobalamin (VITAMIN B12) 1000 MCG/ML injection 1,000 mcg       Medical History:  She  has a past medical history of Broken wrist (2012), Disorder of thyroid, Duodenal ulcer, GI bleed, High blood pressure, High cholesterol, Hypothyroidism, Osteoarthritis of left hip (2010), Renal insufficiency (01/09/2024), Unspecified essential hypertension, and Visual impairment.  Surgical History:  She  has a past surgical history that includes hip replacement surgery (Left, 2010); hip replacement surgery (Right, 2013); electrocardiogram, complete (05-); wrist fracture surgery (Left, 2012); other (Right, 09/28/15); total hip replacement; Fracture surgery; and Colon surgery (03/01/2018).   Family History:  Her family history includes Arthritis in her mother; Diabetes in her brother; Heart Disease in her father; Stroke in her paternal grandmother.  Social History:  She  reports that she has never smoked. She has never used smokeless tobacco. She reports current alcohol use of about 7.0 standard drinks of alcohol per week. She reports that she does not use drugs.    Tobacco:  She has never smoked tobacco.    CAGE Alcohol Screen:   CAGE screening score of 0 on 12/6/2024, showing low risk of alcohol abuse.      Patient Care Team:  Alyx Olivo MD as PCP - General (Family Medicine)    Review of Systems     Negative - feeling good.     Objective:   Physical Exam  Constitutional:       Appearance: She is well-developed.   HENT:      Right Ear: Tympanic membrane normal.      Left Ear: Tympanic membrane normal.       Mouth/Throat:      Pharynx: Oropharynx is clear.   Eyes:      Conjunctiva/sclera: Conjunctivae normal.   Cardiovascular:      Rate and Rhythm: Normal rate and regular rhythm.      Heart sounds: Normal heart sounds.   Pulmonary:      Effort: Pulmonary effort is normal.      Breath sounds: Normal breath sounds.   Abdominal:      General: Bowel sounds are normal.      Palpations: Abdomen is soft.   Skin:     General: Skin is warm and dry.   Neurological:      Mental Status: She is alert and oriented to person, place, and time.   Psychiatric:         Behavior: Behavior normal.           /78   Pulse 97   Ht 5' 11\" (1.803 m)   Wt 165 lb 6.4 oz (75 kg)   BMI 23.07 kg/m²  Estimated body mass index is 23.07 kg/m² as calculated from the following:    Height as of this encounter: 5' 11\" (1.803 m).    Weight as of this encounter: 165 lb 6.4 oz (75 kg).    Medicare Hearing Assessment:   Hearing Screening    Time taken: 12/7/2024  9:13 AM  Screening Method: Questionnaire  I have a problem hearing over the telephone: No I have trouble following the conversations when two or more people are talking at the same time: No   I have trouble understanding things on the TV: No I have to strain to understand conversations: No   I have to worry about missing the telephone ring or doorbell: No I have trouble hearing conversations in a noisy background such as a crowded room or restaurant: No   I get confused about where sounds come from: No I misunderstand some words in a sentence and need to ask people to repeat themselves: No   I especially have trouble understanding the speech of women and children: No I have trouble understanding the speaker in a large room such as at a meeting or place of Bahai: No   Many people I talk to seem to mumble (or don't speak clearly): No People get annoyed because I misunderstand what they say: No   I misunderstand what others are saying and make inappropriate responses: No I avoid social  activities because I cannot hear well and fear I will reply improperly: No   Family members and friends have told me they think I may have hearing loss: No             Visual Acuity:   Right Eye Visual Acuity: Uncorrected Right Eye Chart Acuity: 20/100   Left Eye Visual Acuity: Uncorrected Left Eye Chart Acuity: 20/100   Both Eyes Visual Acuity: Uncorrected Both Eyes Chart Acuity: 20/100            Assessment & Plan:   Marissa Delaney is a 64 year old female who presents for a Medicare Assessment.     1. Encounter for annual health examination  Declines vaccines   - [facility] TIFFANIE 2D+3D SCREENING BILAT (CPT=77067/31935); Future  - XR DEXA BONE DENSITOMETRY (CPT=77080); Future    2. Episodic mood disorder (HCC)  Stable on lexapro     3. Alcohol dependence, continuous (HCC)  Encouraged pt to quit. Plans to continue     4. Mixed hyperlipidemia  Stable on statin   - CBC With Differential With Platelet; Future  - Comp Metabolic Panel (14); Future  - Lipid Panel; Future    5. Essential hypertension  Stable on meds   - amLODIPine 10 MG Oral Tab; Take 1 tablet (10 mg total) by mouth daily.  Dispense: 90 tablet; Refill: 2    6. Acquired hypothyroidism  Stable on meds   - TSH W Reflex To Free T4; Future    7. History of GI bleed  Stable     8. Screening mammogram for breast cancer  Overdue. Order [facility]   - [facility] TIFFANIE 2D+3D SCREENING BILAT (CPT=77067/27455); Future    9. Vitamin D deficiency    - Vitamin B12; Future  - Vitamin D; Future    10. Post-menopausal    - XR DEXA BONE DENSITOMETRY (CPT=77080); Future    11. Screen for colon cancer    - GASTRO - INTERNAL    The patient indicates understanding of these issues and agrees to the plan.  Reinforced healthy diet, lifestyle, and exercise.      No follow-ups on file.     Alyx Olivo MD, 12/7/2024     Supplementary Documentation:   General Health:  In the past six months, have you lost more than 10 pounds without trying?: (Patient-Rptd) 2 - No  Has your appetite been poor?:  (Patient-Rptd) No  Type of Diet: (Patient-Rptd) Balanced  How does the patient maintain a good energy level?: (Patient-Rptd) Appropriate Exercise;Daily Walks  How would you describe your daily physical activity?: (Patient-Rptd) Moderate  How would you describe your current health state?: (Patient-Rptd) Good  How do you maintain positive mental well-being?: (Patient-Rptd) Games;Visiting Friends;Visiting Family  On a scale of 0 to 10, with 0 being no pain and 10 being severe pain, what is your pain level?: (Patient-Rptd) 1 - (Mild)  In the past six months, have you experienced urine leakage?: (Patient-Rptd) 0-No  At any time do you feel concerned for the safety/well-being of yourself and/or your children, in your home or elsewhere?: (Patient-Rptd) No  Have you had any immunizations at another office such as Influenza, Hepatitis B, Tetanus, or Pneumococcal?: (Patient-Rptd) Yes    Health Maintenance   Topic Date Due    Colorectal Cancer Screening  Never done    Mammogram  Never done    Pneumococcal Vaccine: 65+ Years (1 of 2 - PCV) Never done    Pap Smear  Never done    Zoster Vaccines (1 of 2) Never done    DEXA Scan  Never done    Annual Physical  10/02/2024    Influenza Vaccine (1) 06/30/2025 (Originally 10/1/2024)    Annual Depression Screening  Completed    Fall Risk Screening (Annual)  Completed    COVID-19 Vaccine  Completed

## 2024-12-08 DIAGNOSIS — I10 ESSENTIAL HYPERTENSION: ICD-10-CM

## 2024-12-11 NOTE — PROGRESS NOTES
Labs are stable overall. Slight improvements in kidney function. Should continue to avoid ibuprofen and keep up with water - and decrease alcohol intake/quit. Slight changes in TSH but other markers are within normal range so will not change dose. - Dr. Olivo

## 2024-12-12 RX ORDER — ESCITALOPRAM OXALATE 10 MG/1
10 TABLET ORAL DAILY
Qty: 90 TABLET | Refills: 2 | OUTPATIENT
Start: 2024-12-12 | End: 2025-12-07

## 2024-12-12 RX ORDER — LOSARTAN POTASSIUM AND HYDROCHLOROTHIAZIDE 12.5; 1 MG/1; MG/1
1 TABLET ORAL DAILY
Qty: 90 TABLET | Refills: 2 | OUTPATIENT
Start: 2024-12-12

## 2024-12-12 RX ORDER — AMLODIPINE BESYLATE 10 MG/1
10 TABLET ORAL DAILY
Qty: 90 TABLET | Refills: 2 | OUTPATIENT
Start: 2024-12-12

## 2024-12-12 RX ORDER — ROSUVASTATIN CALCIUM 5 MG/1
5 TABLET, COATED ORAL NIGHTLY
Qty: 90 TABLET | Refills: 2 | OUTPATIENT
Start: 2024-12-12

## 2024-12-12 RX ORDER — LEVOTHYROXINE SODIUM 112 UG/1
112 TABLET ORAL
Qty: 90 TABLET | Refills: 2 | OUTPATIENT
Start: 2024-12-12

## 2024-12-12 NOTE — TELEPHONE ENCOUNTER
Losartan-hydrochlorothiazide 100-12.5m month supply sent to [facility] on 2024    Rosuvastatin 5m month supply [facility] on [facility]24    Levothyroxine 112mc month supply [facility] on [facility]24    Escitalopram 10m month supply [facility] on 2024    Amlodipine 10m month supply sent [facility] on 2024

## 2024-12-29 DIAGNOSIS — I10 ESSENTIAL HYPERTENSION: ICD-10-CM

## 2025-01-03 RX ORDER — LEVOTHYROXINE SODIUM 112 UG/1
112 TABLET ORAL
Qty: 90 TABLET | Refills: 2 | OUTPATIENT
Start: 2025-01-03

## 2025-01-03 RX ORDER — ROSUVASTATIN CALCIUM 5 MG/1
5 TABLET, COATED ORAL NIGHTLY
Qty: 90 TABLET | Refills: 2 | OUTPATIENT
Start: 2025-01-03

## 2025-01-03 RX ORDER — ESCITALOPRAM OXALATE 10 MG/1
10 TABLET ORAL DAILY
Qty: 90 TABLET | Refills: 2 | OUTPATIENT
Start: 2025-01-03 | End: 2025-12-29

## 2025-01-03 RX ORDER — AMLODIPINE BESYLATE 10 MG/1
10 TABLET ORAL DAILY
Qty: 90 TABLET | Refills: 2 | OUTPATIENT
Start: 2025-01-03

## 2025-01-03 RX ORDER — LOSARTAN POTASSIUM AND HYDROCHLOROTHIAZIDE 12.5; 1 MG/1; MG/1
1 TABLET ORAL DAILY
Qty: 90 TABLET | Refills: 2 | OUTPATIENT
Start: 2025-01-03

## 2025-01-03 NOTE — TELEPHONE ENCOUNTER
Amlodipine 10m month supply [facility] to [facility] on 2024    Escitalopram 10m month supply [facility] on 2024    Levothyroxine 112mc month supply [facility] on 2024    Rosuvastatin 5m month supply [facility] on 2024    Losartan-hydrochlorothiazide 100-12.5m month supply sent [facility] on 2024

## 2025-01-15 DIAGNOSIS — I10 ESSENTIAL HYPERTENSION: ICD-10-CM

## 2025-01-20 RX ORDER — ESCITALOPRAM OXALATE 10 MG/1
10 TABLET ORAL DAILY
Qty: 90 TABLET | Refills: 2 | OUTPATIENT
Start: 2025-01-20 | End: 2026-01-15

## 2025-01-20 RX ORDER — LEVOTHYROXINE SODIUM 112 UG/1
112 TABLET ORAL
Qty: 90 TABLET | Refills: 2 | OUTPATIENT
Start: 2025-01-20

## 2025-01-20 RX ORDER — AMLODIPINE BESYLATE 10 MG/1
10 TABLET ORAL DAILY
Qty: 90 TABLET | Refills: 2 | OUTPATIENT
Start: 2025-01-20

## 2025-01-20 RX ORDER — LOSARTAN POTASSIUM AND HYDROCHLOROTHIAZIDE 12.5; 1 MG/1; MG/1
1 TABLET ORAL DAILY
Qty: 90 TABLET | Refills: 2 | OUTPATIENT
Start: 2025-01-20

## 2025-01-20 RX ORDER — ROSUVASTATIN CALCIUM 5 MG/1
5 TABLET, COATED ORAL NIGHTLY
Qty: 90 TABLET | Refills: 2 | OUTPATIENT
Start: 2025-01-20

## 2025-02-22 DIAGNOSIS — I10 ESSENTIAL HYPERTENSION: ICD-10-CM

## 2025-02-26 RX ORDER — ROSUVASTATIN CALCIUM 5 MG/1
5 TABLET, COATED ORAL NIGHTLY
Qty: 90 TABLET | Refills: 2 | OUTPATIENT
Start: 2025-02-26

## 2025-02-26 RX ORDER — LOSARTAN POTASSIUM AND HYDROCHLOROTHIAZIDE 12.5; 1 MG/1; MG/1
1 TABLET ORAL DAILY
Qty: 90 TABLET | Refills: 2 | OUTPATIENT
Start: 2025-02-26

## 2025-02-26 RX ORDER — AMLODIPINE BESYLATE 10 MG/1
10 TABLET ORAL DAILY
Qty: 90 TABLET | Refills: 2 | OUTPATIENT
Start: 2025-02-26

## 2025-02-26 RX ORDER — LEVOTHYROXINE SODIUM 112 UG/1
112 TABLET ORAL
Qty: 90 TABLET | Refills: 2 | OUTPATIENT
Start: 2025-02-26

## 2025-02-26 RX ORDER — ESCITALOPRAM OXALATE 10 MG/1
10 TABLET ORAL DAILY
Qty: 90 TABLET | Refills: 2 | OUTPATIENT
Start: 2025-02-26 | End: 2026-02-21

## 2025-03-06 DIAGNOSIS — I10 ESSENTIAL HYPERTENSION: ICD-10-CM

## 2025-03-10 RX ORDER — ESCITALOPRAM OXALATE 10 MG/1
10 TABLET ORAL DAILY
Qty: 90 TABLET | Refills: 2 | OUTPATIENT
Start: 2025-03-10 | End: 2026-03-05

## 2025-03-10 RX ORDER — ROSUVASTATIN CALCIUM 5 MG/1
5 TABLET, COATED ORAL NIGHTLY
Qty: 90 TABLET | Refills: 2 | OUTPATIENT
Start: 2025-03-10

## 2025-03-10 RX ORDER — LEVOTHYROXINE SODIUM 112 UG/1
112 TABLET ORAL
Qty: 90 TABLET | Refills: 2 | OUTPATIENT
Start: 2025-03-10

## 2025-03-10 RX ORDER — LOSARTAN POTASSIUM AND HYDROCHLOROTHIAZIDE 12.5; 1 MG/1; MG/1
1 TABLET ORAL DAILY
Qty: 90 TABLET | Refills: 2 | OUTPATIENT
Start: 2025-03-10

## 2025-03-10 RX ORDER — AMLODIPINE BESYLATE 10 MG/1
10 TABLET ORAL DAILY
Qty: 90 TABLET | Refills: 2 | OUTPATIENT
Start: 2025-03-10

## 2025-03-10 NOTE — TELEPHONE ENCOUNTER
Called [facility]- pharmacist stated they did have refills and will get them ready and ship to patient.

## 2025-05-30 DIAGNOSIS — I10 ESSENTIAL HYPERTENSION: ICD-10-CM

## 2025-06-02 RX ORDER — ESCITALOPRAM OXALATE 10 MG/1
10 TABLET ORAL DAILY
Qty: 90 TABLET | Refills: 3 | Status: SHIPPED | OUTPATIENT
Start: 2025-06-02 | End: 2026-05-28

## 2025-06-02 RX ORDER — ROSUVASTATIN CALCIUM 5 MG/1
5 TABLET, COATED ORAL NIGHTLY
Qty: 90 TABLET | Refills: 3 | Status: SHIPPED | OUTPATIENT
Start: 2025-06-02

## 2025-06-02 RX ORDER — AMLODIPINE BESYLATE 10 MG/1
10 TABLET ORAL DAILY
Qty: 90 TABLET | Refills: 3 | Status: SHIPPED | OUTPATIENT
Start: 2025-06-02

## 2025-06-02 RX ORDER — LEVOTHYROXINE SODIUM 112 UG/1
112 TABLET ORAL
Qty: 90 TABLET | Refills: 3 | Status: SHIPPED | OUTPATIENT
Start: 2025-06-02

## 2025-06-02 RX ORDER — LOSARTAN POTASSIUM AND HYDROCHLOROTHIAZIDE 12.5; 1 MG/1; MG/1
1 TABLET ORAL DAILY
Qty: 90 TABLET | Refills: 3 | Status: SHIPPED | OUTPATIENT
Start: 2025-06-02

## 2025-06-26 ENCOUNTER — APPOINTMENT (OUTPATIENT)
Dept: CT IMAGING | Facility: HOSPITAL | Age: 65
End: 2025-06-26
Attending: EMERGENCY MEDICINE
Payer: MEDICARE

## 2025-06-26 ENCOUNTER — HOSPITAL ENCOUNTER (INPATIENT)
Facility: HOSPITAL | Age: 65
LOS: 2 days | Discharge: HOME | End: 2025-06-28
Attending: EMERGENCY MEDICINE | Admitting: HOSPITALIST
Payer: MEDICARE

## 2025-06-26 ENCOUNTER — HOSPITAL ENCOUNTER (OUTPATIENT)
Age: 65
Discharge: ANOTHER HOSPITAL | End: 2025-06-26
Payer: MEDICARE

## 2025-06-26 VITALS
HEART RATE: 80 BPM | TEMPERATURE: 98 F | SYSTOLIC BLOOD PRESSURE: 120 MMHG | DIASTOLIC BLOOD PRESSURE: 98 MMHG | OXYGEN SATURATION: 99 % | RESPIRATION RATE: 18 BRPM

## 2025-06-26 DIAGNOSIS — R55 SYNCOPE, UNSPECIFIED SYNCOPE TYPE: ICD-10-CM

## 2025-06-26 DIAGNOSIS — F10.20 ALCOHOLISM (HCC): ICD-10-CM

## 2025-06-26 DIAGNOSIS — N17.9 AKI (ACUTE KIDNEY INJURY): ICD-10-CM

## 2025-06-26 DIAGNOSIS — R42 DIZZINESS: Primary | ICD-10-CM

## 2025-06-26 DIAGNOSIS — R10.32 LLQ PAIN: ICD-10-CM

## 2025-06-26 DIAGNOSIS — E83.52 HYPERCALCEMIA: Primary | ICD-10-CM

## 2025-06-26 DIAGNOSIS — N30.00 ACUTE CYSTITIS WITHOUT HEMATURIA: ICD-10-CM

## 2025-06-26 LAB
ALBUMIN SERPL-MCNC: 4.9 G/DL (ref 3.2–4.8)
ALBUMIN/GLOB SERPL: 1.7 {RATIO} (ref 1–2)
ALP LIVER SERPL-CCNC: 67 U/L (ref 55–142)
ALT SERPL-CCNC: 12 U/L (ref 10–49)
ANION GAP SERPL CALC-SCNC: 11 MMOL/L (ref 0–18)
AST SERPL-CCNC: 23 U/L (ref ?–34)
ATRIAL RATE: 77 BPM
BASOPHILS # BLD AUTO: 0.04 X10(3) UL (ref 0–0.2)
BASOPHILS NFR BLD AUTO: 0.5 %
BILIRUB SERPL-MCNC: 0.3 MG/DL (ref 0.2–1.1)
BILIRUB UR QL: NEGATIVE
BUN BLD-MCNC: 27 MG/DL (ref 9–23)
BUN/CREAT SERPL: 17.1 (ref 10–20)
CALCIUM BLD-MCNC: 13.1 MG/DL (ref 8.7–10.4)
CHLORIDE SERPL-SCNC: 99 MMOL/L (ref 98–112)
CO2 SERPL-SCNC: 25 MMOL/L (ref 21–32)
CREAT BLD-MCNC: 1.58 MG/DL (ref 0.55–1.02)
DEPRECATED RDW RBC AUTO: 42.6 FL (ref 35.1–46.3)
EGFRCR SERPLBLD CKD-EPI 2021: 36 ML/MIN/1.73M2 (ref 60–?)
EOSINOPHIL # BLD AUTO: 0.05 X10(3) UL (ref 0–0.7)
EOSINOPHIL NFR BLD AUTO: 0.6 %
ERYTHROCYTE [DISTWIDTH] IN BLOOD BY AUTOMATED COUNT: 12.1 % (ref 11–15)
GLOBULIN PLAS-MCNC: 2.9 G/DL (ref 2–3.5)
GLUCOSE BLD-MCNC: 91 MG/DL (ref 70–99)
GLUCOSE UR-MCNC: NORMAL MG/DL
HCT VFR BLD AUTO: 33.6 % (ref 35–48)
HGB BLD-MCNC: 11.5 G/DL (ref 12–16)
HGB UR QL STRIP.AUTO: NEGATIVE
HYALINE CASTS #/AREA URNS AUTO: PRESENT /LPF
IMM GRANULOCYTES # BLD AUTO: 0.03 X10(3) UL (ref 0–1)
IMM GRANULOCYTES NFR BLD: 0.3 %
KETONES UR-MCNC: NEGATIVE MG/DL
LEUKOCYTE ESTERASE UR QL STRIP.AUTO: 250
LYMPHOCYTES # BLD AUTO: 0.71 X10(3) UL (ref 1–4)
LYMPHOCYTES NFR BLD AUTO: 8.1 %
MAGNESIUM SERPL-MCNC: 1.8 MG/DL (ref 1.6–2.6)
MCH RBC QN AUTO: 32.9 PG (ref 26–34)
MCHC RBC AUTO-ENTMCNC: 34.2 G/DL (ref 31–37)
MCV RBC AUTO: 96 FL (ref 80–100)
MONOCYTES # BLD AUTO: 0.79 X10(3) UL (ref 0.1–1)
MONOCYTES NFR BLD AUTO: 9 %
NEUTROPHILS # BLD AUTO: 7.17 X10 (3) UL (ref 1.5–7.7)
NEUTROPHILS # BLD AUTO: 7.17 X10(3) UL (ref 1.5–7.7)
NEUTROPHILS NFR BLD AUTO: 81.5 %
OSMOLALITY SERPL CALC.SUM OF ELEC: 285 MOSM/KG (ref 275–295)
P AXIS: 67 DEGREES
P-R INTERVAL: 182 MS
PH UR: 6 [PH] (ref 5–8)
PLATELET # BLD AUTO: 370 10(3)UL (ref 150–450)
POTASSIUM SERPL-SCNC: 4.3 MMOL/L (ref 3.5–5.1)
PROT SERPL-MCNC: 7.8 G/DL (ref 5.7–8.2)
PROT UR-MCNC: NEGATIVE MG/DL
Q-T INTERVAL: 360 MS
QRS DURATION: 88 MS
QTC CALCULATION (BEZET): 407 MS
R AXIS: 14 DEGREES
RBC # BLD AUTO: 3.5 X10(6)UL (ref 3.8–5.3)
SODIUM SERPL-SCNC: 135 MMOL/L (ref 136–145)
SP GR UR STRIP: 1.02 (ref 1–1.03)
T AXIS: 60 DEGREES
UROBILINOGEN UR STRIP-ACNC: NORMAL
VENTRICULAR RATE: 77 BPM
WBC # BLD AUTO: 8.8 X10(3) UL (ref 4–11)
WBC #/AREA URNS AUTO: >50 /HPF

## 2025-06-26 PROCEDURE — 74177 CT ABD & PELVIS W/CONTRAST: CPT | Performed by: EMERGENCY MEDICINE

## 2025-06-26 PROCEDURE — 99215 OFFICE O/P EST HI 40 MIN: CPT | Performed by: PHYSICIAN ASSISTANT

## 2025-06-26 PROCEDURE — 99223 1ST HOSP IP/OBS HIGH 75: CPT | Performed by: HOSPITALIST

## 2025-06-26 RX ORDER — DOXEPIN HYDROCHLORIDE 50 MG/1
1 CAPSULE ORAL DAILY
Status: DISCONTINUED | OUTPATIENT
Start: 2025-06-26 | End: 2025-06-28

## 2025-06-26 RX ORDER — ACETAMINOPHEN 500 MG
500 TABLET ORAL EVERY 4 HOURS PRN
Status: DISCONTINUED | OUTPATIENT
Start: 2025-06-26 | End: 2025-06-28

## 2025-06-26 RX ORDER — THIAMINE HYDROCHLORIDE 100 MG/ML
100 INJECTION, SOLUTION INTRAMUSCULAR; INTRAVENOUS ONCE
Status: COMPLETED | OUTPATIENT
Start: 2025-06-26 | End: 2025-06-26

## 2025-06-26 RX ORDER — METOCLOPRAMIDE HYDROCHLORIDE 5 MG/ML
5 INJECTION INTRAMUSCULAR; INTRAVENOUS EVERY 8 HOURS PRN
Status: DISCONTINUED | OUTPATIENT
Start: 2025-06-26 | End: 2025-06-28

## 2025-06-26 RX ORDER — FOLIC ACID 1 MG/1
1 TABLET ORAL DAILY
Status: DISCONTINUED | OUTPATIENT
Start: 2025-06-26 | End: 2025-06-28

## 2025-06-26 RX ORDER — HEPARIN SODIUM 5000 [USP'U]/ML
5000 INJECTION, SOLUTION INTRAVENOUS; SUBCUTANEOUS EVERY 12 HOURS SCHEDULED
Status: DISCONTINUED | OUTPATIENT
Start: 2025-06-26 | End: 2025-06-28

## 2025-06-26 RX ORDER — LORAZEPAM 2 MG/1
2 TABLET ORAL
Status: DISCONTINUED | OUTPATIENT
Start: 2025-06-26 | End: 2025-06-28

## 2025-06-26 RX ORDER — MAGNESIUM OXIDE 400 MG/1
400 TABLET ORAL ONCE
Status: COMPLETED | OUTPATIENT
Start: 2025-06-26 | End: 2025-06-26

## 2025-06-26 RX ORDER — LORAZEPAM 1 MG/1
1 TABLET ORAL
Status: DISCONTINUED | OUTPATIENT
Start: 2025-06-26 | End: 2025-06-28

## 2025-06-26 RX ORDER — SODIUM CHLORIDE 9 MG/ML
INJECTION, SOLUTION INTRAVENOUS CONTINUOUS
Status: DISCONTINUED | OUTPATIENT
Start: 2025-06-26 | End: 2025-06-28

## 2025-06-26 RX ORDER — ONDANSETRON 2 MG/ML
4 INJECTION INTRAMUSCULAR; INTRAVENOUS EVERY 6 HOURS PRN
Status: DISCONTINUED | OUTPATIENT
Start: 2025-06-26 | End: 2025-06-28

## 2025-06-26 RX ORDER — MELATONIN
100 DAILY
Status: DISCONTINUED | OUTPATIENT
Start: 2025-06-27 | End: 2025-06-28

## 2025-06-26 NOTE — PROGRESS NOTES
Antimicrobial Dose Adjustment for Ceftriaxone    Indication:  Moderate dose (pyelonephritis, pneumonia, intra-abdominal infection, bacteremia, bone/joint infection)  Anthropometrics:  Wt Readings from Last 1 Encounters:   06/26/25 68 kg (150 lb)         Ceftriaxone adjusted per P&T protocol.

## 2025-06-26 NOTE — H&P
[facility]    PATIENT'S NAME: ROSEMARY CLEARY   ATTENDING PHYSICIAN: Francis Jarvis MD   PATIENT ACCOUNT#:   [id number]    LOCATION:  [place] [address]  MEDICAL RECORD #:   [id number]       YOB: 1960  ADMISSION DATE:       06/26/2025    HISTORY AND PHYSICAL EXAMINATION    CHIEF COMPLAINT:  Presyncope, acute kidney injury, hypercalcemia.    HISTORY OF PRESENT ILLNESS:  Patient is a 65-year-old  female, came into the emergency department because of lightheadedness and presyncopal episode earlier today.  CBC showed no leukocytosis or left shift.  Hemoglobin 11.5 which is roughly around her baseline.  Chemistry today showed GFR of 36 which is below her baseline.  BUN 27 and creatinine 1.58.  Calcium 13.1.  CT scan of the abdomen and pelvis still pending.  EKG showed normal sinus rhythm.  No acute ST-T changes.    PAST MEDICAL HISTORY:  Hypertension, hypothyroidism, generalized osteoarthritis, hyperlipidemia, chronic kidney disease stage 3, NSAID-induced peptic ulcer disease and alcohol use.    PAST SURGICAL HISTORY:  Bilateral total hip arthroplasty, left wrist open reduction internal fixation, and right patella open reduction internal fixation from prior multiple falls.  History of pyloric stricture secondary to peptic ulcer disease requiring laparoscopic pyloroplasty for gastric outlet obstruction.  She had history of perforated gastric ulcer requiring laparoscopic repair.    MEDICATIONS:  Please see medication reconciliation list.    FAMILY HISTORY:  Positive for heart disease.    SOCIAL HISTORY:  Heavy alcohol consumption daily.  She drinks at least a bottle of wine.  No tobacco or drug use.  Lives with her .  At baseline, independent in her basic activities of daily living.    REVIEW OF SYSTEMS:  Patient reports on and off lightheadedness and dizziness.  She consumes a bottle of wine on a daily basis and has not been having much appetite.  She had chronic dyspepsia, and  sometimes, she has dry heaving, but no vomiting.  She cannot comment on weight loss.  Last drink was last night.  Today, she was standing up, felt lightheaded, dizzy, and almost passed out.  Other 12-point review of systems is negative.  Patient had a similar presentation to the emergency room in January 2024 with hypercalcemia and dehydration.  At that time, intact PTH was normal.  The patient left against medical advice and never followed up with Gastroenterology.  She had a CT scan of the abdomen and pelvis during that ER visit in January 2024 which was suspicious for gastric large 2.7 cm ulcer along the gastric body, greater curvature.      PHYSICAL EXAMINATION:    GENERAL:  Alert and oriented to time, place and person.  Mild distress.  Flat affect.  VITAL SIGNS:  Temperature 98.2, pulse 80, respiratory rate 18, blood pressure 100/63.  Upon arrival, pulse ox 99% on room air.  HEENT:  Atraumatic.  Oropharynx clear.  Dry mucous membranes.  Ears, nose normal.  Eyes:  Anicteric sclerae.  NECK:  Supple.  No lymphadenopathy.  Trachea midline.  LUNGS:  Clear to auscultation bilaterally.  Normal respiratory effort.  HEART:  Regular rate and rhythm.  S1 and S2 auscultated.  No murmur.   ABDOMEN:  Soft, nondistended.  No tenderness.  Positive bowel sounds.  EXTREMITIES:  No edema, clubbing, or cyanosis.  NEUROLOGIC:  Motor and sensory intact.    ASSESSMENT:    1.   Hypercalcemia and acute kidney injury, most likely related to dehydration.  2.   History of peptic ulcer disease with symptoms of dyspepsia and poor oral intake.  3.   Acute on chronic kidney injury and dehydration.  4.   Presyncope secondary to orthostatic hypotension.    PLAN:  Patient will be admitted to general medical floor.  Hold blood pressure medications.  Aggressive hydration.  Start her on IV Protonix.  Clear liquid diet.  Obtain Gastroenterology consult.  Follow up on CT scan of the abdomen and pelvis.  Fall precautions.  Detox protocol for  alcoholism and anticipated alcohol withdrawal.  Obtain magnesium level.  Further recommendations to follow.    Dictated By [place] MD  d: 06/26/2025 14:10:27  t: 06/26/2025 15:19:56  [facility] [id number]  /

## 2025-06-26 NOTE — ED INITIAL ASSESSMENT (HPI)
Pt c/o dizziness x 3 weeks ago. PT reports the dizziness \"It just got worse\". Pt denies headaches, vision changes, numbness/tingling to arms or legs, denies weakness. Pt denies cp/sob.     Pt c/o left sided abd pain and nausea. Denies diarrhea. Denies gu s/s.

## 2025-06-26 NOTE — ED INITIAL ASSESSMENT (HPI)
Dizziness for 3 weeks with left sided abd pain. Pt states multiple falls with syncopal episodes the last 3 weeks.  +nausea. No vomiting. Pt states she feels off balance.   states that she gets unsteady and he is there to prevent her from falling.

## 2025-06-26 NOTE — ED QUICK NOTES
Orders for admission, patient is aware of plan and ready to go upstairs. Any questions, please call ED RN mami  at extension [phone].   Chief Complaint   Patient presents with    Dizziness    Abdomen/Flank Pain       Patient AO x 3  Ambulation: ambulation  Belongings: accompanying patient  Medications: see mar   IV: 20g lac  Language: english  COVID-19 suspicion level/status: na  CIWA SCORE: na  NIH: na  Other pertinent information:  na

## 2025-06-26 NOTE — ED PROVIDER NOTES
Patient Seen in: [facility] Department    History     Chief Complaint   Patient presents with    Dizziness    Abdomen/Flank Pain       HPI    65-year-old female presents to the emergency department for evaluation of presyncope symptoms for 3 weeks, intermittent left flank and left lower quadrant discomfort.  Patient states that she has been having some episodes of the pain and occasional nausea in the left lower quadrant.  She has also been having difficulty when she first stands up.  She will feel lightheaded like she may pass out and has actually fallen down twice according to her  who is at bedside.  She has not had syncope.  She states these episodes only occur when she goes from lying down to standing or from sitting to standing.  She does not get dizziness any other time.  No room spinning sensation.  No palpitations or chest pain.    History from Independent Source: History as stated in hospitals    External Records Reviewed: On chart review, patient was seen in the hospital for peptic ulcer disease, alcohol abuse, blood loss, TIMMY, hypercalcemia, in January of this year.    History reviewed. Past Medical History[1]    History reviewed. Past Surgical History[2]      Medications :  Prescriptions Prior to Admission[3]     Family History[4]    Smoking Status: Social Hx on file[5]    Constitutional and vital signs reviewed.      Social History and Family History elements reviewed from today, pertinent positives to the presenting problem noted.    Physical Exam     ED Triage Vitals [06/26/25 1125]   /63   Pulse 85   Resp 16   Temp 97.7 °F (36.5 °C)   Temp src Temporal   SpO2 99 %   O2 Device None (Room air)       Physical Exam   Constitutional: AAOx3, well nourished, NAD  HEENT: Normocephalic, PERRLA, MMM  CV: s1s2+, RRR, no m/r/g, normal distal pulses  Pulmonary/Chest: CTA b/l with no rales, wheezes.  No chest wall tenderness  Abdominal: Nontender.  Nondistended. Soft. Bowel sounds are  normal.   Neck/Back:   :   Musculoskeletal: Normal range of motion. No deformity.   Neurological: Awake, alert. Normal reflexes. No cranial nerve deficit.    Skin: Skin is warm and dry. No rash noted. No erythema.   Psychiatric:      All measures to prevent infection transmission during my interaction with the patient were taken. The patient was already wearing a droplet mask on my arrival to the room. Personal protective equipment was worn throughout the duration of the exam.      ED Course        Labs Reviewed   URINALYSIS WITH CULTURE REFLEX - Abnormal; Notable for the following components:       Result Value    Clarity Urine Turbid (*)     Nitrite Urine 2+ (*)     Leukocyte Esterase Urine 250 (*)     WBC Urine >50 (*)     Bacteria Urine 1+ (*)     Squamous Epi. Cells Few (*)     Hyaline Casts Present (*)     All other components within normal limits   CBC WITH DIFFERENTIAL WITH PLATELET - Abnormal; Notable for the following components:    RBC 3.50 (*)     HGB 11.5 (*)     HCT 33.6 (*)     Lymphocyte Absolute 0.71 (*)     All other components within normal limits   COMP METABOLIC PANEL (14) - Abnormal; Notable for the following components:    Sodium 135 (*)     BUN 27 (*)     Creatinine 1.58 (*)     Calcium, Total 13.1 (*)     eGFR-Cr 36 (*)     Albumin 4.9 (*)     All other components within normal limits   RAINBOW DRAW LAVENDER   RAINBOW DRAW LIGHT GREEN   URINE CULTURE, ROUTINE     My Independent Interpretation of EKG (if performed): EKG    Rate, intervals and axes as noted on EKG Report.  Rate: 77 bpm  Rhythm: Sinus Rhythm  Reading: Normal sinus rhythm, no acute ST changes, normal axis and intervals, no ectopy             Monitor Interpretation:   normal sinus rhythm as interpreted by me.      Imaging Results Available and Reviewed while in ED: No results found.  ED Medications Administered:   Medications   cefTRIAXone (Rocephin) 1 g in sodium chloride 0.9% 100 mL IVPB-ADDV (has no administration in time  range)   sodium chloride 0.9 % IV bolus 1,000 mL (has no administration in time range)   sodium chloride 0.9 % IV bolus 1,000 mL (0 mL Intravenous Stopped 6/26/25 1349)             MDM     Vitals:    06/26/25 1125   BP: 100/63   Pulse: 85   Resp: 16   Temp: 97.7 °F (36.5 °C)   TempSrc: Temporal   SpO2: 99%   Weight: 68 kg   Height: 180.3 cm (5' 11\")     *I personally reviewed and interpreted all ED vitals.    Independent Interpretation of Studies:     Social Determinants of Health:     Procedures:      Differential/MDM/Shared Decision Making: Differential Diagnosis includes dehydration, electrolyte abnormality, infection, TIMMY, diverticulitis, dysrhythmia, others.      The patient already  has a past medical history of Broken wrist (2012), Disorder of thyroid, Duodenal ulcer, GI bleed, High blood pressure, High cholesterol, Hypothyroidism, Osteoarthritis of left hip (2010), Renal insufficiency (01/09/2024), Unspecified essential hypertension, and Visual impairment.  to contribute to the complexity of this ED evaluation.           Medications, Diagnostics, or Disposition considered but not done:     Patient does have urinary tract infection without any left flank tenderness to palpation or CVA tenderness percussion on lab work, patient has urinary tract infection as well as TIMMY and hypercalcemia..  Management of case was discussed with patient and she is willing to stay in the hospital for further management.  I have ordered 2 L IV fluid bolus and IV ceftriaxone.  [facility].  Patient admits that she is still drinking 1 bottle of wine daily.    Critical care time exclusive of procedure time spent on this patient was 35 min for taking history from patient examining patient, medical decision-making, reviewing lab work and radiology studies, explaining results to patient and family, discussing with consultants/admitting physician, and documenting in  patient's chart.      Condition upon leaving the department: Stable    Disposition and Plan     Clinical Impression:  1. Hypercalcemia    2. Acute cystitis without hematuria    3. TIMMY (acute kidney injury)    4. Alcoholism (HCC)        Disposition:  Admit    Follow-up:  No follow-up provider specified.    Medications Prescribed:  Current Discharge Medication List          Hospital Problems       Present on Admission  Date Reviewed: 12/7/2024          ICD-10-CM Noted POA    * (Principal) Hypercalcemia E83.52 6/26/2025 Unknown               [1]   Past Medical History:   Broken wrist    per [employer]:  \"Management:  orif left wrist\"    Disorder of thyroid    Duodenal ulcer    GI bleed    High blood pressure    High cholesterol    Hypothyroidism    Osteoarthritis of left hip    Renal insufficiency    Unspecified essential hypertension    Visual impairment   [2]   Past Surgical History:  Procedure Laterality Date    Colon surgery  03/01/2018    Electrocardiogram, complete  05-    Scanned to Media Tab - Date of Service 05-    Fracture surgery      Hip replacement surgery Left 2010    left hip replacement    Hip replacement surgery Right 2013    right hip replacement    Other Right 09/28/15    ORIF patella    Total hip replacement      Wrist fracture surgery Left 2012   [3] (Not in a hospital admission)   [4]   Family History  Problem Relation Age of Onset    Arthritis Mother     Heart Disease Father         Valve repair    Stroke Paternal Grandmother     Diabetes Brother    [5]   Social History  Socioeconomic History    Marital status:    Tobacco Use    Smoking status: Never    Smokeless tobacco: Never    Tobacco comments:     Never smoked   Vaping Use    Vaping status: Never Used   Substance and Sexual Activity    Alcohol use: Yes     Alcohol/week: 7.0 standard drinks of alcohol     Types: 7 Glasses of wine per week     Comment: few days a week    Drug use: Never   Other Topics Concern    Caffeine  Concern Yes     Comment: Coffee, occasionally

## 2025-06-26 NOTE — ED PROVIDER NOTES
Patient Seen in: [facility]        History  Chief Complaint   Patient presents with    Dizziness     Stated Complaint: Upset Stomach, Dizzy, Nausea    Subjective:   HPI          Patient is a 65-year-old female with a history of hypertension, hyperlipidemia, hypothyroidism who presents to the immediate care with her  for evaluation of dizziness, left lower quadrant abdominal pain, presyncopal episodes that been ongoing for the past several weeks.  Patient states that she feels generally unsteady on her feet but does not describe vertiginous type dizziness such as the room spinning around her.  She has also intermittently been having presyncopal episodes where she feels like she is going to pass out and she catches herself or her  also helps capture.  She also reports intermittent left lower quadrant pain that comes and goes but is not necessarily associated with any motions or activities or eating.  She denies any urinary symptoms, vomiting, diarrhea, chest pain, shortness of breath, fevers or chills.  No numbness or weakness or loss of sensation arms or legs or vision changes or headaches.  She denies prior similar episodes in the past.      Objective:     Past Medical History:    Broken wrist    per [employer]:  \"Management:  orif left wrist\"    Disorder of thyroid    Duodenal ulcer    GI bleed    High blood pressure    High cholesterol    Hypothyroidism    Osteoarthritis of left hip    Renal insufficiency    Unspecified essential hypertension    Visual impairment              Past Surgical History:   Procedure Laterality Date    Colon surgery  03/01/2018    Electrocardiogram, complete  05-    Scanned to Media Tab - Date of Service 05-    Fracture surgery      Hip replacement surgery Left 2010    left hip replacement    Hip replacement surgery Right 2013    right hip replacement    Other Right 09/28/15    ORIF patella    Total hip replacement      Wrist fracture surgery Left  2012                Social History     Socioeconomic History    Marital status:    Tobacco Use    Smoking status: Never    Smokeless tobacco: Never    Tobacco comments:     Never smoked   Vaping Use    Vaping status: Never Used   Substance and Sexual Activity    Alcohol use: Yes     Alcohol/week: 7.0 standard drinks of alcohol     Types: 7 Glasses of wine per week     Comment: few days a week    Drug use: Never   Other Topics Concern    Caffeine Concern Yes     Comment: Coffee, occasionally     Social Drivers of Health     Food Insecurity: No Food Insecurity (1/9/2024)    Food Insecurity     Food Insecurity: Never true   Transportation Needs: No Transportation Needs (1/9/2024)    Transportation Needs     Lack of Transportation: No   Housing Stability: Low Risk  (1/9/2024)    Housing Stability     Housing Instability: No              Review of Systems   Constitutional:  Negative for fever.   Respiratory:  Negative for shortness of breath.    Cardiovascular:  Negative for chest pain.   Gastrointestinal:  Positive for abdominal pain and nausea. Negative for blood in stool, constipation, diarrhea and vomiting.   Neurological:  Positive for dizziness, syncope and light-headedness. Negative for seizures, numbness and headaches.       Positive for stated complaint: Upset Stomach, Dizzy, Nausea  Other systems are as noted in HPI.  Constitutional and vital signs reviewed.      All other systems reviewed and negative except as noted above.                  Physical Exam    ED Triage Vitals [06/26/25 1135]   BP (!) 120/98   Pulse 80   Resp 18   Temp 98.2 °F (36.8 °C)   Temp src Oral   SpO2 99 %   O2 Device None (Room air)       Current Vitals:   No data recorded        Physical Exam  Vitals and nursing note reviewed.   Constitutional:       General: She is not in acute distress.     Appearance: Normal appearance. She is not ill-appearing.   HENT:      Head: Normocephalic and atraumatic.      Right Ear: External ear  normal.      Left Ear: External ear normal.      Mouth/Throat:      Mouth: Mucous membranes are moist.      Pharynx: Oropharynx is clear.   Eyes:      General: No scleral icterus.     Extraocular Movements: Extraocular movements intact.      Conjunctiva/sclera: Conjunctivae normal.      Pupils: Pupils are equal, round, and reactive to light.   Cardiovascular:      Rate and Rhythm: Normal rate and regular rhythm.      Heart sounds: Normal heart sounds. No murmur heard.     No friction rub. No gallop.   Pulmonary:      Effort: Pulmonary effort is normal. No respiratory distress.      Breath sounds: Normal breath sounds. No stridor. No wheezing, rhonchi or rales.   Abdominal:      General: Bowel sounds are normal. There is no distension.      Palpations: Abdomen is soft. There is no mass.      Tenderness: There is abdominal tenderness in the left lower quadrant. There is no guarding or rebound.      Comments: Minimal left lower quadrant tenderness.  No guarding or rebound.   Musculoskeletal:      Cervical back: Normal range of motion and neck supple. No tenderness.   Skin:     General: Skin is warm and dry.   Neurological:      General: No focal deficit present.      Mental Status: She is alert and oriented to person, place, and time. Mental status is at baseline.      Cranial Nerves: Cranial nerves 2-12 are intact. No cranial nerve deficit, dysarthria or facial asymmetry.      Sensory: Sensation is intact. No sensory deficit.      Motor: Motor function is intact. No weakness or pronator drift.      Coordination: Coordination is intact.      Gait: Gait is intact.   Psychiatric:         Mood and Affect: Mood normal.         Behavior: Behavior normal.                 ED Course  Labs Reviewed - No data to display                         MDM  Patient is a 65-year-old female with a history of hypertension, hyperlipidemia, hypothyroidism who presents to the immediate care with her  for evaluation of dizziness, left  lower quadrant abdominal pain and presyncopal symptoms that been ongoing intermittently for the past several weeks.  Patient provides a history.  She presents to the immediate care well-appearing with stable vital signs.  Physical exam shows minimal left lower quadrant tenderness but no peritoneal signs and she has no focal neurologic deficits.  Discussed with patient and  that her symptoms are concerning and can have a broad differential of possible causes.  Recommend she goes to the ER for further evaluation and management of her symptoms as she appears to warrant a higher level of care.  Patient had and  expressed understanding and agreed to go to [facility].  EMS transport declined and  will bring her now.  Patient was discussed with supervising physician, Dr. Aleksandr Dick.        Medical Decision Making      Disposition and Plan     Clinical Impression:  1. Dizziness    2. Syncope, unspecified syncope type    3. LLQ pain         Disposition:  Ic to ed  6/26/2025 10:48 am    Follow-up:  Alyx Olivo MD  [address] [place] [id number]  [phone]    Schedule an appointment as soon as possible for a visit             Medications Prescribed:  Discharge Medication List as of 6/26/2025 10:55 AM                Supplementary Documentation:

## 2025-06-27 ENCOUNTER — APPOINTMENT (OUTPATIENT)
Dept: CT IMAGING | Facility: HOSPITAL | Age: 65
End: 2025-06-27
Attending: HOSPITALIST
Payer: MEDICARE

## 2025-06-27 LAB
ANION GAP SERPL CALC-SCNC: 9 MMOL/L (ref 0–18)
BASOPHILS # BLD AUTO: 0.05 X10(3) UL (ref 0–0.2)
BASOPHILS NFR BLD AUTO: 1 %
BUN BLD-MCNC: 18 MG/DL (ref 9–23)
BUN/CREAT SERPL: 15.5 (ref 10–20)
CALCIUM BLD-MCNC: 9.9 MG/DL (ref 8.7–10.4)
CHLORIDE SERPL-SCNC: 106 MMOL/L (ref 98–112)
CO2 SERPL-SCNC: 25 MMOL/L (ref 21–32)
CREAT BLD-MCNC: 1.16 MG/DL (ref 0.55–1.02)
DEPRECATED HBV CORE AB SER IA-ACNC: 24 NG/ML (ref 50–306)
DEPRECATED RDW RBC AUTO: 45.8 FL (ref 35.1–46.3)
EGFRCR SERPLBLD CKD-EPI 2021: 52 ML/MIN/1.73M2 (ref 60–?)
EOSINOPHIL # BLD AUTO: 0.08 X10(3) UL (ref 0–0.7)
EOSINOPHIL NFR BLD AUTO: 1.7 %
ERYTHROCYTE [DISTWIDTH] IN BLOOD BY AUTOMATED COUNT: 12.2 % (ref 11–15)
GLUCOSE BLD-MCNC: 87 MG/DL (ref 70–99)
HCT VFR BLD AUTO: 30.1 % (ref 35–48)
HEMOCCULT STL QL: POSITIVE
HGB BLD-MCNC: 9.9 G/DL (ref 12–16)
IMM GRANULOCYTES # BLD AUTO: 0.01 X10(3) UL (ref 0–1)
IMM GRANULOCYTES NFR BLD: 0.2 %
IRON SATN MFR SERPL: 40 % (ref 15–50)
IRON SERPL-MCNC: 118 UG/DL (ref 50–170)
LYMPHOCYTES # BLD AUTO: 0.8 X10(3) UL (ref 1–4)
LYMPHOCYTES NFR BLD AUTO: 16.6 %
MAGNESIUM SERPL-MCNC: 1.2 MG/DL (ref 1.6–2.6)
MCH RBC QN AUTO: 33.7 PG (ref 26–34)
MCHC RBC AUTO-ENTMCNC: 32.9 G/DL (ref 31–37)
MCV RBC AUTO: 102.4 FL (ref 80–100)
MONOCYTES # BLD AUTO: 0.75 X10(3) UL (ref 0.1–1)
MONOCYTES NFR BLD AUTO: 15.6 %
NEUTROPHILS # BLD AUTO: 3.12 X10 (3) UL (ref 1.5–7.7)
NEUTROPHILS # BLD AUTO: 3.12 X10(3) UL (ref 1.5–7.7)
NEUTROPHILS NFR BLD AUTO: 64.9 %
OSMOLALITY SERPL CALC.SUM OF ELEC: 291 MOSM/KG (ref 275–295)
PLATELET # BLD AUTO: 286 10(3)UL (ref 150–450)
POTASSIUM SERPL-SCNC: 3.3 MMOL/L (ref 3.5–5.1)
PTH-INTACT SERPL-MCNC: 18.8 PG/ML (ref 18.5–88)
RBC # BLD AUTO: 2.94 X10(6)UL (ref 3.8–5.3)
SODIUM SERPL-SCNC: 140 MMOL/L (ref 136–145)
TOTAL IRON BINDING CAPACITY: 295 UG/DL (ref 250–425)
TRANSFERRIN SERPL-MCNC: 243 MG/DL (ref 250–380)
TSI SER-ACNC: 0.63 UIU/ML (ref 0.55–4.78)
VIT D+METAB SERPL-MCNC: 35.6 NG/ML (ref 30–100)
WBC # BLD AUTO: 4.8 X10(3) UL (ref 4–11)

## 2025-06-27 PROCEDURE — 70450 CT HEAD/BRAIN W/O DYE: CPT | Performed by: HOSPITALIST

## 2025-06-27 PROCEDURE — 99233 SBSQ HOSP IP/OBS HIGH 50: CPT | Performed by: HOSPITALIST

## 2025-06-27 PROCEDURE — 99222 1ST HOSP IP/OBS MODERATE 55: CPT | Performed by: INTERNAL MEDICINE

## 2025-06-27 RX ORDER — ESCITALOPRAM OXALATE 10 MG/1
10 TABLET ORAL DAILY
Status: DISCONTINUED | OUTPATIENT
Start: 2025-06-27 | End: 2025-06-28

## 2025-06-27 RX ORDER — AMLODIPINE BESYLATE 10 MG/1
10 TABLET ORAL DAILY
Status: DISCONTINUED | OUTPATIENT
Start: 2025-06-27 | End: 2025-06-28

## 2025-06-27 RX ORDER — ROSUVASTATIN CALCIUM 5 MG/1
5 TABLET, COATED ORAL EVERY MORNING
Status: DISCONTINUED | OUTPATIENT
Start: 2025-06-27 | End: 2025-06-28

## 2025-06-27 RX ORDER — LEVOTHYROXINE SODIUM 112 UG/1
112 TABLET ORAL
Status: DISCONTINUED | OUTPATIENT
Start: 2025-06-27 | End: 2025-06-28

## 2025-06-27 NOTE — PROGRESS NOTES
[facility]  part of [facility]    Progress Note    Marissa Delaney Patient Status:  Inpatient    1960 MRN [id number]   Location [facility] 5SW/SE Attending Grzegorz Rubio MD   Hosp Day # 1 PCP Alyx Olivo MD     Chief Complaint:   Chief Complaint   Patient presents with    Dizziness    Abdomen/Flank Pain   Dizziness for 3 weeks, falls     Subjective:   Marissa Delaney is asking to go home. She feels better. Says all her symptoms have improved. No further LLQ abd pain, no N/V. Says dizziness better. Took tums on Thursday for upset stomach.   She has had 3 weeks of abd pain, dizziness, nausea no vomiting no diarrhea no F/C. She has felt \"wobbly\" and has fallen several times     Scoring 1-3 on CIWA overnight     Objective:   Objective:    Blood pressure 125/66, pulse 80, temperature 98.9 °F (37.2 °C), temperature source Oral, resp. rate 18, height 5' 10.98\" (1.803 m), weight 150 lb (68 kg), SpO2 95%, not currently breastfeeding.    Physical Exam:    General: No acute distress. Seems anxious  Respiratory: Clear to auscultation bilaterally. No wheezes. No rhonchi.  Cardiovascular: S1, S2. Regular rate and rhythm. No murmurs, rubs or gallops.   Abdomen: Soft, nontender, nondistended.  Positive bowel sounds. No rebound or guarding.  Neurologic: No focal neurological deficits.   Musculoskeletal: Moves all extremities.  Extremities: No edema.      Results:   Results:    Labs:  Recent Labs   Lab 25  1221 25  0630   WBC 8.8 4.8   HGB 11.5* 9.9*   MCV 96.0 102.4*   .0 286.0       Recent Labs   Lab 25  1221 25  0630   GLU 91 87   BUN 27* 18   CREATSERUM 1.58* 1.16*   CA 13.1* 9.9   ALB 4.9*  --    * 140   K 4.3 3.3*   CL 99 106   CO2 25.0 25.0   ALKPHO 67  --    AST 23  --    ALT 12  --    BILT 0.3  --    TP 7.8  --        Estimated Creatinine Clearance: 51.2 mL/min (A) (based on SCr of 1.16 mg/dL (H)).    No results for input(s): \"PTP\", \"INR\" in the last 168  hours.         Culture:  No results found for this visit on 06/26/25.    Cardiac  No results for input(s): \"TROP\", \"PBNP\" in the last 168 hours.      Imaging: Imaging data reviewed in [facility].  CT ABDOMEN+PELVIS(CONTRAST ONLY)(CPT=74177)  Result Date: 6/26/2025  CONCLUSION: No CT evidence of acute abnormality within the abdomen/pelvis. Incidental nonacute findings are present and are described within the body of the report. Electronically Verified and Signed by Attending Radiologist: Kurtis Menjivar MD 6/26/2025 7:48 PM Workstation: [employer]      Medications: Scheduled Medications[1]      Assessment and Plan:   Assessment & Plan:      TIMMY on CKD III   Hypercalcemia   Likely prerenal azotemia from dehydration   BUN/Creat improving with IVF   Baseline creat 1.2 per EMR   Calcium better likely elevated from tums/dehydration. Check PTH vitamin D  Cont IVF.   UA possible UTI   Hold losartan HTCZ  E coli UTI   IV rocpehin empirically   Ucx E.coli. sens pending  Dizziness  Check orthostatic V/S   IVF.   Likely from TIMMY/dehydration, UTI, ETOH abuse   CTH   PT/OT   ETOH abuse   CIWA protocol  PRN ativan   MV, Thiamine, FA  Psych consult.   LLQ abd pain   Acute on chronic anemia   ? Dilutional.   No overt bleeding seen.   Iron panel, ferritin.   GI on consult.   CT A/P neg.   H/o PUD   PPI     Other medical problems  Depression   Hyperlipidemia   Hypothyroidism   Vitamin D def.        MDM: High  I personally spent time on chart/note review, review of labs/imaging, discussion with patient, physical exam, discussion with staff, consultants, coordinating care, writing progress note, and discussion of plan of care.        Plan of care discussed with patient or family at bedside.    Grzegorz Rubio MD  Hospitalist          Supplementary Documentation:     Quality:  DVT Prophylaxis: heparin   CODE status: Full   Dispo: per clinical course            Estimated date of discharge: TBD  Discharge is dependent on: clinical stability  At  this point Ms. Delaney is expected to be discharge to: TBD        **Certification      PHYSICIAN Certification of Need for Inpatient Hospitalization - Initial Certification    Patient will require inpatient services that will reasonably be expected to span two midnight's based on the clinical documentation in H+P.   Based on patients current state of illness, I anticipate that, after discharge, patient will require TBD.               [1]    heparin  5,000 Units Subcutaneous 2 times per day    pantoprazole  40 mg Intravenous Daily    thiamine  100 mg Oral Daily    multivitamin  1 tablet Oral Daily    folic acid  1 mg Oral Daily    cefTRIAXone  2 g Intravenous Q24H

## 2025-06-27 NOTE — CONSULTS
[facility]  part of [facility]    Report of Consultation    Marissa Delaney Patient Status:  Inpatient    1960 MRN [id number]   Location [facility] 5SW/SE Attending Grzegorz Rubio MD   Hosp Day # 1 PCP Alyx Olivo MD     Date of Admission:  2025  Date of Consult:  25   Reason for Consultation:   Patient presented with alcohol withdrawal, Grzegorz Manzano MD requested psychiatric consult for evaluation and advice.    Consult Duration     The patient seen for initial psychiatric consult evaluation.   Record reviewed, communication with attending, communication with RN and patient seen face to face evaluation.    History of Present Illness:   Patient is a 65 year old , , female with past medical history of htn, alcohol use disorder, hypothyroidism, CKD  who was admitted to the hospital for Hypercalcemia: The patient has been demonstrating symptoms of alcohol withdrawal/increased anxiety.   Patient indicated for psych consult for evaluation and advise.    Per chart review, the patient presented to the hospital with complaint of abdominal pain and near syncope episode    The patient received the following psychotropic medications: lexapro 10 mg, no use of ativan per CIWA protocol    Labs and imaging reviewed: TSH 0.633, mag 1.2, AST 23    Most recent CIWA 2  Vital signs /67 HR 80    The patient is well known to the psychiatry team from multiple previous consults. The patient was last seen  for alcohol withdrawal.     The patient seen today sitting in hospital bed. She presents calm, cooperative and pleasant.   She is not demonstrating any restlessness or agitation.    The patient is alert and oriented to person, place, date and condition. The patient answers questions and engages in appropriate conversation with no impairment in cognition or distortion in thought process.     She reports coming to the hospital due to dizziness and  abdominal pains.     The patient reporting that she drinks one bottle of wine daily for many years. She denies history of seizures or dts. She notes that her last drink was on Thursday. She states that she does not plan to stop drinking. She notes that it is a habit that she does not want to stop. She states that she enjoys drinking.     She denies any withdrawal symptoms today. She denies nausea, vomiting, abdominal pain, tremors, restlessness, vision changes.    Patient endorses that her mood is up and down. She demonstrate some low mood, hopelessness, helplessness, with increased anxiety, worry, ruminations with impairment in sleep.     Patient repeatedly denies any suicidal ideations.    The patient is not demonstrating any brittanie or psychosis  The patient denies auditory or visual hallucinations  The patient denies suicidal or homicidal ideation.    The patient has not been demonstrating any symptoms of withdrawal. She is not motivated to stop drinking at this time. Resources will be provided in discharge instructions. The patient demonstrates some low mood, hopelessness, helplessness, with increased anxiety, worry, ruminations with impairment in sleep. She denies si/hi. She is not demonstrating any safety concerns. She is agreeable with medications changes and follow up with outpatient psychiatry providers.    Past Psychiatric/Medication History:  1. Prior diagnoses: Alcohol dependence 2016, depression 2016  2. Patient was seen by me in consult service in 2016 and 2024.   3. No inpatient admission. Reports going to rehab multiple times years ago. Last time she was in rehab was 2009   4. Past suicide history: reports history of overdose in 2009   5. Medication history: lexpapro      Social History:   Lives at home with her . No children. Not currently working.  Drinks one bottle of win nightly. Denies smoking or illicit drugs.      Family History:  Patient reports that her father passed away in his 70s  due to a heart issue. She states he had an alcohol dependence.  Medical History:   Past Medical History  Past Medical History[1]    Past Surgical History  Past Surgical History[2]    Family History  Family History[3]    Social History  Short Social Hx on File[4]        Current Medications:  Current Hospital Medications[5]  Prescriptions Prior to Admission[6]    Allergies  Allergies[7]    Review of Systems:   As by Admitting/Attending    Results:   Laboratory Data:  Lab Results   Component Value Date    WBC 4.8 06/27/2025    HGB 9.9 (L) 06/27/2025    HCT 30.1 (L) 06/27/2025    .0 06/27/2025    CREATSERUM 1.16 (H) 06/27/2025    BUN 18 06/27/2025     06/27/2025    K 3.3 (L) 06/27/2025     06/27/2025    CO2 25.0 06/27/2025    GLU 87 06/27/2025    CA 9.9 06/27/2025    ALB 4.9 (H) 06/26/2025    ALKPHO 67 06/26/2025    TP 7.8 06/26/2025    AST 23 06/26/2025    ALT 12 06/26/2025    PTT 24.5 09/19/2018    INR 1.0 09/19/2018    PTP 12.7 09/19/2018    T4F 1.4 12/07/2024    TSH 0.633 06/27/2025    LIP 32 01/09/2024    DDIMER 2.72 (H) 03/01/2018    MG 1.2 (L) 06/27/2025    PHOS 3.6 03/09/2018    TROP 0.04 (HH) 03/01/2018    B12 439 12/07/2024    ETOH <3 01/09/2024         Imaging:  CT BRAIN OR HEAD (CPT=70450)  Result Date: 6/27/2025  CONCLUSION: 1.  8 mm diameter focus of scalp elevation/inflammation in the left frontal/forehead region, which could represent scar or acute/subacute injury. Correlate with clinical history and exam findings. Otherwise, no acute calvarial fracture. No acute intracranial process. 2.  There are mild microvascular white matter ischemic changes, likely related to long-standing hypertension and/or diabetes. 3. Calcific atherosclerosis in the anterior circulation. Electronically Verified and Signed by Attending Radiologist: Sree White MD 6/27/2025 1:43 PM Workstation: [id number]    CT ABDOMEN+PELVIS(CONTRAST ONLY)(CPT=74177)  Result Date: 6/26/2025  CONCLUSION: No CT evidence of acute  abnormality within the abdomen/pelvis. Incidental nonacute findings are present and are described within the body of the report. Electronically Verified and Signed by Attending Radiologist: Kurtis Menjivar MD 6/26/2025 7:48 PM Workstation: [id number]      Vital Signs:   Blood pressure 117/67, pulse 80, temperature 98.9 °F (37.2 °C), temperature source Oral, resp. rate 18, height 5' 10.98\" (1.803 m), weight 68 kg (150 lb), SpO2 98%, not currently breastfeeding.    Mental Status Exam:   Appearance: Stated age female, in hospital gown, laying down in hospital bed.  Psychomotor: No psychomotor agitation, or retardation. No tremors observed.  Orientation: Alert and oriented to person, place, time and condition.  Gait: Not evaluated.  Attitude/Coorperation: Cooperative and attentive.  Behavior: Appropriate.  Speech: Regular rate and rhythm speech.  Mood: Patient reporting depressed and anxious mood.  Affect: Congruent with the mood.  Thought process: Linear and appropriate.  Thought content: Patient denies any suicidal or homicidal ideation.  Perceptions: Patient denies any auditory or visual hallucinations.  Concentration: Grossly intact.  Memory: Grossly intact.  Intellect: Average.  Judgment and Insight: Questionable.     Impression:     Alcohol withdrawal syndrome uncomplicated.  Alcohol Dependence.  Episodic mood disorder    Hypercalcemia    Acute cystitis without hematuria    TIMMY (acute kidney injury)    Alcoholism (HCC)    Patient is a 65 year old , , female with past medical history of htn, alcohol use disorder, hypothyroidism, CKD  who was admitted to the hospital for Hypercalcemia: The patient has been demonstrating symptoms of alcohol withdrawal/increased anxiety.     The patient has not been demonstrating any symptoms of withdrawal. She is not motivated to stop drinking at this time. Resources will be provided in discharge instructions. The patient demonstrates some low mood, hopelessness,  helplessness, with increased anxiety, worry, ruminations with impairment in sleep. She denies si/hi. She is not demonstrating any safety concerns. She is agreeable with medications changes and follow up with outpatient psychiatry providers.      Discussed risk and benefit, acknowledging the current symptom and severity.  At this point, I would recommend the following approach:     Focus on safety. No safety concerns.  Focus on education and support.  Focus on insight orientation helping the patient understand diagnosis and treatment plan.  Start seroquel 25 mg nightly  Continue lexapro 5 mg   Continue CIWA   Processed with patient at length, the initiation of the above psychotropic medications I advised the patient of the risks, benefits, alternatives and potential side effects. The patient consents to administration of the medications and understands the right to refuse medications at any time. The patient verbalized understanding.   Coordinate plan with team. Resources provided in AVS    Orders This Visit:  Orders Placed This Encounter   Procedures    Urinalysis with Culture Reflex    CBC With Differential With Platelet    Comp Metabolic Panel (14)    Basic Metabolic Panel (8)    CBC With Differential With Platelet    Magnesium    Magnesium    Iron And Tibc    Ferritin    PTH, Intact    Vitamin D    Vitamin D, 25-Hydroxy    Potassium    Magnesium    TSH W Reflex To Free T4    Urine Culture, Routine    Occult Blood Stool, Diagnostic       Meds This Visit:  Requested Prescriptions      No prescriptions requested or ordered in this encounter       KARAN Durham  6/27/2025    Note to Patient: The 21st Century Cures Act makes medical notes like these available to patients in the interest of transparency. However, be advised this is a medical document. It is intended as peer to peer communication. It is written in medical language and may contain abbreviations or verbiage that are unfamiliar. It may appear blunt or  direct. Medical documents are intended to carry relevant information, facts as evident, and the clinical opinion of the practitioner. This note may have been transcribed using a voice dictation system. Voice recognition errors may occur. This should not be taken to alter the content or meaning of this note.           [1]   Past Medical History:   Broken wrist    per [employer]:  \"Management:  orif left wrist\"    Disorder of thyroid    Duodenal ulcer    GI bleed    High blood pressure    High cholesterol    Hypothyroidism    Osteoarthritis of left hip    Renal insufficiency    Unspecified essential hypertension    Visual impairment   [2]   Past Surgical History:  Procedure Laterality Date    Colon surgery  03/01/2018    Electrocardiogram, complete  05-    Scanned to Media Tab - Date of Service 05-    Fracture surgery      Hip replacement surgery Left 2010    left hip replacement    Hip replacement surgery Right 2013    right hip replacement    Other Right 09/28/15    ORIF patella    Total hip replacement      Wrist fracture surgery Left 2012   [3]   Family History  Problem Relation Age of Onset    Arthritis Mother     Heart Disease Father         Valve repair    Stroke Paternal Grandmother     Diabetes Brother    [4]   Social History  Socioeconomic History    Marital status:    Tobacco Use    Smoking status: Never    Smokeless tobacco: Never    Tobacco comments:     Never smoked   Vaping Use    Vaping status: Never Used   Substance and Sexual Activity    Alcohol use: Yes     Alcohol/week: 7.0 standard drinks of alcohol     Types: 7 Glasses of wine per week     Comment: few days a week    Drug use: Never   Other Topics Concern    Caffeine Concern Yes     Comment: Coffee, occasionally     Social Drivers of Health     Food Insecurity: No Food Insecurity (6/26/2025)    NCSS - Food Insecurity     Worried About Running Out of Food in the Last Year: No     Ran Out of Food in the Last Year: No    Transportation Needs: No Transportation Needs (6/26/2025)    NCSS - Transportation     Lack of Transportation: No   Housing Stability: Not At Risk (6/26/2025)    NCSS - Housing/Utilities     Has Housing: Yes     Worried About Losing Housing: No     Unable to Get Utilities: No   [5]   Current Facility-Administered Medications   Medication Dose Route Frequency    potassium chloride 40 mEq in 250mL sodium chloride 0.9% IVPB premix  40 mEq Intravenous Once    amLODIPine (Norvasc) tab 10 mg  10 mg Oral Daily    escitalopram (Lexapro) tab 10 mg  10 mg Oral Daily    levothyroxine (Synthroid) tab 112 mcg  112 mcg Oral Before breakfast    rosuvastatin (Crestor) tab 5 mg  5 mg Oral QAM    pantoprazole (Protonix) 40 mg in sodium chloride 0.9% PF 10 mL IV push  40 mg Intravenous Q12H    sodium chloride 0.9% infusion   Intravenous Continuous    heparin (Porcine) 5000 UNIT/ML injection 5,000 Units  5,000 Units Subcutaneous 2 times per day    acetaminophen (Tylenol Extra Strength) tab 500 mg  500 mg Oral Q4H PRN    ondansetron (Zofran) 4 MG/2ML injection 4 mg  4 mg Intravenous Q6H PRN    metoclopramide (Reglan) 5 mg/mL injection 5 mg  5 mg Intravenous Q8H PRN    LORazepam (Ativan) tab 1 mg  1 mg Oral Q1H PRN    Or    LORazepam (Ativan) tab 2 mg  2 mg Oral Q1H PRN    thiamine (Vitamin B1) tab 100 mg  100 mg Oral Daily    multivitamin (Tab-A-Bhanu/Beta Carotene) tab 1 tablet  1 tablet Oral Daily    folic acid (Folvite) tab 1 mg  1 mg Oral Daily    cefTRIAXone (Rocephin) 2 g in sodium chloride 0.9% 100mL IVPB-ASHWINI  2 g Intravenous Q24H   [6]   Medications Prior to Admission   Medication Sig    Ascorbic Acid (GARO-C OR) Take 1 tablet by mouth daily.    Cholecalciferol (VITAMIN D-3 OR) Take 1 tablet by mouth daily.    Thiamine HCl (VITAMIN B-1 OR) Take 1 tablet by mouth daily.    IRON OR Take 1 tablet by mouth daily.    amLODIPine 10 MG Oral Tab Take 1 tablet (10 mg total) by mouth daily.    escitalopram (LEXAPRO) 10 MG Oral Tab Take 1  tablet (10 mg total) by mouth daily.    levothyroxine 112 MCG Oral Tab Take 1 tablet (112 mcg total) by mouth before breakfast.    rosuvastatin 5 MG Oral Tab Take 1 tablet (5 mg total) by mouth nightly. (Patient taking differently: Take 1 tablet (5 mg total) by mouth every morning.)    Losartan Potassium-HCTZ 100-12.5 MG Oral Tab Take 1 tablet by mouth daily.   [7] No Known Allergies

## 2025-06-27 NOTE — DISCHARGE INSTRUCTIONS
Behavioral Health:     You were seen by Psychiatry while in the hospital. At this time, it is recommended that you continue your care with outpatient mental health services. Below are referrals for psychiatry and counseling. Please contact the providers listed to schedule your initial appointment and ensure continuity of care.      [facility] ([facility]): multiple locations for both therapy and psychiatry Phone: [phone]  [facility]- therapy/psychiatry: [address] [place] [address] Phone: [phone]  [facility] -therapy only: [address] [place] [address] Phone: [phone]  [facility] - therapy only: [address] [place] [address] Phone: [phone]  [facility]-therapy/psychiatry: [address] [place] [address] Phone: [phone]    [facility] Aftercare Dept  [email or web address]   [address] [place], [place] [id number] [phone]    [facility]  [email or web address]   [address] [place], [place] [id number] [phone]     If you are experiencing a mental health crisis, please call 911 or go to your nearest ED. If you are not in immediate danger but require emotional support and/or assistance, please contact: [facility] at [phone] or [facility] at [phone].

## 2025-06-27 NOTE — H&P (VIEW-ONLY)
Is this a shared or split note between Advanced Practice Provider and Physician? Yes      [facility]   Gastroenterology Consultation Note    Marissa Delaney  Patient Status:    Inpatient  Date of Admission:         6/26/2025, Hospital day #1  Attending:   Grzegorz Rubio MD  PCP:     Alyx Olivo MD    Reason for Consultation:  Anemia, Dark Stools, hx PUD    History of Present Illness:  Marissa Delaney is a 65 year old female w/ PMHx of BMI 20.93, HTN, HLD, Hypothyroid, Renal insufficiency, Chronic back pain on NSAIDs), complex ulcer disease including history of duodenal and gastric ulcer bleeding, pyloric stenosis status post dilation, status post pyloroplasty, status post spontaneous perforation of gastric ulcer with surgical repair with most recent EGD 1/2024 revealing gastric and duodenal bulb ulcer who presented to the ED with lightheadedness and presyncopal episode.    Pt states that she has intermittent lightheadedness/dizziness along with chronic dyspepsia and nausea without vomiting.  She notes consuming about 1-2 bottles of wine daily with last drink two days ago.  She had been taking pepto-bismol daily for the past few weeks given stomach upset.  Stools were dark but formed.  She denies ABD pain, difficult/painful swallowing, bloody/maroon stools, diarrhea, constipation, fever, chills, chest pain, SOB.  She reports unintentional weight loss of ~15 pounds in the past 3 weeks.  No AC or NSAIDs.  She knows she cannot take ASA given her hx of ulcers.    Pertinent Family Hx:  - No known history of esophageal, gastric or colon cancers  - No known IBD  - No known liver pathologies    Endoscopy Hx:  - EGD 1/2024 with Dr. Mary for epigastric pain:    Impression:   1. Large 3 x 2 cm deep ulcer along greater curvature of stomach, non-bleeding  2. Clean based 15 mm duodenal bulb ulcer  -EGD 9/2018 with Dr. Avalos:   IMPRESSION:    1.       Actively bleeding duodenal bulb ulcer with visible vessel.  2.        Surgical changes antrum of stomach status post cautery, injection, clipping, and status post TAHMINA test.  The patient tolerated the procedure well without immediate complication.     Social Hx:  - No tobacco use  - + ETOH, 1-2 bottles of wine daily  - Denies cannabis/illicit drug use  - Denies NSAIDs  - Lives with , feels safe  - Occupation: Retired     History:  Past Medical History[1]  Past Surgical History[2]  Family History[3]   reports that she has never smoked. She has never used smokeless tobacco. She reports current alcohol use of about 7.0 standard drinks of alcohol per week. She reports that she does not use drugs.    Allergies:  Allergies[4]    Medications:  Current Hospital Medications[5]    Review of Systems:   CONSTITUTIONAL:  negative for fevers, chills, + unintentional weight loss   EYES:  negative for diplopia or change in vision   RESPIRATORY:  negative for severe shortness of breath  CARDIOVASCULAR:  negative for crushing sub-sternal chest pain  GASTROINTESTINAL:  see HPI  GENITOURINARY:  negative for dysuria or gross hematuria  INTEGUMENT/BREAST:  SKIN:  negative for jaundice or new rash   ALLERGIC/IMMUNOLOGIC:  negative for hay fever   ENDOCRINE:  negative for cold intolerance and heat intolerance  MUSCULOSKELETAL:  negative for joint effusion/severe erythema  NEURO: negative for new loss of consciousness or dizziness   BEHAVIOR/PSYCH:  negative for psychotic behavior    Physical Exam:    Blood pressure 125/66, pulse 80, temperature 98.9 °F (37.2 °C), temperature source Oral, resp. rate 18, height 5' 10.98\" (1.803 m), weight 150 lb (68 kg), SpO2 95%, not currently breastfeeding. Body mass index is 20.93 kg/m².    Gen: awake, alert patient, NAD  HEENT: EOMI, the sclera appears anicteric, oropharynx clear, mucus membranes appear moist  CV: RRR  Lung: no conversational dyspnea   Abdomen: soft NTND abdomen with NABS appreciated   Back: No CVA tenderness   Skin: dry, warm, no jaundice  Ext: no  LE edema is evident  Neuro: Alert, oriented x4 and interactive  Psych: calm, cooperative    Wt Readings from Last 6 Encounters:   06/26/25 150 lb (68 kg)   12/07/24 165 lb 6.4 oz (75 kg)   01/31/24 173 lb (78.5 kg)   01/17/24 175 lb 9.6 oz (79.7 kg)   01/10/24 178 lb (80.7 kg)   10/02/23 180 lb 6.4 oz (81.8 kg)      Laboratory Data:  Lab Results   Component Value Date    WBC 4.8 06/27/2025    HGB 9.9 06/27/2025    HCT 30.1 06/27/2025    .0 06/27/2025    CREATSERUM 1.16 06/27/2025    BUN 18 06/27/2025     06/27/2025    K 3.3 06/27/2025     06/27/2025    CO2 25.0 06/27/2025    GLU 87 06/27/2025    CA 9.9 06/27/2025    ALB 4.9 06/26/2025    ALKPHO 67 06/26/2025    BILT 0.3 06/26/2025    TP 7.8 06/26/2025    AST 23 06/26/2025    ALT 12 06/26/2025    MG 1.2 06/27/2025       Imaging:  CT ABDOMEN+PELVIS(CONTRAST ONLY)(CPT=74177)  Result Date: 6/26/2025  CONCLUSION: No CT evidence of acute abnormality within the abdomen/pelvis. Incidental nonacute findings are present and are described within the body of the report. Electronically Verified and Signed by Attending Radiologist: Kurtis Menjivar MD 6/26/2025 7:48 PM Workstation: [id number]      Assessment & Plan   Marissa Delaney is a 65 year old female w/ PMHx of BMI 20.93, HTN, HLD, Hypothyroid, Renal insufficiency, Chronic back pain on NSAIDs), complex ulcer disease including history of duodenal and gastric ulcer bleeding, pyloric stenosis status post dilation, status post pyloroplasty, status post spontaneous perforation of gastric ulcer with surgical repair with most recent EGD 1/2024 revealing gastric and duodenal bulb ulcer who presented to the ED with lightheadedness and presyncopal episode.    #Anemia  #Hx of PUD  -Labs on admission with hypercalcemia, elevated BUN, normal LFTs, Hgb 11.5 down to 9.9 this am, baseline 12-13  -CT A/P on admission with no CT evidence of acute abnormality, noted embolization coils distal gastric antrum  -No overt GIB,  HDS  -Last EGD 1/2024:   Impression:   1. Large 3 x 2 cm deep ulcer along greater curvature of stomach, non-bleeding  2. Clean based 15 mm duodenal bulb ulcer  -Recommendation at that time was to avoid NSAIDs and ETOH as well as complete surveillance EGD in 6-12 weeks though Pt was lost to follow up  -Etiology unclear given peptobismol use but possible recurrence of PUD, gastritis, esophagitis, AVM v less likely neoplasm  -Recommendation EGD for further evaluation especially given lost to follow after last EGD.  Risks/benefits of procedure discussed.  Pt states understanding and agrees to proceed.    EGD consent: I have discussed the risks, benefits, and alternatives to upper endoscopy/enteroscopy with the patient/primary decision maker [who demonstrated understanding], including but not limited to the risks of bleeding, infection, pain, death, as well as the risks of anesthesia and perforation all leading to prolonged hospitalization, surgical intervention, or even death. I also specifically mentioned the miss rate of upper endoscopy of 5-10% in the best of all circumstances.  The patient has agreed to sign an informed consent and elected to proceed with procedure with possible intervention [i.e. polypectomy, stent placement, etc.] as indicated.     Recommend:  -CLD  -EGD tomorrow  -NPO at 0000  -Empiric PPI BID  -Monitor for overt bleeding  -Trend Hgb  -ETOH cessation, consider etoh counselor while admitted    Thank you for the opportunity to participate in the care of this patient.    Case discussed with Beltran Mauro MD and Suzette HAIRSTON.    Jaye Guillory DNP, FNP-[facility]  6/27/2025          [1]   Past Medical History:   Broken wrist    per [employer]:  \"Management:  orif left wrist\"    Disorder of thyroid    Duodenal ulcer    GI bleed    High blood pressure    High cholesterol    Hypothyroidism    Osteoarthritis of left hip    Renal insufficiency    Unspecified essential hypertension    Visual  impairment   [2]   Past Surgical History:  Procedure Laterality Date    Colon surgery  03/01/2018    Electrocardiogram, complete  05-    Scanned to Media Tab - Date of Service 05-    Fracture surgery      Hip replacement surgery Left 2010    left hip replacement    Hip replacement surgery Right 2013    right hip replacement    Other Right 09/28/15    ORIF patella    Total hip replacement      Wrist fracture surgery Left 2012   [3]   Family History  Problem Relation Age of Onset    Arthritis Mother     Heart Disease Father         Valve repair    Stroke Paternal Grandmother     Diabetes Brother    [4] No Known Allergies  [5]   Current Facility-Administered Medications:     sodium chloride 0.9% infusion, , Intravenous, Continuous    heparin (Porcine) 5000 UNIT/ML injection 5,000 Units, 5,000 Units, Subcutaneous, 2 times per day    acetaminophen (Tylenol Extra Strength) tab 500 mg, 500 mg, Oral, Q4H PRN    ondansetron (Zofran) 4 MG/2ML injection 4 mg, 4 mg, Intravenous, Q6H PRN    metoclopramide (Reglan) 5 mg/mL injection 5 mg, 5 mg, Intravenous, Q8H PRN    pantoprazole (Protonix) 40 mg in sodium chloride 0.9% PF 10 mL IV push, 40 mg, Intravenous, Daily    LORazepam (Ativan) tab 1 mg, 1 mg, Oral, Q1H PRN **OR** LORazepam (Ativan) tab 2 mg, 2 mg, Oral, Q1H PRN    thiamine (Vitamin B1) tab 100 mg, 100 mg, Oral, Daily    multivitamin (Tab-A-Bhanu/Beta Carotene) tab 1 tablet, 1 tablet, Oral, Daily    folic acid (Folvite) tab 1 mg, 1 mg, Oral, Daily    cefTRIAXone (Rocephin) 2 g in sodium chloride 0.9% 100mL IVPB-ASHWINI, 2 g, Intravenous, Q24H

## 2025-06-27 NOTE — PLAN OF CARE
Problem: Patient Centered Care  Goal: Patient preferences are identified and integrated in the patient's plan of care  Description: Interventions:  - Provide timely, complete, and accurate information to patient/family  - Incorporate patient and family knowledge, values, beliefs, and cultural backgrounds into the planning and delivery of care  - Encourage patient/family to participate in care and decision-making at the level they choose  - Honor patient and family perspectives and choices  Outcome: Progressing

## 2025-06-27 NOTE — PLAN OF CARE
Problem: GASTROINTESTINAL - ADULT  Goal: Minimal or absence of nausea and vomiting  Description: INTERVENTIONS:  - Maintain adequate hydration with IV or PO as ordered and tolerated  - Nasogastric tube to low intermittent suction as ordered  - Evaluate effectiveness of ordered antiemetic medications  - Provide nonpharmacologic comfort measures as appropriate  - Advance diet as tolerated, if ordered  - Obtain nutritional consult as needed  - Evaluate fluid balance  Outcome: Progressing  Goal: Maintains adequate nutritional intake (undernourished)  Description: INTERVENTIONS:  - Monitor percentage of each meal consumed  - Identify factors contributing to decreased intake, treat as appropriate  - Assist with meals as needed  - Monitor I&O, WT and lab values  - Obtain nutritional consult as needed  - Optimize oral hygiene and moisture  - Encourage food from home; allow for food preferences  - Enhance eating environment  Outcome: Progressing     Problem: METABOLIC/FLUID AND ELECTROLYTES - ADULT  Goal: Electrolytes maintained within normal limits  Description: INTERVENTIONS:  - Monitor labs and rhythm and assess patient for signs and symptoms of electrolyte imbalances  - Administer electrolyte replacement as ordered  - Monitor response to electrolyte replacements, including rhythm and repeat lab results as appropriate  - Fluid restriction as ordered  - Instruct patient on fluid and nutrition restrictions as appropriate  Outcome: Progressing     Problem: DRUG ABUSE/DETOX  Goal: Will have no detox symptoms and will verbalize plan for changing drug-related behavior  Description: INTERVENTIONS:  - Administer medication as ordered  - Monitor physical status  - Provide emotional support with 1:1 interaction with staff  - Encourage 12-Step involvement or recovery focused alternative  Outcome: Progressing     Problem: ANXIETY  Goal: Will report anxiety at manageable levels  Description: INTERVENTIONS:  - Administer medication  as ordered  - Teach and rehearse alternative coping skills  - Provide emotional support with 1:1 interaction with staff  Outcome: Not Progressing

## 2025-06-27 NOTE — DIETARY NOTE
ADULT NUTRITION INITIAL ASSESSMENT    Pt is at high nutrition risk.  Pt meets severe malnutrition criteria.      RECOMMENDATIONS TO MD: See nutrition intervention for ONS (oral nutrition supplements)    ADMITTING DIAGNOSIS:  Hypercalcemia [E83.52]  Alcoholism (HCC) [F10.20]  TIMMY (acute kidney injury) [N17.9]  Acute cystitis without hematuria [N30.00]  PERTINENT PAST MEDICAL HISTORY: Past Medical History[1]    PATIENT STATUS: Initial 06/27/25: Pt assessed due to screening at risk for weight loss d/t poor appetite. 66 y/o female with PMH CKD stage 3, ETOH use presents with lightheadedness and presyncopal episode. Per chart review, pt consumed a bottle of wine on a daily basis and does not have much of an appetite. Pt visited, reported weight loss d/t poor appetite over the past 3 weeks. Pt reported UBW ~165 lbs. Pt currently weighs 150 lbs (-15 lbs, 9%, x 3 weeks, significant per guidelines). Noted pt seen by GI today for anemia, plan for EGD tomorrow. Pt currently on clear liquid diet, NPO at midnight. Pt reported tolerating clear liquid diet, no n/v since admit. Feeling better at time of visit. Had broth for breakfast this AM. Offered to add ONS to order to help meet nutritional needs when intake is low and on CLD, pt agreeable to try. Will order for her BID. Answered all questions at this time. Will monitor for diet adv, intakes, and follow per protocol.    FOOD/NUTRITION RELATED HISTORY:  Appetite: poor appetite PTA  Intake: ~100% x1 meals documented since admit  Intake Meeting Needs: No, but oral nutrition supplements (ONS) to maximize  Percent Meals Eaten (last 6 days)       Date/Time Percent Meals Eaten (%)    06/26/25 2027 0 %     Percent Meals Eaten (%): pt refused dinner at 06/26/25 2027 06/27/25 0821 100 %           Food Allergies: No Known Food Allergies (NKFA)  Cultural/Ethnic/Scientology Preferences: None    GASTROINTESTINAL: abdominal pain, Loss of appetite, nausea, and vomiting    MEDICATIONS:  reviewed  Scheduled Medications[2]  LABS: K+ 3.3 and Magnesium 1.2 replaced per protocol  Recent Labs     06/26/25  1221 06/27/25  0630   GLU 91 87   BUN 27* 18   CREATSERUM 1.58* 1.16*   CA 13.1* 9.9   MG 1.8 1.2*   * 140   K 4.3 3.3*   CL 99 106   CO2 25.0 25.0   OSMOCALC 285 291       WEIGHT HISTORY:  Patient Weight(s) for the past 336 hrs:   Weight   06/26/25 2006 68 kg (150 lb)   06/26/25 1125 68 kg (150 lb)     Wt Readings from Last 10 Encounters:   06/26/25 68 kg (150 lb)   12/07/24 75 kg (165 lb 6.4 oz)   01/31/24 78.5 kg (173 lb)   01/17/24 79.7 kg (175 lb 9.6 oz)   01/10/24 80.7 kg (178 lb)   10/02/23 81.8 kg (180 lb 6.4 oz)   12/21/22 77.1 kg (170 lb)   12/21/22 77.1 kg (170 lb)   09/28/22 85.3 kg (188 lb)   08/12/21 79.7 kg (175 lb 9.6 oz)       ANTHROPOMETRICS:  HT: 180.3 cm (5' 10.98\")  Wt Readings from Last 1 Encounters:   06/26/25 68 kg (150 lb)     Last weight: Likely accurate  BMI: Body mass index is 20.93 kg/m².  Dosing Weight: 68 kg - admission weight, utilized for anthropometric calculations  BMI CLASSIFICATION: 18.5-24.9 kg/m2 - WNL  IBW/lbs (Calculated) Female: 154.92 lbs             97% IBW  Usual Body Wt: 165 lbs       91% UBW    NUTRITION RELATED PHYSICAL FINDINGS:  - Nutrition Focused Physical Exam (NFPE): mild depletion body fat orbital region and mild depletion muscle mass temple region and clavicle region  - Fluid Accumulation: none . See RN documentation for details  - Skin Integrity: intact. See RN documentation for details    CRITERIA FOR MALNUTRITION DIAGNOSIS:  Criteria for severe malnutrition diagnosis: acute illness/injury related to wt loss greater than 5% in 1 month and energy intake less than 50% for greater than 5 days.    NUTRITION DIAGNOSIS/PROBLEM:   Severe Malnutrition related to Acute - Physiological causes resulting in anorexia or diminished intake  as evidenced by wt loss greater than 5% in 1 month and energy intake less than 50% for greater than 5 days, mild  muscle mass depletion, mild body fat depletion.    NUTRITION INTERVENTION:     NUTRITION PRESCRIPTION:   Estimated Nutrition needs: --dosing wt of 68 kg - wt taken on 6/26  Calories: 1700 - 2040 calories/day (25-30 calories per kg Dosing wt)  Protein: 54 - 88 g protein/day (0.8-1.3 g protein/kg Dosing wt)  Fluid Needs: 1 mL/kcal    - Diet:       Procedures    Clear liquid diet Is Patient on Accuchecks? No    NPO      - Nutrition Care Plan: Encouraged increased PO intake, Encouraged small frequent meals with emphasis on high calorie/high protein, and Initiated ONS (oral nutritional supplements)  - ONS (Oral Nutrition Supplements)/Meals/Snacks: Ensure Clear (240 calories/ 8 g protein each) BID Mixed Berry   - Vitamin and mineral supplements: folic acid, multivitamin/mineral, and thiamin per MD order  - Feeding assistance: independent  - Nutrition education: Discussed importance of adequate energy and protein intake    - Coordination of nutrition care: collaboration with other providers  - Discharge and transfer of nutrition care to new setting or provider: monitor plans    MONITOR AND EVALUATE/NUTRITION GOALS:  - Food and Nutrient Intake:      Monitor: adequacy of supplement intake and for PO diet advancement  - Food and Nutrient Administration:      Monitor: N/A  - Anthropometric Measurement:    Monitor weight  - Nutrition Goals:      halt wt loss, regain wt as able, good supplement intake, diet beyond clears in 48hrs, and return to normal GI function    DIETITIAN FOLLOW UP: RD to follow and monitor nutrition status      Dunia Paige, MS, RD, LDN  Clinical Dietitian  [id number]         [1]   Past Medical History:   Broken wrist    per [employer]:  \"Management:  orif left wrist\"    Disorder of thyroid    Duodenal ulcer    GI bleed    High blood pressure    High cholesterol    Hypothyroidism    Osteoarthritis of left hip    Renal insufficiency    Unspecified essential hypertension    Visual impairment   [2]    potassium  chloride  40 mEq Intravenous Once    magnesium sulfate  4 g Intravenous Once    amLODIPine  10 mg Oral Daily    escitalopram  10 mg Oral Daily    levothyroxine  112 mcg Oral Before breakfast    rosuvastatin  5 mg Oral QAM    pantoprazole  40 mg Intravenous Q12H    heparin  5,000 Units Subcutaneous 2 times per day    thiamine  100 mg Oral Daily    multivitamin  1 tablet Oral Daily    folic acid  1 mg Oral Daily    cefTRIAXone  2 g Intravenous Q24H

## 2025-06-27 NOTE — CONSULTS
Is this a shared or split note between Advanced Practice Provider and Physician? Yes      [facility]   Gastroenterology Consultation Note    Marissa Delaney  Patient Status:    Inpatient  Date of Admission:         6/26/2025, Hospital day #1  Attending:   Grzegorz Rubio MD  PCP:     Alyx Olivo MD    Reason for Consultation:  Anemia, Dark Stools, hx PUD    History of Present Illness:  Marissa Delaney is a 65 year old female w/ PMHx of BMI 20.93, HTN, HLD, Hypothyroid, Renal insufficiency, Chronic back pain on NSAIDs), complex ulcer disease including history of duodenal and gastric ulcer bleeding, pyloric stenosis status post dilation, status post pyloroplasty, status post spontaneous perforation of gastric ulcer with surgical repair with most recent EGD 1/2024 revealing gastric and duodenal bulb ulcer who presented to the ED with lightheadedness and presyncopal episode.    Pt states that she has intermittent lightheadedness/dizziness along with chronic dyspepsia and nausea without vomiting.  She notes consuming about 1-2 bottles of wine daily with last drink two days ago.  She had been taking pepto-bismol daily for the past few weeks given stomach upset.  Stools were dark but formed.  She denies ABD pain, difficult/painful swallowing, bloody/maroon stools, diarrhea, constipation, fever, chills, chest pain, SOB.  She reports unintentional weight loss of ~15 pounds in the past 3 weeks.  No AC or NSAIDs.  She knows she cannot take ASA given her hx of ulcers.    Pertinent Family Hx:  - No known history of esophageal, gastric or colon cancers  - No known IBD  - No known liver pathologies    Endoscopy Hx:  - EGD 1/2024 with Dr. Mary for epigastric pain:    Impression:   1. Large 3 x 2 cm deep ulcer along greater curvature of stomach, non-bleeding  2. Clean based 15 mm duodenal bulb ulcer  -EGD 9/2018 with Dr. Avalos:   IMPRESSION:    1.       Actively bleeding duodenal bulb ulcer with visible vessel.  2.        Surgical changes antrum of stomach status post cautery, injection, clipping, and status post TAHMINA test.  The patient tolerated the procedure well without immediate complication.     Social Hx:  - No tobacco use  - + ETOH, 1-2 bottles of wine daily  - Denies cannabis/illicit drug use  - Denies NSAIDs  - Lives with , feels safe  - Occupation: Retired     History:  Past Medical History[1]  Past Surgical History[2]  Family History[3]   reports that she has never smoked. She has never used smokeless tobacco. She reports current alcohol use of about 7.0 standard drinks of alcohol per week. She reports that she does not use drugs.    Allergies:  Allergies[4]    Medications:  Current Hospital Medications[5]    Review of Systems:   CONSTITUTIONAL:  negative for fevers, chills, + unintentional weight loss   EYES:  negative for diplopia or change in vision   RESPIRATORY:  negative for severe shortness of breath  CARDIOVASCULAR:  negative for crushing sub-sternal chest pain  GASTROINTESTINAL:  see HPI  GENITOURINARY:  negative for dysuria or gross hematuria  INTEGUMENT/BREAST:  SKIN:  negative for jaundice or new rash   ALLERGIC/IMMUNOLOGIC:  negative for hay fever   ENDOCRINE:  negative for cold intolerance and heat intolerance  MUSCULOSKELETAL:  negative for joint effusion/severe erythema  NEURO: negative for new loss of consciousness or dizziness   BEHAVIOR/PSYCH:  negative for psychotic behavior    Physical Exam:    Blood pressure 125/66, pulse 80, temperature 98.9 °F (37.2 °C), temperature source Oral, resp. rate 18, height 5' 10.98\" (1.803 m), weight 150 lb (68 kg), SpO2 95%, not currently breastfeeding. Body mass index is 20.93 kg/m².    Gen: awake, alert patient, NAD  HEENT: EOMI, the sclera appears anicteric, oropharynx clear, mucus membranes appear moist  CV: RRR  Lung: no conversational dyspnea   Abdomen: soft NTND abdomen with NABS appreciated   Back: No CVA tenderness   Skin: dry, warm, no jaundice  Ext: no  LE edema is evident  Neuro: Alert, oriented x4 and interactive  Psych: calm, cooperative    Wt Readings from Last 6 Encounters:   06/26/25 150 lb (68 kg)   12/07/24 165 lb 6.4 oz (75 kg)   01/31/24 173 lb (78.5 kg)   01/17/24 175 lb 9.6 oz (79.7 kg)   01/10/24 178 lb (80.7 kg)   10/02/23 180 lb 6.4 oz (81.8 kg)      Laboratory Data:  Lab Results   Component Value Date    WBC 4.8 06/27/2025    HGB 9.9 06/27/2025    HCT 30.1 06/27/2025    .0 06/27/2025    CREATSERUM 1.16 06/27/2025    BUN 18 06/27/2025     06/27/2025    K 3.3 06/27/2025     06/27/2025    CO2 25.0 06/27/2025    GLU 87 06/27/2025    CA 9.9 06/27/2025    ALB 4.9 06/26/2025    ALKPHO 67 06/26/2025    BILT 0.3 06/26/2025    TP 7.8 06/26/2025    AST 23 06/26/2025    ALT 12 06/26/2025    MG 1.2 06/27/2025       Imaging:  CT ABDOMEN+PELVIS(CONTRAST ONLY)(CPT=74177)  Result Date: 6/26/2025  CONCLUSION: No CT evidence of acute abnormality within the abdomen/pelvis. Incidental nonacute findings are present and are described within the body of the report. Electronically Verified and Signed by Attending Radiologist: Kurtis Menjivar MD 6/26/2025 7:48 PM Workstation: [id number]      Assessment & Plan   Marissa Delaney is a 65 year old female w/ PMHx of BMI 20.93, HTN, HLD, Hypothyroid, Renal insufficiency, Chronic back pain on NSAIDs), complex ulcer disease including history of duodenal and gastric ulcer bleeding, pyloric stenosis status post dilation, status post pyloroplasty, status post spontaneous perforation of gastric ulcer with surgical repair with most recent EGD 1/2024 revealing gastric and duodenal bulb ulcer who presented to the ED with lightheadedness and presyncopal episode.    #Anemia  #Hx of PUD  -Labs on admission with hypercalcemia, elevated BUN, normal LFTs, Hgb 11.5 down to 9.9 this am, baseline 12-13  -CT A/P on admission with no CT evidence of acute abnormality, noted embolization coils distal gastric antrum  -No overt GIB,  HDS  -Last EGD 1/2024:   Impression:   1. Large 3 x 2 cm deep ulcer along greater curvature of stomach, non-bleeding  2. Clean based 15 mm duodenal bulb ulcer  -Recommendation at that time was to avoid NSAIDs and ETOH as well as complete surveillance EGD in 6-12 weeks though Pt was lost to follow up  -Etiology unclear given peptobismol use but possible recurrence of PUD, gastritis, esophagitis, AVM v less likely neoplasm  -Recommendation EGD for further evaluation especially given lost to follow after last EGD.  Risks/benefits of procedure discussed.  Pt states understanding and agrees to proceed.    EGD consent: I have discussed the risks, benefits, and alternatives to upper endoscopy/enteroscopy with the patient/primary decision maker [who demonstrated understanding], including but not limited to the risks of bleeding, infection, pain, death, as well as the risks of anesthesia and perforation all leading to prolonged hospitalization, surgical intervention, or even death. I also specifically mentioned the miss rate of upper endoscopy of 5-10% in the best of all circumstances.  The patient has agreed to sign an informed consent and elected to proceed with procedure with possible intervention [i.e. polypectomy, stent placement, etc.] as indicated.     Recommend:  -CLD  -EGD tomorrow  -NPO at 0000  -Empiric PPI BID  -Monitor for overt bleeding  -Trend Hgb  -ETOH cessation, consider etoh counselor while admitted    Thank you for the opportunity to participate in the care of this patient.    Case discussed with Beltran Mauro MD and Suzette HAIRSTON.    Jaye Guillory DNP, FNP-[facility]  6/27/2025          [1]   Past Medical History:   Broken wrist    per Next[employer]:  \"Management:  orif left wrist\"    Disorder of thyroid    Duodenal ulcer    GI bleed    High blood pressure    High cholesterol    Hypothyroidism    Osteoarthritis of left hip    Renal insufficiency    Unspecified essential hypertension    Visual  impairment   [2]   Past Surgical History:  Procedure Laterality Date    Colon surgery  03/01/2018    Electrocardiogram, complete  05-    Scanned to Media Tab - Date of Service 05-    Fracture surgery      Hip replacement surgery Left 2010    left hip replacement    Hip replacement surgery Right 2013    right hip replacement    Other Right 09/28/15    ORIF patella    Total hip replacement      Wrist fracture surgery Left 2012   [3]   Family History  Problem Relation Age of Onset    Arthritis Mother     Heart Disease Father         Valve repair    Stroke Paternal Grandmother     Diabetes Brother    [4] No Known Allergies  [5]   Current Facility-Administered Medications:     sodium chloride 0.9% infusion, , Intravenous, Continuous    heparin (Porcine) 5000 UNIT/ML injection 5,000 Units, 5,000 Units, Subcutaneous, 2 times per day    acetaminophen (Tylenol Extra Strength) tab 500 mg, 500 mg, Oral, Q4H PRN    ondansetron (Zofran) 4 MG/2ML injection 4 mg, 4 mg, Intravenous, Q6H PRN    metoclopramide (Reglan) 5 mg/mL injection 5 mg, 5 mg, Intravenous, Q8H PRN    pantoprazole (Protonix) 40 mg in sodium chloride 0.9% PF 10 mL IV push, 40 mg, Intravenous, Daily    LORazepam (Ativan) tab 1 mg, 1 mg, Oral, Q1H PRN **OR** LORazepam (Ativan) tab 2 mg, 2 mg, Oral, Q1H PRN    thiamine (Vitamin B1) tab 100 mg, 100 mg, Oral, Daily    multivitamin (Tab-A-Bhanu/Beta Carotene) tab 1 tablet, 1 tablet, Oral, Daily    folic acid (Folvite) tab 1 mg, 1 mg, Oral, Daily    cefTRIAXone (Rocephin) 2 g in sodium chloride 0.9% 100mL IVPB-ASHWINI, 2 g, Intravenous, Q24H

## 2025-06-28 ENCOUNTER — TELEPHONE (OUTPATIENT)
Facility: CLINIC | Age: 65
End: 2025-06-28

## 2025-06-28 ENCOUNTER — ANESTHESIA EVENT (OUTPATIENT)
Dept: ENDOSCOPY | Facility: HOSPITAL | Age: 65
End: 2025-06-28
Payer: MEDICARE

## 2025-06-28 ENCOUNTER — ANESTHESIA (OUTPATIENT)
Dept: ENDOSCOPY | Facility: HOSPITAL | Age: 65
End: 2025-06-28
Payer: MEDICARE

## 2025-06-28 VITALS
HEART RATE: 73 BPM | WEIGHT: 150 LBS | TEMPERATURE: 98 F | HEIGHT: 70.98 IN | SYSTOLIC BLOOD PRESSURE: 112 MMHG | BODY MASS INDEX: 21 KG/M2 | DIASTOLIC BLOOD PRESSURE: 68 MMHG | RESPIRATION RATE: 16 BRPM | OXYGEN SATURATION: 97 %

## 2025-06-28 PROBLEM — K31.9 GASTRIC ERYTHEMA: Status: ACTIVE | Noted: 2025-06-28

## 2025-06-28 PROBLEM — D64.9 ACUTE ON CHRONIC ANEMIA: Status: ACTIVE | Noted: 2024-01-09

## 2025-06-28 LAB
MAGNESIUM SERPL-MCNC: 1.7 MG/DL (ref 1.6–2.6)
POTASSIUM SERPL-SCNC: 3.4 MMOL/L (ref 3.5–5.1)

## 2025-06-28 PROCEDURE — 0DB98ZX EXCISION OF DUODENUM, VIA NATURAL OR ARTIFICIAL OPENING ENDOSCOPIC, DIAGNOSTIC: ICD-10-PCS | Performed by: INTERNAL MEDICINE

## 2025-06-28 PROCEDURE — 0DB68ZX EXCISION OF STOMACH, VIA NATURAL OR ARTIFICIAL OPENING ENDOSCOPIC, DIAGNOSTIC: ICD-10-PCS | Performed by: INTERNAL MEDICINE

## 2025-06-28 PROCEDURE — 99239 HOSP IP/OBS DSCHRG MGMT >30: CPT | Performed by: HOSPITALIST

## 2025-06-28 PROCEDURE — 43239 EGD BIOPSY SINGLE/MULTIPLE: CPT | Performed by: INTERNAL MEDICINE

## 2025-06-28 RX ORDER — FOLIC ACID 1 MG/1
1 TABLET ORAL DAILY
Qty: 30 TABLET | Refills: 0 | Status: SHIPPED | OUTPATIENT
Start: 2025-06-29

## 2025-06-28 RX ORDER — MAGNESIUM OXIDE 400 MG/1
400 TABLET ORAL ONCE
Status: COMPLETED | OUTPATIENT
Start: 2025-06-28 | End: 2025-06-28

## 2025-06-28 RX ORDER — POTASSIUM CHLORIDE 1500 MG/1
40 TABLET, EXTENDED RELEASE ORAL ONCE
Status: COMPLETED | OUTPATIENT
Start: 2025-06-28 | End: 2025-06-28

## 2025-06-28 RX ORDER — GRANULES FOR ORAL 3 G/1
3 POWDER ORAL
Qty: 6 G | Refills: 0 | Status: SHIPPED | OUTPATIENT
Start: 2025-06-28 | End: 2025-07-01

## 2025-06-28 RX ORDER — LIDOCAINE HYDROCHLORIDE 10 MG/ML
INJECTION, SOLUTION EPIDURAL; INFILTRATION; INTRACAUDAL; PERINEURAL AS NEEDED
Status: DISCONTINUED | OUTPATIENT
Start: 2025-06-28 | End: 2025-06-28 | Stop reason: SURG

## 2025-06-28 RX ORDER — MELATONIN
100 DAILY
Qty: 30 TABLET | Refills: 0 | Status: SHIPPED | OUTPATIENT
Start: 2025-06-29

## 2025-06-28 RX ORDER — PANTOPRAZOLE SODIUM 40 MG/1
40 TABLET, DELAYED RELEASE ORAL
Qty: 60 TABLET | Refills: 0 | Status: SHIPPED | OUTPATIENT
Start: 2025-06-28

## 2025-06-28 RX ADMIN — LIDOCAINE HYDROCHLORIDE 50 MG: 10 INJECTION, SOLUTION EPIDURAL; INFILTRATION; INTRACAUDAL; PERINEURAL at 09:02:00

## 2025-06-28 NOTE — OCCUPATIONAL THERAPY NOTE
OT orders received and chart reviewed. Pt refusing therapy; states she does not need services; at her functional baseline. MARIKA Glaser, aware. Please re-consult if there is a change in status. Will discontinue OT orders. Thank you.    Shelly Prather, Occupational Therapist, MS, OTR/L

## 2025-06-28 NOTE — DISCHARGE SUMMARY
[facility]  part of [facility]    Discharge Summary    Marissa Delaney Patient Status:  Inpatient    1960 MRN [id number]   Location [facility] 5SW/SE Attending Zhen Castellano MD   Hosp Day # 2 PCP Alyx Olivo MD     Date of Admission: 2025 Disposition: Home     Date of Discharge: 25    Admitting Diagnosis: Hypercalcemia [E83.52]  Alcoholism (HCC) [F10.20]  TIMMY (acute kidney injury) [N17.9]  Acute cystitis without hematuria [N30.00]    Hospital Discharge Diagnoses: TIMMY on CKD III, Hypercalcemia, ESBL E.coli UTI, ETOH abuse    Lace+ Score: 37  59-90 High Risk  29-58 Medium Risk  0-28   Low Risk.    TCM Follow-Up Recommendation:  LACE 29-58: Moderate Risk of readmission after discharge from the hospital.    Problem List: Problem List[1]    Reason for Admission: Presyncope, acute kidney injury, hypercalcemia.     Physical Exam:   Please see progress note from today    History of Present Illness:   Per Dr. Palm  Patient is a 65-year-old  female, came into the emergency department because of lightheadedness and presyncopal episode earlier today.  CBC showed no leukocytosis or left shift.  Hemoglobin 11.5 which is roughly around her baseline.  Chemistry today showed GFR of 36 which is below her baseline.  BUN 27 and creatinine 1.58.  Calcium 13.1.  CT scan of the abdomen and pelvis still pending.  EKG showed normal sinus rhythm.  No acute ST-T changes.       Hospital Course:   TIMMY on CKD III   Hypercalcemia   IMPROVED  Likely prerenal azotemia from dehydration   BUN/Creat improved with IVF   Baseline creat 1.2 per EMR   Calcium better likely elevated from tums/dehydration  Rec'd IVF  Ucx growing esbl e.coli  Hold losartan HTCZ  E coli UTI   ESBL+, ID consulted  Stable for dc on fosfomycin  Dizziness  Improved, refused PT/OT  ETOH abuse   Psych was consulted  Counseled on cessation  LLQ abd pain   Acute on chronic anemia   RESOLVED  GI on consult -> underwent  EGD this AM, please see report   CT A/P neg.   Cont PPI BID  H/o PUD   PPI BID     Other medical problems  Depression   Hyperlipidemia   Hypothyroidism   Vitamin D def.         Consultations: GI, ID    Procedures: see above    Complications: none    Discharge Condition: Stable    Discharge Medications:      Discharge Medications        START taking these medications        Instructions Prescription details   folic acid 1 MG Tabs  Commonly known as: Folvite      Take 1 tablet (1 mg total) by mouth daily.   Quantity: 30 tablet  Refills: 0     fosfomycin 3 g Pack  Commonly known as: Monurol      Take 3 g by mouth every other day for 2 doses.   Stop taking on: July 1, 2025  Quantity: 6 g  Refills: 0     pantoprazole 40 MG Tbec  Commonly known as: Protonix      Take 1 tablet (40 mg total) by mouth 2 (two) times daily before meals.   Quantity: 60 tablet  Refills: 0            CHANGE how you take these medications        Instructions Prescription details   rosuvastatin 5 MG Tabs  Commonly known as: Crestor  What changed: when to take this      Take 1 tablet (5 mg total) by mouth nightly.   Quantity: 90 tablet  Refills: 3     VITAMIN B-1 OR  What changed: Another medication with the same name was added. Make sure you understand how and when to take each.      Take 1 tablet by mouth daily.   Refills: 0     thiamine 100 MG Tabs  Commonly known as: Vitamin B1  What changed: You were already taking a medication with the same name, and this prescription was added. Make sure you understand how and when to take each.      Take 1 tablet (100 mg total) by mouth daily.   Quantity: 30 tablet  Refills: 0            CONTINUE taking these medications        Instructions Prescription details   amLODIPine 10 MG Tabs  Commonly known as: Norvasc      Take 1 tablet (10 mg total) by mouth daily.   Quantity: 90 tablet  Refills: 3     escitalopram 10 MG Tabs  Commonly known as: Lexapro      Take 1 tablet (10 mg total) by mouth daily.   Quantity:  90 tablet  Refills: 3     IRON OR      Take 1 tablet by mouth daily.   Refills: 0     levothyroxine 112 MCG Tabs  Commonly known as: Synthroid      Take 1 tablet (112 mcg total) by mouth before breakfast.   Quantity: 90 tablet  Refills: 3     GARO-C OR      Take 1 tablet by mouth daily.   Refills: 0     VITAMIN D-3 OR      Take 1 tablet by mouth daily.   Refills: 0            STOP taking these medications      Losartan Potassium-HCTZ 100-12.5 MG Tabs  Commonly known as: HYZAAR                  Where to Get Your Medications        These medications were sent to [id number] - [place], [place] - [address] [place] [address] AT [facility], [phone], [phone]  [address] [place] [id number]      Phone: [phone]   folic acid 1 MG Tabs  fosfomycin 3 g Pack  pantoprazole 40 MG Tbec  thiamine 100 MG Tabs       Greater than 35 minutes spent, >50% spent counseling re: treatment plan and workup     Zhen Castellano MD  6/28/2025           [1]   Patient Active Problem List  Diagnosis    Essential hypertension    Hypothyroidism    Hyperlipidemia    Duodenal erosion    History of GI bleed    Acute on chronic anemia    Alcohol dependence, continuous (HCC)    Episodic mood disorder    Hypercalcemia    Acute cystitis without hematuria    TIMMY (acute kidney injury)    Alcoholism ([facility])    Gastric erythema

## 2025-06-28 NOTE — ANESTHESIA PREPROCEDURE EVALUATION
Anesthesia PreOp Note    HPI:     Marissa Delaney is a 65 year old female who presents for preoperative consultation requested by: Beltran Mauro MD    Date of Surgery: 6/26/2025 - 6/28/2025    Procedure(s):  ESOPHAGOGASTRODUODENOSCOPY (EGD)  Indication: anemia, hx PUD    Relevant Problems   No relevant active problems       NPO:  Last Liquid Consumption Date: 06/27/25  Last Liquid Consumption Time: 2359  Last Solid Consumption Date: 06/25/25  Last Solid Consumption Time: 2359  Last Liquid Consumption Date: 06/27/25          History Review:  Patient Active Problem List    Diagnosis Date Noted    Hypercalcemia 06/26/2025    Acute cystitis without hematuria 06/26/2025    TIMMY (acute kidney injury) 06/26/2025    Alcoholism (HCC) 06/26/2025    Alcohol dependence, continuous (HCC) 01/10/2024    Episodic mood disorder 01/10/2024    History of GI bleed 08/12/2021    Hyperlipidemia 08/25/2015    Essential hypertension     Hypothyroidism        Past Medical History[1]    Past Surgical History[2]    Prescriptions Prior to Admission[3]  Current Medications and Prescriptions Ordered in Epic[4]    Allergies[5]    Family History[6]  Social Hx on file[7]    Available pre-op labs reviewed.  Lab Results   Component Value Date    WBC 4.8 06/27/2025    RBC 2.94 (L) 06/27/2025    HGB 9.9 (L) 06/27/2025    HCT 30.1 (L) 06/27/2025    .4 (H) 06/27/2025    MCH 33.7 06/27/2025    MCHC 32.9 06/27/2025    RDW 12.2 06/27/2025    .0 06/27/2025     Lab Results   Component Value Date     06/27/2025    K 3.4 (L) 06/28/2025     06/27/2025    CO2 25.0 06/27/2025    BUN 18 06/27/2025    CREATSERUM 1.16 (H) 06/27/2025    GLU 87 06/27/2025    CA 9.9 06/27/2025          Vital Signs:  Body mass index is 20.93 kg/m².   height is 1.803 m (5' 10.98\") and weight is 68 kg (150 lb). Her oral temperature is 98.7 °F (37.1 °C). Her blood pressure is 134/69 and her pulse is 81. Her respiration is 22 and oxygen saturation is 97%.   Vitals:     06/28/25 0200 06/28/25 0400 06/28/25 0600 06/28/25 0816   BP: 130/75 119/81 126/69 134/69   Pulse: 94 75 96 81   Resp:  18  22   Temp:  98.7 °F (37.1 °C)     TempSrc:  Oral     SpO2:  97%  97%   Weight:       Height:            Anesthesia Evaluation     Patient summary reviewed and Nursing notes reviewed    No history of anesthetic complications   Airway   Mallampati: I  TM distance: >3 FB  Neck ROM: full  Dental      Pulmonary - normal exam   Cardiovascular - normal exam  (+) hypertension    Neuro/Psych      Comments: alcoholism    GI/Hepatic/Renal    (+) chronic renal disease    Endo/Other    (+) hypothyroidism  Abdominal                  Anesthesia Plan:   ASA:  3  Plan:   MAC  Informed Consent Plan and Risks Discussed With:  Patient      I have informed Marissa Rhett and/or legal guardian or family member of the nature of the anesthetic plan, benefits, risks including possible dental damage if relevant, major complications, and any alternative forms of anesthetic management.   All of the patient's questions were answered to the best of my ability. The patient desires the anesthetic management as planned.  Gurdeep Montes De Oca MD  6/28/2025 8:21 AM  Present on Admission:  **None**           [1]   Past Medical History:   Broken wrist    per [employer]:  \"Management:  orif left wrist\"    Disorder of thyroid    Duodenal ulcer    GI bleed    High blood pressure    High cholesterol    Hypothyroidism    Osteoarthritis of left hip    Renal insufficiency    Unspecified essential hypertension    Visual impairment   [2]   Past Surgical History:  Procedure Laterality Date    Colon surgery  03/01/2018    Electrocardiogram, complete  05-    Scanned to Media Tab - Date of Service 05-    Fracture surgery      Hip replacement surgery Left 2010    left hip replacement    Hip replacement surgery Right 2013    right hip replacement    Other Right 09/28/15    ORIF patella    Total hip replacement      Wrist fracture surgery Left  2012   [3]   Facility-Administered Medications Prior to Admission   Medication Dose Route Frequency Provider Last Rate Last Admin    cyanocobalamin (VITAMIN B12) 1000 MCG/ML injection 1,000 mcg  1,000 mcg Intramuscular Q30 Days Alyx Olivo MD         Medications Prior to Admission   Medication Sig Dispense Refill Last Dose/Taking    Ascorbic Acid (GARO-C OR) Take 1 tablet by mouth daily.   6/26/2025 Morning    Cholecalciferol (VITAMIN D-3 OR) Take 1 tablet by mouth daily.   6/26/2025 Morning    Thiamine HCl (VITAMIN B-1 OR) Take 1 tablet by mouth daily.   6/26/2025 Morning    IRON OR Take 1 tablet by mouth daily.   6/26/2025 Morning    amLODIPine 10 MG Oral Tab Take 1 tablet (10 mg total) by mouth daily. 90 tablet 3 6/26/2025 Morning    escitalopram (LEXAPRO) 10 MG Oral Tab Take 1 tablet (10 mg total) by mouth daily. 90 tablet 3 6/26/2025 Morning    levothyroxine 112 MCG Oral Tab Take 1 tablet (112 mcg total) by mouth before breakfast. 90 tablet 3 6/26/2025 Morning    rosuvastatin 5 MG Oral Tab Take 1 tablet (5 mg total) by mouth nightly. (Patient taking differently: Take 1 tablet (5 mg total) by mouth every morning.) 90 tablet 3 6/26/2025 Morning    Losartan Potassium-HCTZ 100-12.5 MG Oral Tab Take 1 tablet by mouth daily. 90 tablet 3 6/26/2025 Morning   [4]   Current Facility-Administered Medications Ordered in Epic   Medication Dose Route Frequency Provider Last Rate Last Admin    amLODIPine (Norvasc) tab 10 mg  10 mg Oral Daily Grzegorz Rubio MD   10 mg at 06/27/25 1050    escitalopram (Lexapro) tab 10 mg  10 mg Oral Daily Grzegorz Rubio MD   10 mg at 06/27/25 1050    levothyroxine (Synthroid) tab 112 mcg  112 mcg Oral Before breakfast Grzegorz Rubio MD   112 mcg at 06/27/25 1051    rosuvastatin (Crestor) tab 5 mg  5 mg Oral QAM Grzegorz Rubio MD   5 mg at 06/27/25 1058    pantoprazole (Protonix) 40 mg in sodium chloride 0.9% PF 10 mL IV push  40 mg Intravenous Q12H Jaye Guillory APRN   40 mg at  06/27/25 2014    sodium chloride 0.9% infusion   Intravenous Continuous Grzegorz Rubio  mL/hr at 06/28/25 0611 New Bag at 06/28/25 0611    heparin (Porcine) 5000 UNIT/ML injection 5,000 Units  5,000 Units Subcutaneous 2 times per day El Palm MD   5,000 Units at 06/27/25 2014    acetaminophen (Tylenol Extra Strength) tab 500 mg  500 mg Oral Q4H PRN El Palm MD        ondansetron (Zofran) 4 MG/2ML injection 4 mg  4 mg Intravenous Q6H PRN El Palm MD        metoclopramide (Reglan) 5 mg/mL injection 5 mg  5 mg Intravenous Q8H PRN El Palm MD        LORazepam (Ativan) tab 1 mg  1 mg Oral Q1H PRN El Palm MD        Or    LORazepam (Ativan) tab 2 mg  2 mg Oral Q1H PRN El Palm MD        thiamine (Vitamin B1) tab 100 mg  100 mg Oral Daily El Palm MD   100 mg at 06/27/25 0819    multivitamin (Tab-A-Bhanu/Beta Carotene) tab 1 tablet  1 tablet Oral Daily El Palm MD   1 tablet at 06/27/25 0819    folic acid (Folvite) tab 1 mg  1 mg Oral Daily El Palm MD   1 mg at 06/27/25 0819    cefTRIAXone (Rocephin) 2 g in sodium chloride 0.9% 100mL IVPB-ASHWINI  2 g Intravenous Q24H El Palm  mL/hr at 06/27/25 1656 2 g at 06/27/25 1656     No current [facility]-ordered outpatient medications on file.   [5] No Known Allergies  [6]   Family History  Problem Relation Age of Onset    Arthritis Mother     Heart Disease Father         Valve repair    Stroke Paternal Grandmother     Diabetes Brother    [7]   Social History  Socioeconomic History    Marital status:    Tobacco Use    Smoking status: Never    Smokeless tobacco: Never    Tobacco comments:     Never smoked   Vaping Use    Vaping status: Never Used   Substance and Sexual Activity    Alcohol use: Yes     Alcohol/week: 7.0 standard drinks of alcohol     Types: 7 Glasses of wine per week     Comment: few days a week    Drug use: Never   Other Topics Concern    Caffeine Concern Yes     Comment:  Coffee, occasionally

## 2025-06-28 NOTE — PLAN OF CARE
Problem: Patient/Family Goals  Goal: Patient/Family Long Term Goal  Description: Patient's Long Term Goal:     Interventions:  -   - See additional Care Plan goals for specific interventions  Outcome: Not Progressing  Goal: Patient/Family Short Term Goal  Description: Patient's Short Term Goal:     Interventions:   -   - See additional Care Plan goals for specific interventions  Outcome: Not Progressing     Problem: SAFETY ADULT - FALL  Goal: Free from fall injury  Description: INTERVENTIONS:  - Assess pt frequently for physical needs  - Identify cognitive and physical deficits and behaviors that affect risk of falls.  - [place] fall precautions as indicated by assessment.  - Educate pt/family on patient safety including physical limitations  - Instruct pt to call for assistance with activity based on assessment  - Modify environment to reduce risk of injury  - Provide assistive devices as appropriate  - Consider OT/PT consult to assist with strengthening/mobility  - Encourage toileting schedule  Outcome: Not Progressing     Problem: DISCHARGE PLANNING  Goal: Discharge to home or other facility with appropriate resources  Description: INTERVENTIONS:  - Identify barriers to discharge w/pt and caregiver  - Include patient/family/discharge partner in discharge planning  - Arrange for needed discharge resources and transportation as appropriate  - Identify discharge learning needs (meds, wound care, etc)  - Arrange for interpreters to assist at discharge as needed  - Consider post-discharge preferences of patient/family/discharge partner  - Complete POLST form as appropriate  - Assess patient's ability to be responsible for managing their own health  - Refer to Case Management Department for coordinating discharge planning if the patient needs post-hospital services based on physician/LIP order or complex needs related to functional status, cognitive ability or social support system  Outcome: Not Progressing      Problem: GASTROINTESTINAL - ADULT  Goal: Minimal or absence of nausea and vomiting  Description: INTERVENTIONS:  - Maintain adequate hydration with IV or PO as ordered and tolerated  - Nasogastric tube to low intermittent suction as ordered  - Evaluate effectiveness of ordered antiemetic medications  - Provide nonpharmacologic comfort measures as appropriate  - Advance diet as tolerated, if ordered  - Obtain nutritional consult as needed  - Evaluate fluid balance  Outcome: Not Progressing  Goal: Maintains adequate nutritional intake (undernourished)  Description: INTERVENTIONS:  - Monitor percentage of each meal consumed  - Identify factors contributing to decreased intake, treat as appropriate  - Assist with meals as needed  - Monitor I&O, WT and lab values  - Obtain nutritional consult as needed  - Optimize oral hygiene and moisture  - Encourage food from home; allow for food preferences  - Enhance eating environment  Outcome: Not Progressing     Problem: METABOLIC/FLUID AND ELECTROLYTES - ADULT  Goal: Electrolytes maintained within normal limits  Description: INTERVENTIONS:  - Monitor labs and rhythm and assess patient for signs and symptoms of electrolyte imbalances  - Administer electrolyte replacement as ordered  - Monitor response to electrolyte replacements, including rhythm and repeat lab results as appropriate  - Fluid restriction as ordered  - Instruct patient on fluid and nutrition restrictions as appropriate  Outcome: Not Progressing     Problem: ANXIETY  Goal: Will report anxiety at manageable levels  Description: INTERVENTIONS:  - Administer medication as ordered  - Teach and rehearse alternative coping skills  - Provide emotional support with 1:1 interaction with staff  Outcome: Not Progressing     Problem: DRUG ABUSE/DETOX  Goal: Will have no detox symptoms and will verbalize plan for changing drug-related behavior  Description: INTERVENTIONS:  - Administer medication as ordered  - Monitor  physical status  - Provide emotional support with 1:1 interaction with staff  - Encourage 12-Step involvement or recovery focused alternative  Outcome: Not Progressing

## 2025-06-28 NOTE — PLAN OF CARE
Problem: Patient/Family Goals  Goal: Patient/Family Long Term Goal  Description: Patient's Long Term Goal:     Interventions:  -   - See additional Care Plan goals for specific interventions  Outcome: Progressing  Goal: Patient/Family Short Term Goal  Description: Patient's Short Term Goal:     Interventions:   -   - See additional Care Plan goals for specific interventions  Outcome: Progressing     Problem: SAFETY ADULT - FALL  Goal: Free from fall injury  Description: INTERVENTIONS:  - Assess pt frequently for physical needs  - Identify cognitive and physical deficits and behaviors that affect risk of falls.  - [place] fall precautions as indicated by assessment.  - Educate pt/family on patient safety including physical limitations  - Instruct pt to call for assistance with activity based on assessment  - Modify environment to reduce risk of injury  - Provide assistive devices as appropriate  - Consider OT/PT consult to assist with strengthening/mobility  - Encourage toileting schedule  Outcome: Progressing     Problem: DISCHARGE PLANNING  Goal: Discharge to home or other facility with appropriate resources  Description: INTERVENTIONS:  - Identify barriers to discharge w/pt and caregiver  - Include patient/family/discharge partner in discharge planning  - Arrange for needed discharge resources and transportation as appropriate  - Identify discharge learning needs (meds, wound care, etc)  - Arrange for interpreters to assist at discharge as needed  - Consider post-discharge preferences of patient/family/discharge partner  - Complete POLST form as appropriate  - Assess patient's ability to be responsible for managing their own health  - Refer to Case Management Department for coordinating discharge planning if the patient needs post-hospital services based on physician/LIP order or complex needs related to functional status, cognitive ability or social support system  Outcome: Progressing     Problem:  GASTROINTESTINAL - ADULT  Goal: Minimal or absence of nausea and vomiting  Description: INTERVENTIONS:  - Maintain adequate hydration with IV or PO as ordered and tolerated  - Nasogastric tube to low intermittent suction as ordered  - Evaluate effectiveness of ordered antiemetic medications  - Provide nonpharmacologic comfort measures as appropriate  - Advance diet as tolerated, if ordered  - Obtain nutritional consult as needed  - Evaluate fluid balance  Outcome: Progressing  Goal: Maintains adequate nutritional intake (undernourished)  Description: INTERVENTIONS:  - Monitor percentage of each meal consumed  - Identify factors contributing to decreased intake, treat as appropriate  - Assist with meals as needed  - Monitor I&O, WT and lab values  - Obtain nutritional consult as needed  - Optimize oral hygiene and moisture  - Encourage food from home; allow for food preferences  - Enhance eating environment  Outcome: Progressing     Problem: METABOLIC/FLUID AND ELECTROLYTES - ADULT  Goal: Electrolytes maintained within normal limits  Description: INTERVENTIONS:  - Monitor labs and rhythm and assess patient for signs and symptoms of electrolyte imbalances  - Administer electrolyte replacement as ordered  - Monitor response to electrolyte replacements, including rhythm and repeat lab results as appropriate  - Fluid restriction as ordered  - Instruct patient on fluid and nutrition restrictions as appropriate  Outcome: Progressing     Problem: ANXIETY  Goal: Will report anxiety at manageable levels  Description: INTERVENTIONS:  - Administer medication as ordered  - Teach and rehearse alternative coping skills  - Provide emotional support with 1:1 interaction with staff  Outcome: Progressing     Problem: DRUG ABUSE/DETOX  Goal: Will have no detox symptoms and will verbalize plan for changing drug-related behavior  Description: INTERVENTIONS:  - Administer medication as ordered  - Monitor physical status  - Provide  emotional support with 1:1 interaction with staff  - Encourage 12-Step involvement or recovery focused alternative  Outcome: Progressing

## 2025-06-28 NOTE — PROGRESS NOTES
[facility]  part of [facility]    Progress Note    Marissa Delaney Patient Status:  Inpatient    1960 MRN [id number]   Location [facility] 5SW/SE Attending Grzegorz Rubio MD   Hosp Day # 2 PCP Alyx Olivo MD     Chief Complaint:   Chief Complaint   Patient presents with    Dizziness    Abdomen/Flank Pain   Dizziness for 3 weeks, falls     Subjective:   Marissa Delaney was seen and examined  Resting in bed comfortably, underwent EGD this AM  Previously tolerating clears, await regular diet   No other complaints     Objective:   Objective:    Blood pressure 133/78, pulse 73, temperature 98.1 °F (36.7 °C), temperature source Oral, resp. rate 18, height 5' 10.98\" (1.803 m), weight 150 lb (68 kg), SpO2 100%, not currently breastfeeding.    Physical Exam:    General: No acute distress.   Respiratory: Clear to auscultation bilaterally. No wheezes. No rhonchi.  Cardiovascular: S1, S2. Regular rate and rhythm. No murmurs, rubs or gallops.   Abdomen: Soft, nontender, nondistended.  Positive bowel sounds. No rebound or guarding.  Neurologic: No focal neurological deficits.   Musculoskeletal: Moves all extremities.  Extremities: No edema.      Results:   Results:    Labs:  Recent Labs   Lab 25  1221 25  0630   WBC 8.8 4.8   HGB 11.5* 9.9*   MCV 96.0 102.4*   .0 286.0       Recent Labs   Lab 25  1221 25  0630 25  0637   GLU 91 87  --    BUN 27* 18  --    CREATSERUM 1.58* 1.16*  --    CA 13.1* 9.9  --    ALB 4.9*  --   --    * 140  --    K 4.3 3.3* 3.4*   CL 99 106  --    CO2 25.0 25.0  --    ALKPHO 67  --   --    AST 23  --   --    ALT 12  --   --    BILT 0.3  --   --    TP 7.8  --   --        Estimated Creatinine Clearance: 51.2 mL/min (A) (based on SCr of 1.16 mg/dL (H)).    No results for input(s): \"PTP\", \"INR\" in the last 168 hours.         Culture:  Hospital Encounter on 25   1. Urine Culture, Routine     Status: Abnormal    Collection Time:  06/26/25 12:21 PM    Specimen: Urine, clean catch   Result Value Ref Range    Urine Culture >100,000 CFU/ML Escherichia coli  ESBL Pos (A) N/A       Susceptibility    Escherichia coli  ESBL Pos -  (no method available)     Cefazolin >=64 Resistant      Cefepime  Resistant      Ceftazidime  Resistant      Ceftriaxone >=64 Resistant      Ciprofloxacin* 0.5 Sensitive       * The current CLSI susceptibility breakpoint for ciprofloxacin is an SHARLA of 0.25 mcg/mL. MICs above this should NOT be considered susceptible. Updated susceptibility reporting is in progress.     Gentamicin <=1 Sensitive      Meropenem <=0.25 Sensitive      Levofloxacin* 1 Sensitive       * The current CLSI susceptibility breakpoint for levofloxacin is an SHARLA of 0.5 mcg/mL. MICs above this should NOT be considered susceptible. Updated susceptibility reporting is in progress.     Nitrofurantoin <=16 Sensitive      Piperacillin + Tazobactam <=4 Sensitive      Trimethoprim/Sulfa >=320 Resistant        Cardiac  No results for input(s): \"TROP\", \"PBNP\" in the last 168 hours.      Imaging: Imaging data reviewed in [facility].  CT BRAIN OR HEAD (CPT=70450)  Result Date: 6/27/2025  CONCLUSION: 1.  8 mm diameter focus of scalp elevation/inflammation in the left frontal/forehead region, which could represent scar or acute/subacute injury. Correlate with clinical history and exam findings. Otherwise, no acute calvarial fracture. No acute intracranial process. 2.  There are mild microvascular white matter ischemic changes, likely related to long-standing hypertension and/or diabetes. 3. Calcific atherosclerosis in the anterior circulation. Electronically Verified and Signed by Attending Radiologist: Sree White MD 6/27/2025 1:43 PM Workstation: [id number]    CT ABDOMEN+PELVIS(CONTRAST ONLY)(CPT=74177)  Result Date: 6/26/2025  CONCLUSION: No CT evidence of acute abnormality within the abdomen/pelvis. Incidental nonacute findings are present and are described within the  body of the report. Electronically Verified and Signed by Attending Radiologist: Kurtis Menjivar MD 6/26/2025 7:48 PM Workstation: [id number]      Medications: Scheduled Medications[1]      Assessment and Plan:   Assessment & Plan:      TIMMY on CKD III   Hypercalcemia   IMPROVED  Likely prerenal azotemia from dehydration   BUN/Creat improving with IVF   Baseline creat 1.2 per EMR   Calcium better likely elevated from tums/dehydration. Check PTH vitamin D  Cont IVF.   UA possible UTI   Hold losartan HTCZ  E coli UTI   Now showing ESBL, will switch to Merrem  Consult ID   Dizziness  Check orthostatic V/S   Cont IVF.   Likely from TIMMY/dehydration, UTI, ETOH abuse   CTH   PT/OT   ETOH abuse   CIWA protocol  PRN ativan   MV, Thiamine, FA  Psych consult.   LLQ abd pain   Acute on chronic anemia   ? Dilutional.   No overt bleeding seen.   Iron panel, ferritin.   GI on consult -> underwent EGD this AM, please see report   CT A/P neg.   H/o PUD   PPI BID    Other medical problems  Depression   Hyperlipidemia   Hypothyroidism   Vitamin D def.      MDM: High    Supplementary Documentation:     Quality:  DVT Prophylaxis: heparin   CODE status: Full   Dispo: per clinical course  Dietitian Malnutrition Assessment    Evaluation for Malnutrition: Criteria for severe malnutrition diagnosis- acute illness/injury related to Wt loss greater than 5% in 1 month., Energy intake less than 50% for greater than 5 days.                 RD Malnutrition Care Plan: Encouraged increased PO intake., Encouraged small frequent meals with emphasis on high calorie/high protein., Intiated ONS (oral nutritional supplements).    Body Fat/Muscle Mass: Mild depletion body fat., Mild depletion muscle mass. orbital region. temple region., clavicle region.    Physician Assessment     Patient has a diagnosis of severe malnutrition         Estimated date of discharge: TBD  Discharge is dependent on: clinical stability  At this point Ms. Delaney is expected to be  discharge to: TBD           [1]    magnesium oxide  400 mg Oral Once    potassium chloride  40 mEq Oral Once    amLODIPine  10 mg Oral Daily    escitalopram  10 mg Oral Daily    levothyroxine  112 mcg Oral Before breakfast    rosuvastatin  5 mg Oral QAM    pantoprazole  40 mg Intravenous Q12H    heparin  5,000 Units Subcutaneous 2 times per day    thiamine  100 mg Oral Daily    multivitamin  1 tablet Oral Daily    folic acid  1 mg Oral Daily    cefTRIAXone  2 g Intravenous Q24H

## 2025-06-28 NOTE — ANESTHESIA POSTPROCEDURE EVALUATION
Patient: Marissa Delaney    Procedure Summary       Date: 06/28/25 Room / Location: [facility] ENDOSCOPY 01 / [facility] ENDOSCOPY    Anesthesia Start: 0901 Anesthesia Stop: 0915    Procedure: ESOPHAGOGASTRODUODENOSCOPY (EGD) Diagnosis: (anemia, hx PUD)    Surgeons: Beltran Mauro MD Anesthesiologist: Gurdeep Montes De Oca MD    Anesthesia Type: MAC ASA Status: 3            Anesthesia Type: MAC    Vitals Value Taken Time   /69 06/28/25 09:15   Temp  06/28/25 09:15   Pulse 68 06/28/25 09:15   Resp 14 06/28/25 09:15   SpO2 100 06/28/25 09:15       [facility] AN Post Evaluation:   Patient Evaluated in PACU  Patient Participation: complete - patient participated  Level of Consciousness: awake  Pain Management: adequate  Airway Patency:patent  Yes    Nausea/Vomiting: none  Cardiovascular Status: acceptable  Respiratory Status: acceptable  Postoperative Hydration acceptable      Gurdeep Montes De Oca MD  6/28/2025 9:15 AM/

## 2025-06-28 NOTE — PLAN OF CARE
Problem: Patient/Family Goals  Goal: Patient/Family Long Term Goal  Description: Patient's Long Term Goal:     Interventions:  -   - See additional Care Plan goals for specific interventions  6/28/2025 1722 by Jailyn Armenta RN  Outcome: Adequate for Discharge  6/28/2025 1104 by Jailyn Armenta RN  Outcome: Progressing  Goal: Patient/Family Short Term Goal  Description: Patient's Short Term Goal:     Interventions:   -   - See additional Care Plan goals for specific interventions  6/28/2025 1722 by Jailyn Armenta RN  Outcome: Adequate for Discharge  6/28/2025 1104 by Jailyn Armenta RN  Outcome: Progressing     Problem: SAFETY ADULT - FALL  Goal: Free from fall injury  Description: INTERVENTIONS:  - Assess pt frequently for physical needs  - Identify cognitive and physical deficits and behaviors that affect risk of falls.  - [place] fall precautions as indicated by assessment.  - Educate pt/family on patient safety including physical limitations  - Instruct pt to call for assistance with activity based on assessment  - Modify environment to reduce risk of injury  - Provide assistive devices as appropriate  - Consider OT/PT consult to assist with strengthening/mobility  - Encourage toileting schedule  6/28/2025 1722 by Jailyn Armenta RN  Outcome: Adequate for Discharge  6/28/2025 1104 by Jailyn Armenta RN  Outcome: Progressing     Problem: DISCHARGE PLANNING  Goal: Discharge to home or other facility with appropriate resources  Description: INTERVENTIONS:  - Identify barriers to discharge w/pt and caregiver  - Include patient/family/discharge partner in discharge planning  - Arrange for needed discharge resources and transportation as appropriate  - Identify discharge learning needs (meds, wound care, etc)  - Arrange for interpreters to assist at discharge as needed  - Consider post-discharge preferences of patient/family/discharge partner  - Complete POLST form as appropriate  - Assess patient's  ability to be responsible for managing their own health  - Refer to Case Management Department for coordinating discharge planning if the patient needs post-hospital services based on physician/LIP order or complex needs related to functional status, cognitive ability or social support system  6/28/2025 1722 by Jailyn Armenta RN  Outcome: Adequate for Discharge  6/28/2025 1104 by Jailyn Armenta RN  Outcome: Progressing     Problem: GASTROINTESTINAL - ADULT  Goal: Minimal or absence of nausea and vomiting  Description: INTERVENTIONS:  - Maintain adequate hydration with IV or PO as ordered and tolerated  - Nasogastric tube to low intermittent suction as ordered  - Evaluate effectiveness of ordered antiemetic medications  - Provide nonpharmacologic comfort measures as appropriate  - Advance diet as tolerated, if ordered  - Obtain nutritional consult as needed  - Evaluate fluid balance  6/28/2025 1722 by Jailyn Armenta RN  Outcome: Adequate for Discharge  6/28/2025 1104 by Jailyn Armenta RN  Outcome: Progressing  Goal: Maintains adequate nutritional intake (undernourished)  Description: INTERVENTIONS:  - Monitor percentage of each meal consumed  - Identify factors contributing to decreased intake, treat as appropriate  - Assist with meals as needed  - Monitor I&O, WT and lab values  - Obtain nutritional consult as needed  - Optimize oral hygiene and moisture  - Encourage food from home; allow for food preferences  - Enhance eating environment  6/28/2025 1722 by Jailyn Armenta RN  Outcome: Adequate for Discharge  6/28/2025 1104 by Jailyn Armenta RN  Outcome: Progressing     Problem: METABOLIC/FLUID AND ELECTROLYTES - ADULT  Goal: Electrolytes maintained within normal limits  Description: INTERVENTIONS:  - Monitor labs and rhythm and assess patient for signs and symptoms of electrolyte imbalances  - Administer electrolyte replacement as ordered  - Monitor response to electrolyte replacements, including  rhythm and repeat lab results as appropriate  - Fluid restriction as ordered  - Instruct patient on fluid and nutrition restrictions as appropriate  6/28/2025 1722 by Jailyn Armenta RN  Outcome: Adequate for Discharge  6/28/2025 1104 by Jailyn Armenta RN  Outcome: Progressing     Problem: ANXIETY  Goal: Will report anxiety at manageable levels  Description: INTERVENTIONS:  - Administer medication as ordered  - Teach and rehearse alternative coping skills  - Provide emotional support with 1:1 interaction with staff  6/28/2025 1722 by Jailyn Armenta RN  Outcome: Adequate for Discharge  6/28/2025 1104 by Jailyn Armenta RN  Outcome: Progressing     Problem: DRUG ABUSE/DETOX  Goal: Will have no detox symptoms and will verbalize plan for changing drug-related behavior  Description: INTERVENTIONS:  - Administer medication as ordered  - Monitor physical status  - Provide emotional support with 1:1 interaction with staff  - Encourage 12-Step involvement or recovery focused alternative  6/28/2025 1722 by Jailyn Armenta RN  Outcome: Adequate for Discharge  6/28/2025 1104 by Jailyn Armenta RN  Outcome: Progressing

## 2025-06-28 NOTE — INTERVAL H&P NOTE
Pre-op Diagnosis: anemia, hx PUD    The above referenced H&P was reviewed by Beltran Mauro MD on 6/28/2025, the patient was examined and no significant changes have occurred in the patient's condition since the H&P was performed.  I discussed with the patient and/or legal representative the potential benefits, risks and side effects of this procedure; the likelihood of the patient achieving goals; and potential problems that might occur during recuperation.  I discussed reasonable alternatives to the procedure, including risks, benefits and side effects related to the alternatives and risks related to not receiving this procedure.  We will proceed with procedure as planned.

## 2025-06-28 NOTE — OPERATIVE REPORT
ESOPHAGOGASTRODUODENOSCOPY (EGD) REPORT    Marissa Delaney     1960 Age 65 year old   PCP Alyx Olivo MD Endoscopist Beltran Mauro MD     Date of procedure: 25    Procedure: EGD with biopsies    Pre-operative diagnosis: acute on chronic anemia, hx of ulcers    Post-operative diagnosis: healed ulcer in stomach, chronic ulcer in duodenal bulb    Medications: MAC    Complications: none    Procedure:  Informed consent was obtained from the patient after the risks of the procedure were discussed, including but not limited to bleeding, perforation, aspiration, infection, or possibility of a missed lesion. After discussions of the risks/benefits and alternatives to this procedure, as well as the planned sedation, the patient was placed in the left lateral decubitus position and begun on continuous blood pressure pulse oximetry and EKG monitoring and this was maintained throughout the procedure. Once an adequate level of sedation was obtained a bite block was placed. Then the lubricated tip of the [id number] diagnostic video upper endoscope was inserted and advanced using direct visualization into the posterior pharynx and ultimately into the esophagus, stomach, and duodenum.    Complications: None  Blood loss: <1 ml    Findings:      1. Esophagus: The squamocolumnar junction was noted at 40 cm and appeared regular. The GE junction was noted at 40 cm from the incisors, one little island of salmon mucosa not biopsied given indication of procedure    2. Stomach: Sutures in antrum from prior surgery, no ulcerations or erosions in antrum. No gastric ulcer seen, healed. Biopsy taken of the erythema to rule out Hpylori. A retroflexed view allowed examination of the angularis, cardia and fundus and these areas appeared normal with a non-patulous cardia. No hiatal hernia seen.      3. Duodenum: A 15 mm clean based ulcer in the duodenal bulb, non-bleeding. The duodenal mucosa appeared normal in the 2nd portion of  the duodenum but thickened, biopsies taken. Bilious effluent in second portion.     We then withdrew the instrument from the patient who tolerated the procedure well.     Impression:   1.Healed gastric ulcer  2. Clean based 15 mm duodenal bulb ulcer and duodenal bulb thickening biopsied    Recommend:  1. Continue ppi bid  2. No nsaids  3. Stop ETOH  4. EGD in 6 months to 1 year to r/o mccoy's    >>>If tissue was sampled/removed and you have not received your pathology results either by phone or letter within 2 weeks, please call our office at [phone].    Specimens:  Gastric and duodenal biopsies      Beltran Mauro MD

## 2025-06-28 NOTE — CONSULTS
[facility]  part of [facility] ID CONSULT NOTE    Marissa Delaney Patient Status:  Inpatient    1960 MRN [id number]   Location [facility] 5SW/SE Attending Zhen Castellano MD   Hosp Day # 2 PCP Alyx Olivo MD       Reason for Consultation:  ESBL E. coli UTI    ASSESSMENT:    Antibiotics:   Meropenem    # ESBL E. coli UTI  # History of peptic ulcer disease  # TIMMY on CKD  # Alcohol use      PLAN:    -Continue meropenem.  -Okay for discharge from infectious disease standpoint.  -Prescription sent for p.o. fosfomycin  -Follow fever curve, wbc  -Reviewed labs, micro, imaging reports, available old records  -Discussed with primary attending and RN    History of Present Illness:  Marissa Delaney is a a(n) 65 year old female with history of hypertension, hyperlipidemia, CKD, heavy alcohol use, and peptic ulcer disease who presented to the hospital on  after becoming lightheaded and had a presyncopal episode.    In the ED, patient was afebrile hemodynamically stable.  Labs with creatinine 1.16, WBC 4.8, hemoglobin 9.9, urinalysis with 2+ nitrate, 250 leukocyte esterase, and greater than 50 WBC.    Urine culture with ESBL E. coli.  Infectious disease consulted for evaluation    History:  Past Medical History[1]  Past Surgical History[2]  Family History[3]   reports that she has never smoked. She has never used smokeless tobacco. She reports current alcohol use of about 7.0 standard drinks of alcohol per week. She reports that she does not use drugs.    Allergies:  Allergies[4]    Medications:  Current Hospital Medications[5]      Physical Exam:  Vital signs: Blood pressure 133/78, pulse 73, temperature 98.1 °F (36.7 °C), temperature source Oral, resp. rate 18, height 180.3 cm (5' 10.98\"), weight 150 lb (68 kg), SpO2 100%, not currently breastfeeding.    Physical Exam  Constitutional:       General: She is not in acute distress.  Pulmonary:      Effort: No respiratory distress.    Abdominal:      Palpations: Abdomen is soft.      Tenderness: There is no abdominal tenderness.   Neurological:      Mental Status: She is alert.         Laboratory Data: Reviewed in EMR    Microbiology: Reviewed in EMR    Radiology: Reviewed    Thank you for allowing us to participate in the care of this patient. Please do not hesitate to call if you have any questions.     We will continue to follow with you and will make further recommendations based on patient's progress.    Adrian Garvey MD   [facility]  [phone]  6/28/2025         [1]   Past Medical History:   Broken wrist    per [employer]:  \"Management:  orif left wrist\"    Disorder of thyroid    Duodenal ulcer    GI bleed    High blood pressure    High cholesterol    Hypothyroidism    Osteoarthritis of left hip    Renal insufficiency    Unspecified essential hypertension    Visual impairment   [2]   Past Surgical History:  Procedure Laterality Date    Colon surgery  03/01/2018    Electrocardiogram, complete  05-    Scanned to Media Tab - Date of Service 05-    Fracture surgery      Hip replacement surgery Left 2010    left hip replacement    Hip replacement surgery Right 2013    right hip replacement    Other Right 09/28/15    ORIF patella    Total hip replacement      Wrist fracture surgery Left 2012   [3]   Family History  Problem Relation Age of Onset    Arthritis Mother     Heart Disease Father         Valve repair    Stroke Paternal Grandmother     Diabetes Brother    [4] No Known Allergies  [5]   Current Facility-Administered Medications:     magnesium oxide (Mag-Ox) tab 400 mg, 400 mg, Oral, Once    potassium chloride (Klor-Con M20) tab 40 mEq, 40 mEq, Oral, Once    meropenem (Merrem) 500 mg in sodium chloride 0.9% 100mL IVPB-ASHWINI, 500 mg, Intravenous, Q8H    melatonin cap/tab 5 mg, 5 mg, Oral, Nightly PRN    amLODIPine (Norvasc) tab 10 mg, 10 mg, Oral, Daily    escitalopram (Lexapro) tab 10 mg, 10 mg,  Oral, Daily    levothyroxine (Synthroid) tab 112 mcg, 112 mcg, Oral, Before breakfast    rosuvastatin (Crestor) tab 5 mg, 5 mg, Oral, QAM    pantoprazole (Protonix) 40 mg in sodium chloride 0.9% PF 10 mL IV push, 40 mg, Intravenous, Q12H    sodium chloride 0.9% infusion, , Intravenous, Continuous    heparin (Porcine) 5000 UNIT/ML injection 5,000 Units, 5,000 Units, Subcutaneous, 2 times per day    acetaminophen (Tylenol Extra Strength) tab 500 mg, 500 mg, Oral, Q4H PRN    ondansetron (Zofran) 4 MG/2ML injection 4 mg, 4 mg, Intravenous, Q6H PRN    metoclopramide (Reglan) 5 mg/mL injection 5 mg, 5 mg, Intravenous, Q8H PRN    LORazepam (Ativan) tab 1 mg, 1 mg, Oral, Q1H PRN **OR** LORazepam (Ativan) tab 2 mg, 2 mg, Oral, Q1H PRN    thiamine (Vitamin B1) tab 100 mg, 100 mg, Oral, Daily    multivitamin (Tab-A-Bhanu/Beta Carotene) tab 1 tablet, 1 tablet, Oral, Daily    folic acid (Folvite) tab 1 mg, 1 mg, Oral, Daily

## 2025-06-28 NOTE — PROGRESS NOTES
Writer completed discharge with patient. All discharge paperwork informed to patient. Verbal understanding given. No further questions at this time. Patients vitals signs within baseline. Iv Picc removed. No active bleeding noted. Dressing applied. Patient discharged home via . All personal belongings gathered and taken with.

## 2025-06-30 ENCOUNTER — PATIENT OUTREACH (OUTPATIENT)
Dept: CASE MANAGEMENT | Age: 65
End: 2025-06-30

## 2025-06-30 NOTE — PROGRESS NOTES
Transitional Care Management   Discharge Date: 25  Contact Date: 2025    Assessment:  (Insert appropriate Smartphrase below after completing flowsheet)  TCM Initial Assessment    General:  Assessment completed with: Patient  Patient Subjective: The patient states she is feeling okay.Drinking alot of water.  Chief Complaint: Duodenal erosion  Acute on chronic anemia   Hypercalcemia  Acute cystitis without hematuria  TIMMY (acute kidney injury)  Alcoholism (HCC)  Gastric erythema  Verify patient name and  with patient/ caregiver: Yes    Hospital Stay/Discharge:  Tell me what you understand of why you were in the hospital or emergency department: Dizziness due to high calcium and dehydration.  Prior to leaving the hospital were your Discharge Instructions reviewed with you?: Yes  Did you receive a copy of your written Discharge Instructions?: Yes  What questions do you have about your Discharge Instructions?: None  Do you feel better or worse since you left the hospital or emergency department?: Better    Follow - Up Appointment:  Do you have a follow-up appointment?: No  Are there any barriers to getting to your follow-up appointment?: No    Home Health/DME:  Prior to leaving the hospital was Home Health (HH) arranged for you?: No     Prior to leaving the hospital or emergency department was Durable Medical Equipment (DME), medical supplies, or infusions arranged for you?: No  Are DME/medical supply/infusions needs identified by staff during this assessment?: No     Medications/Diet:       Were you given a different diet per your Discharge Instructions?: No     Questions/Concerns:  Do you have any questions or concerns that have not been discussed?: No       Follow-up Appointments:  Your appointments       Date & Time Appointment Department (Center)    2025 10:00 AM [facility] Follow Up with Alyx Olivo MD [facility] ([facility] [place])         Oct 07, 2025 2:15 PM CDT Medicare Annual Health Assessment with Alyx Olivo MD [facility] ([facility])              [facility]  [address] [address]  [phone]          Transitional Care Clinic  Was TCC Ordered: No  Was TCC Scheduled: No   - If yes: []  Advised patient to bring all medications and blood glucose meter/supplies if applicable.    Primary Care Provider (If no TCC appointment)  Does patient already have a PCP appointment scheduled? No  Care Manager Scheduled PCP office TCM appointment with patient     Specialist  Does the patient have any other follow-up appointment(s) that need to be scheduled? Yes   -If yes: Care Manager reviewed upcoming specialist appointments with patient: Yes   -Does the patient need assistance scheduling appointment(s): No      Book By Date: 7/11/25

## 2025-06-30 NOTE — TELEPHONE ENCOUNTER
RN called and spoke to pt. Pt scheduled for clinic appt on 7/21/25. Date, time, and location verified with pt. Pt verbalized understanding.    Your Appointments        Monday July 21, 2025 9:30 AM  Follow Up Visit with Suri Weller PA-C  [facility] ([facility]) [facility] [address] [address]  [phone]

## 2025-07-07 ENCOUNTER — OFFICE VISIT (OUTPATIENT)
Dept: FAMILY MEDICINE CLINIC | Facility: CLINIC | Age: 65
End: 2025-07-07

## 2025-07-07 ENCOUNTER — LAB ENCOUNTER (OUTPATIENT)
Dept: LAB | Age: 65
End: 2025-07-07
Attending: FAMILY MEDICINE
Payer: MEDICARE

## 2025-07-07 VITALS
SYSTOLIC BLOOD PRESSURE: 132 MMHG | HEART RATE: 81 BPM | HEIGHT: 71 IN | WEIGHT: 158 LBS | BODY MASS INDEX: 22.12 KG/M2 | DIASTOLIC BLOOD PRESSURE: 68 MMHG

## 2025-07-07 DIAGNOSIS — I10 ESSENTIAL HYPERTENSION: Primary | ICD-10-CM

## 2025-07-07 DIAGNOSIS — L98.9 SKIN LESION: ICD-10-CM

## 2025-07-07 DIAGNOSIS — E83.52 HYPERCALCEMIA: ICD-10-CM

## 2025-07-07 DIAGNOSIS — F10.20 ALCOHOLISM (HCC): ICD-10-CM

## 2025-07-07 DIAGNOSIS — N17.9 AKI (ACUTE KIDNEY INJURY): ICD-10-CM

## 2025-07-07 DIAGNOSIS — Z87.19 HISTORY OF GI BLEED: ICD-10-CM

## 2025-07-07 DIAGNOSIS — E78.2 MIXED HYPERLIPIDEMIA: ICD-10-CM

## 2025-07-07 DIAGNOSIS — I10 ESSENTIAL HYPERTENSION: ICD-10-CM

## 2025-07-07 LAB
ALBUMIN SERPL-MCNC: 5 G/DL (ref 3.2–4.8)
ALBUMIN/GLOB SERPL: 1.9 {RATIO} (ref 1–2)
ALP LIVER SERPL-CCNC: 61 U/L (ref 55–142)
ALT SERPL-CCNC: 16 U/L (ref 10–49)
ANION GAP SERPL CALC-SCNC: 12 MMOL/L (ref 0–18)
AST SERPL-CCNC: 32 U/L (ref ?–34)
BASOPHILS # BLD AUTO: 0.05 X10(3) UL (ref 0–0.2)
BASOPHILS NFR BLD AUTO: 0.7 %
BILIRUB SERPL-MCNC: 0.6 MG/DL (ref 0.2–1.1)
BUN BLD-MCNC: 15 MG/DL (ref 9–23)
BUN/CREAT SERPL: 13 (ref 10–20)
CALCIUM BLD-MCNC: 9.4 MG/DL (ref 8.7–10.4)
CHLORIDE SERPL-SCNC: 105 MMOL/L (ref 98–112)
CO2 SERPL-SCNC: 22 MMOL/L (ref 21–32)
CREAT BLD-MCNC: 1.15 MG/DL (ref 0.55–1.02)
DEPRECATED RDW RBC AUTO: 43.9 FL (ref 35.1–46.3)
EGFRCR SERPLBLD CKD-EPI 2021: 53 ML/MIN/1.73M2 (ref 60–?)
EOSINOPHIL # BLD AUTO: 0.05 X10(3) UL (ref 0–0.7)
EOSINOPHIL NFR BLD AUTO: 0.7 %
ERYTHROCYTE [DISTWIDTH] IN BLOOD BY AUTOMATED COUNT: 11.9 % (ref 11–15)
FASTING STATUS PATIENT QL REPORTED: YES
GLOBULIN PLAS-MCNC: 2.7 G/DL (ref 2–3.5)
GLUCOSE BLD-MCNC: 99 MG/DL (ref 70–99)
HCT VFR BLD AUTO: 34.7 % (ref 35–48)
HGB BLD-MCNC: 11.4 G/DL (ref 12–16)
IMM GRANULOCYTES # BLD AUTO: 0.03 X10(3) UL (ref 0–1)
IMM GRANULOCYTES NFR BLD: 0.4 %
LYMPHOCYTES # BLD AUTO: 0.93 X10(3) UL (ref 1–4)
LYMPHOCYTES NFR BLD AUTO: 13.2 %
MCH RBC QN AUTO: 33.1 PG (ref 26–34)
MCHC RBC AUTO-ENTMCNC: 32.9 G/DL (ref 31–37)
MCV RBC AUTO: 100.9 FL (ref 80–100)
MONOCYTES # BLD AUTO: 0.62 X10(3) UL (ref 0.1–1)
MONOCYTES NFR BLD AUTO: 8.8 %
NEUTROPHILS # BLD AUTO: 5.39 X10 (3) UL (ref 1.5–7.7)
NEUTROPHILS # BLD AUTO: 5.39 X10(3) UL (ref 1.5–7.7)
NEUTROPHILS NFR BLD AUTO: 76.2 %
OSMOLALITY SERPL CALC.SUM OF ELEC: 289 MOSM/KG (ref 275–295)
PLATELET # BLD AUTO: 410 10(3)UL (ref 150–450)
POTASSIUM SERPL-SCNC: 4.8 MMOL/L (ref 3.5–5.1)
PROT SERPL-MCNC: 7.7 G/DL (ref 5.7–8.2)
RBC # BLD AUTO: 3.44 X10(6)UL (ref 3.8–5.3)
SODIUM SERPL-SCNC: 139 MMOL/L (ref 136–145)
WBC # BLD AUTO: 7.1 X10(3) UL (ref 4–11)

## 2025-07-07 PROCEDURE — 85025 COMPLETE CBC W/AUTO DIFF WBC: CPT

## 2025-07-07 PROCEDURE — 36415 COLL VENOUS BLD VENIPUNCTURE: CPT

## 2025-07-07 PROCEDURE — 80053 COMPREHEN METABOLIC PANEL: CPT

## 2025-07-07 NOTE — PROGRESS NOTES
Subjective:   Marissa Delaney is a 65 year old female who presents for hospital follow up.   She was discharged from [facility] to Home or Self Care  Admission Date: 6/26/25   Discharge Date: 6/28/25  Hospital Discharge Diagnosis: hypercalcemia, alcoholism, gi bleed.     Interactive contact within 2 business days post discharge first initiated on Date: 6/30/2025    I accessed [employer] and/or [email or web address] Everywhere and personally reviewed the following for the recent hospitalization: provider notes, consults, summaries, labs and other test results and the pertinent findings are documented below.     HPI:   Discharge summary  \"History of Present Illness:   Per Dr. Palm  Patient is a 65-year-old  female, came into the emergency department because of lightheadedness and presyncopal episode earlier today.  CBC showed no leukocytosis or left shift.  Hemoglobin 11.5 which is roughly around her baseline.  Chemistry today showed GFR of 36 which is below her baseline.  BUN 27 and creatinine 1.58.  Calcium 13.1.  CT scan of the abdomen and pelvis still pending.  EKG showed normal sinus rhythm.  No acute ST-T changes.  Hospital Course:   TIMMY on CKD III   Hypercalcemia   IMPROVED  Likely prerenal azotemia from dehydration   BUN/Creat improved with IVF   Baseline creat 1.2 per EMR   Calcium better likely elevated from tums/dehydration  Rec'd IVF  Ucx growing esbl e.coli  Hold losartan HTCZ  E coli UTI   ESBL+, ID consulted  Stable for dc on fosfomycin  Dizziness  Improved, refused PT/OT  ETOH abuse   Psych was consulted  Counseled on cessation  LLQ abd pain   Acute on chronic anemia   RESOLVED  GI on consult -> underwent EGD this AM, please see report   CT A/P neg.   Cont PPI BID  H/o PUD   PPI BID     Other medical problems  Depression   Hyperlipidemia   Hypothyroidism   Vitamin D def.  \"    Here for follow up.   Feeling much better- drinking more water. Cut back on alcohol   History/Other:   Current Medications:  Medication  Reconciliation:  I am aware of an inpatient discharge within the last 30 days.  The discharge medication list has been reconciled with the patient's current medication list and reviewed by me. See medication list for additions of new medication, and changes to current doses of medications and discontinued medications.  Outpatient Medications Marked as Taking for the 7/7/25 encounter (Office Visit) with Alyx Olivo MD   Medication Sig    folic acid 1 MG Oral Tab Take 1 tablet (1 mg total) by mouth daily.    thiamine 100 MG Oral Tab Take 1 tablet (100 mg total) by mouth daily.    pantoprazole 40 MG Oral Tab EC Take 1 tablet (40 mg total) by mouth 2 (two) times daily before meals.    Ascorbic Acid (GARO-C OR) Take 1 tablet by mouth daily.    Cholecalciferol (VITAMIN D-3 OR) Take 1 tablet by mouth daily.    Thiamine HCl (VITAMIN B-1 OR) Take 1 tablet by mouth daily.    IRON OR Take 1 tablet by mouth daily.    amLODIPine 10 MG Oral Tab Take 1 tablet (10 mg total) by mouth daily.    levothyroxine 112 MCG Oral Tab Take 1 tablet (112 mcg total) by mouth before breakfast.    rosuvastatin 5 MG Oral Tab Take 1 tablet (5 mg total) by mouth nightly. (Patient taking differently: Take 1 tablet (5 mg total) by mouth every morning.)     Current Facility-Administered Medications for the 7/7/25 encounter (Office Visit) with Alyx Olivo MD   Medication    cyanocobalamin (VITAMIN B12) 1000 MCG/ML injection 1,000 mcg       Review of Systems:  GENERAL: weight stable, energy stable - feeling better     LUNGS: denies shortness of breath with exertion  CARDIOVASCULAR: denies chest pain on exertion or palpitations  GI: denies abdominal pain, denies heartburn, denies diarrhea    No LMP recorded. Patient is postmenopausal.  Estimated body mass index is 22.04 kg/m² as calculated from the following:    Height as of this encounter: 5' 11\" (1.803 m).    Weight as of this encounter: 158 lb (71.7 kg).   /77   Pulse 81   Ht 5' 11\"  (1.803 m)   Wt 158 lb (71.7 kg)   BMI 22.04 kg/m²    /68   Pulse 81   Ht 5' 11\" (1.803 m)   Wt 158 lb (71.7 kg)   BMI 22.04 kg/m²     GENERAL: well developed, well nourished, in no apparent distress  SKIN: tanned skin. Right arm crusted lesion - 8 mm   LUNGS: clear to auscultation  CARDIO: RRR without murmur  No edema     Assessment & Plan:   1. Essential hypertension  Stable   - Comp Metabolic Panel (14) [E]; Future    2. Mixed hyperlipidemia      3. Alcoholism (HCC)  Encouraged pt to quit completely   - Comp Metabolic Panel (14) [E]; Future    4. TIMMY (acute kidney injury)    - Comp Metabolic Panel (14) [E]; Future    5. History of GI bleed      6. Hypercalcemia  Repeat labs. Discussed staying well hydrated   - Comp Metabolic Panel (14) [E]; Future  - CBC With Differential With Platelet; Future    7. Skin lesion  Concerning for pre-cancerous or cancerous lesion. Explained to pt/   - Derm Referral - In Network        Return in 3 months (on 10/7/2025).

## (undated) DIAGNOSIS — I10 ESSENTIAL HYPERTENSION: ICD-10-CM

## (undated) DIAGNOSIS — E78.5 HYPERLIPIDEMIA, UNSPECIFIED HYPERLIPIDEMIA TYPE: ICD-10-CM

## (undated) DIAGNOSIS — E03.9 HYPOTHYROIDISM, UNSPECIFIED TYPE: Primary | ICD-10-CM

## (undated) DEVICE — 60 ML SYRINGE REGULAR TIP: Brand: MONOJECT

## (undated) DEVICE — CONMED SCOPE SAVER BITE BLOCK, 20X27 MM: Brand: SCOPE SAVER

## (undated) DEVICE — [HIGH FLOW INSUFFLATOR,  DO NOT USE IF PACKAGE IS DAMAGED,  KEEP DRY,  KEEP AWAY FROM SUNLIGHT,  PROTECT FROM HEAT AND RADIOACTIVE SOURCES.]: Brand: PNEUMOSURE

## (undated) DEVICE — UNDYED BRAIDED (POLYGLACTIN 910), SYNTHETIC ABSORBABLE SUTURE: Brand: COATED VICRYL

## (undated) DEVICE — TROCAR: Brand: KII® SLEEVE

## (undated) DEVICE — SUTURE VICRYL 0 UR-6

## (undated) DEVICE — DRAIN SILICONE FLAT 10MM

## (undated) DEVICE — KIT ENDO ORCAPOD 160/180/190

## (undated) DEVICE — MEDI-VAC NON-CONDUCTIVE SUCTION TUBING 6MM X 1.8M (6FT.) L: Brand: CARDINAL HEALTH

## (undated) DEVICE — X-STREAM LAPAROSCOPIC IRRIGATION TUBING SET WITH NEZHAT-DORSEY TRUMPET VALVE, AND COJOINED SUCTION/IRRIGATION TUBING, WITHOUT PROBE TIP: Brand: X-STREAM

## (undated) DEVICE — VIOLET BRAIDED (POLYGLACTIN 910), SYNTHETIC ABSORBABLE SUTURE: Brand: COATED VICRYL

## (undated) DEVICE — YANKAUER,BULB TIP,W/O VENT,RIGID,STERILE: Brand: MEDLINE

## (undated) DEVICE — CLIP LGT 11MM OPEN 2.8MM 235CM

## (undated) DEVICE — CATH GOLD PROBE HEMOGLIDE 7FR

## (undated) DEVICE — 12 ML SYRINGE LUER-LOCK TIP: Brand: MONOJECT

## (undated) DEVICE — TROCAR: Brand: KII SHIELDED BLADED ACCESS SYSTEM

## (undated) DEVICE — GOWN SURG AERO BLUE PERF LG

## (undated) DEVICE — SUTURE SILK 2-0 SA65H

## (undated) DEVICE — SUCTION CANISTER, 3000CC,SAFELINER: Brand: DEROYAL

## (undated) DEVICE — BETADINE SOL 32 OZ 10% POVIDON

## (undated) DEVICE — CAUTERY BLADE 2IN INS E1455

## (undated) DEVICE — SYRINGE/GUAGE ASSEMBLY

## (undated) DEVICE — NEEDLE CONTRAST INTERJECT 25G

## (undated) DEVICE — Device: Brand: DEFENDO AIR/WATER/SUCTION AND BIOPSY VALVE

## (undated) DEVICE — ENDOSCOPY PACK UPPER: Brand: MEDLINE INDUSTRIES, INC.

## (undated) DEVICE — DEVICE PORT SITE CLOSURE

## (undated) DEVICE — SOL  .9 1000ML BTL

## (undated) DEVICE — V2 SPECIMEN COLLECTION MANIFOLD KIT: Brand: NEPTUNE

## (undated) DEVICE — SOLUTION ENDOSCOPIC ANTI-FOG NON-TOXIC NON-ABRASIVE 6 CUBIC CENTIMETER WITH RADIOPAQUE ADHESIVE-BACKED SPONGE STERILE NOT MADE WITH NATURAL RUBBER LATEX MEDICHOICE: Brand: MEDICHOICE

## (undated) DEVICE — KIT CLEAN ENDOKIT 1.1OZ GOWNX2

## (undated) DEVICE — MEDI-VAC NON-CONDUCTIVE SUCTION TUBING: Brand: CARDINAL HEALTH

## (undated) DEVICE — STERILE LATEX POWDER-FREE SURGICAL GLOVESWITH NITRILE COATING: Brand: PROTEXIS

## (undated) DEVICE — Device

## (undated) DEVICE — SUTURE SILK PERMA 2-0 D7793

## (undated) DEVICE — SUTURE VICRYL 1 CT-1

## (undated) DEVICE — SUTURE SILK 2-0 685H

## (undated) DEVICE — GIJAW SINGLE-USE BIOPSY FORCEPS WITH NEEDLE: Brand: GIJAW

## (undated) DEVICE — 3M™ STERI-STRIP™ REINFORCED ADHESIVE SKIN CLOSURES, R1547, 1/2 IN X 4 IN (12 MM X 100 MM), 6 STRIPS/ENVELOPE: Brand: 3M™ STERI-STRIP™

## (undated) DEVICE — DRAPE SHEET LAPCHOLE 124X100X7

## (undated) DEVICE — SUTURE SILK 0

## (undated) DEVICE — 9534HP TRANSPARENT DRSG W/FRAME: Brand: 3M™ TEGADERM™

## (undated) DEVICE — SOL  .9 3000ML

## (undated) DEVICE — 3M™ TEGADERM™ TRANSPARENT FILM DRESSING, 1626W, 4 IN X 4-3/4 IN (10 CM X 12 CM), 50 EACH/CARTON, 4 CARTON/CASE: Brand: 3M™ TEGADERM™

## (undated) DEVICE — Device: Brand: DUAL NARE NASAL CANNULAE FEMALE LUER CON 7FT O2 TUBE

## (undated) DEVICE — DRAIN RESERVOIR RELIAVAC 100CC

## (undated) NOTE — LETTER
[address]  [facility] [address], [place], [place]  [address]  INFORMED CONSENT FOR TRANSFUSION OF BLOOD OR BLOOD PRODUCTS  My physician has informed me of the nature, purpose, benefits and risks of transfusion for blood and blood components that ______________________________________________  (Signature of Patient)                                                            (Responsible party in case of Minor,

## (undated) NOTE — LETTER
[place] [address]  Authorization for Invasive Procedures  Date: 1/10/2024           Time: 1033    I hereby authorize Dr Mary, my physician and his/her assistants (if applicable), which may include medical students, residents, and/or fellows, to perform the following surgical operation/ procedure and administer such anesthesia as may be determined necessary by my physician: esophagogastroduodenoscopy (EGD) on Marissa Delaney  2.   I recognize that during the surgical operation/procedure, unforeseen conditions may necessitate additional or different procedures than those listed above.  I, therefore, further authorize and request that the above-named surgeon, assistants, or designees perform such procedures as are, in their judgment, necessary and desirable.    3.   My surgeon/physician has discussed prior to my surgery the potential benefits, risks and side effects of this procedure; the likelihood of achieving goals; and potential problems that might occur during recuperation.  They also discussed reasonable alternatives to the procedure, including risks, benefits, and side effects related to the alternatives and risks related to not receiving this procedure.  I have had all my questions answered and I acknowledge that no guarantee has been made as to the result that may be obtained.    4.   Should the need arise during my operation/procedure, which includes change of level of care prior to discharge, I also consent to the administration of blood and/or blood products.  Further, I understand that despite careful testing and screening of blood or blood products by collecting agencies, I may still be subject to ill effects as a result of receiving a blood transfusion and/or blood products.  The following are some, but not all, of the potential risks that can occur: fever and allergic reactions, hemolytic reactions, transmission of diseases such as Hepatitis, AIDS and Cytomegalovirus (CMV) and  fluid overload.  In the event that I wish to have an autologous transfusion of my own blood, or a directed donor transfusion, I will discuss this with my physician.   Check only if Refusing Blood or Blood Products  I understand refusal of blood or blood products as deemed necessary by my physician may have serious consequences to my condition to include possible death. I hereby assume responsibility for my refusal and release the hospital, its personnel, and my physicians from any responsibility for the consequences of my refusal.         o  Refuse         5.   I authorize the use of any specimen, organs, tissues, body parts or foreign objects that may be removed from my body during the operation/procedure for diagnosis, research or teaching purposes and their subsequent disposal by hospital authorities.  I also authorize the release of specimen test results and/or written reports to my treating physician on the hospital medical staff or other referring or consulting physicians involved in my care, at the discretion of the Pathologist or my treating physician.    6.   I consent to the photographing or videotaping of the operations or procedures to be performed, including appropriate portions of my body for medical, scientific, or educational purposes, provided my identity is not revealed by the pictures or by descriptive texts accompanying them.  If the procedure has been photographed/videotaped, the surgeon will obtain the original picture, image, videotape or CD.  The hospital will not be responsible for storage, release or maintenance of the picture, image, tape or CD.    7.   I consent to the presence of a  or observers in the operating room as deemed necessary by my physician or their designees.    8.   I recognize that in the event my procedure results in extended X-Ray/fluoroscopy time, I may develop a skin reaction.    9. If I have a Do Not Attempt Resuscitation (DNAR) order in place, that  status will be suspended while in the operating room, procedural suite, and during the recovery period unless otherwise explicitly stated by me (or a person authorized to consent on my behalf). The surgeon or my attending physician will determine when the applicable recovery period ends for purposes of reinstating the DNAR order.  10. Patients having a sterilization procedure: I understand that if the procedure is successful the results will be permanent and it will therefore be impossible for me to inseminate, conceive, or bear children.  I also understand that the procedure is intended to result in sterility, although the result has not been guaranteed.   11. I acknowledge that my physician has explained sedation/analgesia administration to me including the risk and benefits I consent to the administration of sedation/analgesia as may be necessary or desirable in the judgment of my physician.    I CERTIFY THAT I HAVE READ AND FULLY UNDERSTAND THE ABOVE CONSENT TO OPERATION and/or OTHER PROCEDURE.        ____________________________________       _________________________________      ______________________________  Signature of Patient         Signature of Responsible Person        Printed Name of Responsible Person        ____________________________________      _________________________________      ______________________________       Signature of Witness          Relationship to Patient                       Date                                       Time    Patient Name: Marissa Delaney     : 1960                 Printed: January 10, 2024      Medical Record #: [id number]                      Page 1 of 2          STATEMENT OF PHYSICIAN My signature below affirms that prior to the time of the procedure; I have explained to the patient and/or his/her legal representative, the risks and benefits involved in the proposed treatment and any reasonable alternative to the proposed treatment. I have also explained  the risks and benefits involved in refusal of the proposed treatment and alternatives to the proposed treatment and have answered the patient's questions. If I have a significant financial interest in a co-management agreement or a significant financial interest in any product or implant, or other significant relationship used in this procedure/surgery, I have disclosed this and had a discussion with my patient.     _______________________________________________________________ _____________________________  (Signature of Physician)                                                                                         (Date)                                   (Time)    Patient Name: Marissa Delaney     : 1960                 Printed: January 10, 2024      Medical Record #: [id number]                      Page 2 of 2

## (undated) NOTE — LETTER
[facility]  [address], [place], [place]    Authorization for Surgical Operation and Procedure                               I hereby authorize Beltran Mauro MD, my physician and his/her assistants (if applicable), which may include medical students, residents, and/or fellows, to perform the following surgical operation/ procedure and administer such anesthesia as may be determined necessary by my physician: Operation/Procedure name (s) ESOPHAGOGASTRODUODENOSCOPY (EGD) on Marissa Delaney   2.   I recognize that during the surgical operation/procedure, unforeseen conditions may necessitate additional or different procedures than those listed above.  I, therefore, further authorize and request that the above-named surgeon, assistants, or designees perform such procedures as are, in their judgment, necessary and desirable.    3.   My surgeon/physician has discussed prior to my surgery the potential benefits, risks and side effects of this procedure; the likelihood of achieving goals; and potential problems that might occur during recuperation.  They also discussed reasonable alternatives to the procedure, including risks, benefits, and side effects related to the alternatives and risks related to not receiving this procedure.  I have had all my questions answered and I acknowledge that no guarantee has been made as to the result that may be obtained.    4.   Should the need arise during my operation/procedure, which includes change of level of care prior to discharge, I also consent to the administration of blood and/or blood products.  Further, I understand that despite careful testing and screening of blood or blood products by collecting agencies, I may still be subject to ill effects as a result of receiving a blood transfusion and/or blood products.  The following are some, but not all, of the potential risks that can occur: fever and allergic reactions, hemolytic reactions, transmission of  diseases such as Hepatitis, AIDS and Cytomegalovirus (CMV) and fluid overload.  In the event that I wish to have an autologous transfusion of my own blood, or a directed donor transfusion, I will discuss this with my physician.  Check only if Refusing Blood or Blood Products  I understand refusal of blood or blood products as deemed necessary by my physician may have serious consequences to my condition to include possible death. I hereby assume responsibility for my refusal and release the hospital, its personnel, and my physicians from any responsibility for the consequences of my refusal.    o  Refuse   5.   I authorize the use of any specimen, organs, tissues, body parts or foreign objects that may be removed from my body during the operation/procedure for diagnosis, research or teaching purposes and their subsequent disposal by hospital authorities.  I also authorize the release of specimen test results and/or written reports to my treating physician on the hospital medical staff or other referring or consulting physicians involved in my care, at the discretion of the Pathologist or my treating physician.    6.   I consent to the photographing or videotaping of the operations or procedures to be performed, including appropriate portions of my body for medical, scientific, or educational purposes, provided my identity is not revealed by the pictures or by descriptive texts accompanying them.  If the procedure has been photographed/videotaped, the surgeon will obtain the original picture, image, videotape or CD.  The hospital will not be responsible for storage, release or maintenance of the picture, image, tape or CD.    7.   I consent to the presence of a  or observers in the operating room as deemed necessary by my physician or their designees.    8.   I recognize that in the event my procedure results in extended X-Ray/fluoroscopy time, I may develop a skin reaction.    9. If I have a Do Not  Attempt Resuscitation (DNAR) order in place, that status will be suspended while in the operating room, procedural suite, and during the recovery period unless otherwise explicitly stated by me (or a person authorized to consent on my behalf). The surgeon or my attending physician will determine when the applicable recovery period ends for purposes of reinstating the DNAR order.  10. Patients having a sterilization procedure: I understand that if the procedure is successful the results will be permanent and it will therefore be impossible for me to inseminate, conceive, or bear children.  I also understand that the procedure is intended to result in sterility, although the result has not been guaranteed.   11. I acknowledge that my physician has explained sedation/analgesia administration to me including the risk and benefits I consent to the administration of sedation/analgesia as may be necessary or desirable in the judgment of my physician.    I CERTIFY THAT I HAVE READ AND FULLY UNDERSTAND THE ABOVE CONSENT TO OPERATION and/or OTHER PROCEDURE.     ____________________________________  _________________________________        ______________________________  Signature of Patient    Signature of Responsible Person                Printed Name of Responsible Person                                      ____________________________________  _____________________________                ________________________________  Signature of Witness        Date  Time         Relationship to Patient    STATEMENT OF PHYSICIAN My signature below affirms that prior to the time of the procedure; I have explained to the patient and/or his/her legal representative, the risks and benefits involved in the proposed treatment and any reasonable alternative to the proposed treatment. I have also explained the risks and benefits involved in refusal of the proposed treatment and alternatives to the proposed treatment and have answered the  patient's questions. If I have a significant financial interest in a co-management agreement or a significant financial interest in any product or implant, or other significant relationship used in this procedure/surgery, I have disclosed this and had a discussion with my patient.     _____________________________________________________              _____________________________  (Signature of Physician)                                                                                         (Date)                                   (Time)  Patient Name: Marissa Delaney      : 1960      Printed: 2025     Medical Record #: [id number]                                      Page 1 of 1

## (undated) NOTE — LETTER
[facility]  Administration of Anesthesia  IMarissa agree to be cared for by a physician anesthesiologist alone and/or with a nurse anesthetist, who is specially trained to monitor me and give me medicine to put me to sleep or keep me comfortable during my procedure    I understand that my anesthesiologist and/or anesthetist is not an employee or agent of [facility] or [employer]. He or she works for [facility]    As the patient asking for anesthesia services, I agree to:  Allow the anesthesiologist (anesthesia doctor) to give me medicine and do additional procedures as necessary. Some examples are: Starting or using an “IV” to give me medicine, fluids or blood during my procedure, and having a breathing tube placed to help me breathe when I’m asleep (intubation). In the event that my heart stops working properly, I understand that my anesthesiologist will make every effort to sustain my life, unless otherwise directed by [facility] Do Not Resuscitate documents.  Tell my anesthesia doctor before my procedure:  If I am pregnant.  The last time that I ate or drank.  iii. All of the medicines I take (including prescriptions, herbal supplements, and pills I can buy without a prescription (including street drugs/illegal medications). Failure to inform my anesthesiologist about these medicines may increase my risk of anesthetic complications.  iv.If I am allergic to anything or have had a reaction to anesthesia before.  I understand how the anesthesia medicine will help me (benefits).  I understand that with any type of anesthesia medicine there are risks:  The most common risks are: nausea, vomiting, sore throat, muscle soreness, damage to my eyes, mouth, or teeth (from breathing tube placement).  Rare risks include: remembering what happened during my procedure, allergic reactions to medications, injury to my airway, heart, lungs, vision, nerves, or muscles  and in extremely rare instances death.  My doctor has explained to me other choices available to me for my care (alternatives).  Pregnant Patients (“epidural”):  I understand that the risks of having an epidural (medicine given into my back to help control pain during labor), include itching, low blood pressure, difficulty urinating, headache or slowing of the baby’s heart. Very rare risks include infection, bleeding, seizure, irregular heart rhythms and nerve injury.  Regional Anesthesia (“spinal”, “epidural”, & “nerve blocks”):  I understand that rare but potential complications include headache, bleeding, infection, seizure, irregular heart rhythms, and nerve injury.    _____________________________________________________________________________  Patient (or Representative) Signature/Relationship to Patient  Date   Time    _____________________________________________________________________________   Name (if used)    Language/Organization   Time    _____________________________________________________________________________  Nurse Anesthetist Signature     Date   Time  _____________________________________________________________________________  Anesthesiologist Signature     Date   Time  I have discussed the procedure and information above with the patient (or patient’s representative) and answered their questions. The patient or their representative has agreed to have anesthesia services.    _____________________________________________________________________________  Witness        Date   Time  I have verified that the signature is that of the patient or patient’s representative, and that it was signed before the procedure  Patient Name: Marissa Delaney     : 1960                 Printed: 2025 at 4:24 PM    Medical Record #: [id number]                                            Page 1 of 1  ----------ANESTHESIA CONSENT----------

## (undated) NOTE — LETTER
[address], [place], [place]     AUTHORIZATION FOR SURGICAL OPERATION OR PROCEDURE    1.  I hereby authorize Dr. Hina Eastman MD, my Physician(s) and whomever may be designated as the doctor's Assistant, to perform t own blood, or a directed donor transfusion, I will discuss this with my Physician. 5. I consent to the photographing of procedure(s) to be performed for the purposes of advancing medicine, science and/or education, provided my identity is not revealed.  If _______________________________________________________________ ____________________________  (Witness signature)                                                                                                  (Date)                                (Time)

## (undated) NOTE — LETTER
[facility]  [address], [place], [place]    Authorization for Surgical Operation and Procedure                               I hereby authorize Beltran Mauro MD, my physician and his/her assistants (if applicable), which may include medical students, residents, and/or fellows, to perform the following surgical operation/ procedure and administer such anesthesia as may be determined necessary by my physician: Operation/Procedure name (s) ESOPHAGOGASTRODUODENOSCOPY (EGD) on Marissa Delaney   2.   I recognize that during the surgical operation/procedure, unforeseen conditions may necessitate additional or different procedures than those listed above.  I, therefore, further authorize and request that the above-named surgeon, assistants, or designees perform such procedures as are, in their judgment, necessary and desirable.    3.   My surgeon/physician has discussed prior to my surgery the potential benefits, risks and side effects of this procedure; the likelihood of achieving goals; and potential problems that might occur during recuperation.  They also discussed reasonable alternatives to the procedure, including risks, benefits, and side effects related to the alternatives and risks related to not receiving this procedure.  I have had all my questions answered and I acknowledge that no guarantee has been made as to the result that may be obtained.    4.   Should the need arise during my operation/procedure, which includes change of level of care prior to discharge, I also consent to the administration of blood and/or blood products.  Further, I understand that despite careful testing and screening of blood or blood products by collecting agencies, I may still be subject to ill effects as a result of receiving a blood transfusion and/or blood products.  The following are some, but not all, of the potential risks that can occur: fever and allergic reactions, hemolytic reactions, transmission of  diseases such as Hepatitis, AIDS and Cytomegalovirus (CMV) and fluid overload.  In the event that I wish to have an autologous transfusion of my own blood, or a directed donor transfusion, I will discuss this with my physician.  Check only if Refusing Blood or Blood Products  I understand refusal of blood or blood products as deemed necessary by my physician may have serious consequences to my condition to include possible death. I hereby assume responsibility for my refusal and release the hospital, its personnel, and my physicians from any responsibility for the consequences of my refusal.    o  Refuse   5.   I authorize the use of any specimen, organs, tissues, body parts or foreign objects that may be removed from my body during the operation/procedure for diagnosis, research or teaching purposes and their subsequent disposal by hospital authorities.  I also authorize the release of specimen test results and/or written reports to my treating physician on the hospital medical staff or other referring or consulting physicians involved in my care, at the discretion of the Pathologist or my treating physician.    6.   I consent to the photographing or videotaping of the operations or procedures to be performed, including appropriate portions of my body for medical, scientific, or educational purposes, provided my identity is not revealed by the pictures or by descriptive texts accompanying them.  If the procedure has been photographed/videotaped, the surgeon will obtain the original picture, image, videotape or CD.  The hospital will not be responsible for storage, release or maintenance of the picture, image, tape or CD.    7.   I consent to the presence of a  or observers in the operating room as deemed necessary by my physician or their designees.    8.   I recognize that in the event my procedure results in extended X-Ray/fluoroscopy time, I may develop a skin reaction.    9. If I have a Do Not  Attempt Resuscitation (DNAR) order in place, that status will be suspended while in the operating room, procedural suite, and during the recovery period unless otherwise explicitly stated by me (or a person authorized to consent on my behalf). The surgeon or my attending physician will determine when the applicable recovery period ends for purposes of reinstating the DNAR order.  10. Patients having a sterilization procedure: I understand that if the procedure is successful the results will be permanent and it will therefore be impossible for me to inseminate, conceive, or bear children.  I also understand that the procedure is intended to result in sterility, although the result has not been guaranteed.   11. I acknowledge that my physician has explained sedation/analgesia administration to me including the risk and benefits I consent to the administration of sedation/analgesia as may be necessary or desirable in the judgment of my physician.    I CERTIFY THAT I HAVE READ AND FULLY UNDERSTAND THE ABOVE CONSENT TO OPERATION and/or OTHER PROCEDURE.     ____________________________________  _________________________________        ______________________________  Signature of Patient    Signature of Responsible Person                Printed Name of Responsible Person                                      ____________________________________  _____________________________                ________________________________  Signature of Witness        Date  Time         Relationship to Patient    STATEMENT OF PHYSICIAN My signature below affirms that prior to the time of the procedure; I have explained to the patient and/or his/her legal representative, the risks and benefits involved in the proposed treatment and any reasonable alternative to the proposed treatment. I have also explained the risks and benefits involved in refusal of the proposed treatment and alternatives to the proposed treatment and have answered the  patient's questions. If I have a significant financial interest in a co-management agreement or a significant financial interest in any product or implant, or other significant relationship used in this procedure/surgery, I have disclosed this and had a discussion with my patient.     _____________________________________________________              _____________________________  (Signature of Physician)                                                                                         (Date)                                   (Time)  Patient Name: Marissa Delaney      : 1960      Printed: 2025     Medical Record #: [id number]                                      Page 1 of 1

## (undated) NOTE — LETTER
01/13/22        Jose Yun  [address] [address]      Dear Peggy Ibarra,    [address] [facility] records indicate that you have outstanding lab work and or testing that was ordered for you and has not yet been completed:  Orders Placed This Encounter

## (undated) NOTE — LETTER
12/21/18        Sharon Thorpe  [address]  [address] [facility] [address]      Dear Gay Muñiz,    [address] [facility] records indicate that you have outstanding lab work and or testing that was ordered for you and has not yet been completed:     CBC w/Diff     To provide you with

## (undated) NOTE — LETTER
[place] [address]  Authorization for Invasive Procedures  Date: 06/26/2025           Time: 19:01    I hereby authorize Dr. Mauro, my physician and his/her assistants (if applicable), which may include medical students, residents, and/or fellows, to perform the following surgical operation/ procedure and administer such anesthesia as may be determined necessary by my physician: Esophagogastroduodenoscopy  on Marissa Delaney  2.   I recognize that during the surgical operation/procedure, unforeseen conditions may necessitate additional or different procedures than those listed above.  I, therefore, further authorize and request that the above-named surgeon, assistants, or designees perform such procedures as are, in their judgment, necessary and desirable.    3.   My surgeon/physician has discussed prior to my surgery the potential benefits, risks and side effects of this procedure; the likelihood of achieving goals; and potential problems that might occur during recuperation.  They also discussed reasonable alternatives to the procedure, including risks, benefits, and side effects related to the alternatives and risks related to not receiving this procedure.  I have had all my questions answered and I acknowledge that no guarantee has been made as to the result that may be obtained.    4.   Should the need arise during my operation/procedure, which includes change of level of care prior to discharge, I also consent to the administration of blood and/or blood products.  Further, I understand that despite careful testing and screening of blood or blood products by collecting agencies, I may still be subject to ill effects as a result of receiving a blood transfusion and/or blood products.  The following are some, but not all, of the potential risks that can occur: fever and allergic reactions, hemolytic reactions, transmission of diseases such as Hepatitis, AIDS and Cytomegalovirus (CMV) and  fluid overload.  In the event that I wish to have an autologous transfusion of my own blood, or a directed donor transfusion, I will discuss this with my physician.   Check only if Refusing Blood or Blood Products  I understand refusal of blood or blood products as deemed necessary by my physician may have serious consequences to my condition to include possible death. I hereby assume responsibility for my refusal and release the hospital, its personnel, and my physicians from any responsibility for the consequences of my refusal.         o  Refuse         5.   I authorize the use of any specimen, organs, tissues, body parts or foreign objects that may be removed from my body during the operation/procedure for diagnosis, research or teaching purposes and their subsequent disposal by hospital authorities.  I also authorize the release of specimen test results and/or written reports to my treating physician on the hospital medical staff or other referring or consulting physicians involved in my care, at the discretion of the Pathologist or my treating physician.    6.   I consent to the photographing or videotaping of the operations or procedures to be performed, including appropriate portions of my body for medical, scientific, or educational purposes, provided my identity is not revealed by the pictures or by descriptive texts accompanying them.  If the procedure has been photographed/videotaped, the surgeon will obtain the original picture, image, videotape or CD.  The hospital will not be responsible for storage, release or maintenance of the picture, image, tape or CD.    7.   I consent to the presence of a  or observers in the operating room as deemed necessary by my physician or their designees.    8.   I recognize that in the event my procedure results in extended X-Ray/fluoroscopy time, I may develop a skin reaction.    9. If I have a Do Not Attempt Resuscitation (DNAR) order in place, that  status will be suspended while in the operating room, procedural suite, and during the recovery period unless otherwise explicitly stated by me (or a person authorized to consent on my behalf). The surgeon or my attending physician will determine when the applicable recovery period ends for purposes of reinstating the DNAR order.  10. Patients having a sterilization procedure: I understand that if the procedure is successful the results will be permanent and it will therefore be impossible for me to inseminate, conceive, or bear children.  I also understand that the procedure is intended to result in sterility, although the result has not been guaranteed.   11. I acknowledge that my physician has explained sedation/analgesia administration to me including the risk and benefits I consent to the administration of sedation/analgesia as may be necessary or desirable in the judgment of my physician.    I CERTIFY THAT I HAVE READ AND FULLY UNDERSTAND THE ABOVE CONSENT TO OPERATION and/or OTHER PROCEDURE.        ____________________________________       _________________________________      ______________________________  Signature of Patient         Signature of Responsible Person        Printed Name of Responsible Person        ____________________________________      _________________________________      ______________________________       Signature of Witness          Relationship to Patient                       Date                                       Time  Patient Name: Marissa Delaney  : 1960    Reviewed: 2024   Printed: 2025  Medical Record #: [id number] Page 1 of 2             STATEMENT OF PHYSICIAN My signature below affirms that prior to the time of the procedure; I have explained to the patient and/or his/her legal representative, the risks and benefits involved in the proposed treatment and any reasonable alternative to the proposed treatment. I have also explained the risks and  benefits involved in refusal of the proposed treatment and alternatives to the proposed treatment and have answered the patient's questions. If I have a significant financial interest in a co-management agreement or a significant financial interest in any product or implant, or other significant relationship used in this procedure/surgery, I have disclosed this and had a discussion with my patient.     _______________________________________________________________ _____________________________  (Signature of Physician)                                                                                         (Date)                                   (Time)  Patient Name: Marissa Delaney  : 1960    Reviewed: 2024   Printed: 2025  Medical Record #: [id number] Page 2 of 2

## (undated) NOTE — LETTER
[facility]  Administration of Anesthesia  IMarissa agree to be cared for by a physician anesthesiologist alone and/or with a nurse anesthetist, who is specially trained to monitor me and give me medicine to put me to sleep or keep me comfortable during my procedure    I understand that my anesthesiologist and/or anesthetist is not an employee or agent of [facility] or [employer]. He or she works for [facility]    As the patient asking for anesthesia services, I agree to:  Allow the anesthesiologist (anesthesia doctor) to give me medicine and do additional procedures as necessary. Some examples are: Starting or using an “IV” to give me medicine, fluids or blood during my procedure, and having a breathing tube placed to help me breathe when I’m asleep (intubation). In the event that my heart stops working properly, I understand that my anesthesiologist will make every effort to sustain my life, unless otherwise directed by [facility] Do Not Resuscitate documents.  Tell my anesthesia doctor before my procedure:  If I am pregnant.  The last time that I ate or drank.  iii. All of the medicines I take (including prescriptions, herbal supplements, and pills I can buy without a prescription (including street drugs/illegal medications). Failure to inform my anesthesiologist about these medicines may increase my risk of anesthetic complications.  iv.If I am allergic to anything or have had a reaction to anesthesia before.  I understand how the anesthesia medicine will help me (benefits).  I understand that with any type of anesthesia medicine there are risks:  The most common risks are: nausea, vomiting, sore throat, muscle soreness, damage to my eyes, mouth, or teeth (from breathing tube placement).  Rare risks include: remembering what happened during my procedure, allergic reactions to medications, injury to my airway, heart, lungs, vision, nerves, or muscles  and in extremely rare instances death.  My doctor has explained to me other choices available to me for my care (alternatives).  Pregnant Patients (“epidural”):  I understand that the risks of having an epidural (medicine given into my back to help control pain during labor), include itching, low blood pressure, difficulty urinating, headache or slowing of the baby’s heart. Very rare risks include infection, bleeding, seizure, irregular heart rhythms and nerve injury.  Regional Anesthesia (“spinal”, “epidural”, & “nerve blocks”):  I understand that rare but potential complications include headache, bleeding, infection, seizure, irregular heart rhythms, and nerve injury.    _____________________________________________________________________________  Patient (or Representative) Signature/Relationship to Patient  Date   Time    _____________________________________________________________________________   Name (if used)    Language/Organization   Time    _____________________________________________________________________________  Nurse Anesthetist Signature     Date   Time  _____________________________________________________________________________  Anesthesiologist Signature     Date   Time  I have discussed the procedure and information above with the patient (or patient’s representative) and answered their questions. The patient or their representative has agreed to have anesthesia services.    _____________________________________________________________________________  Witness        Date   Time  I have verified that the signature is that of the patient or patient’s representative, and that it was signed before the procedure  Patient Name: Marissa Delaney     : 1960                 Printed: 1/10/2024 at 11:29 AM    Medical Record #: [id number]                                            Page 1 of 1  ----------ANESTHESIA CONSENT----------

## (undated) NOTE — LETTER
07/01/20        Gerri Hartman  [address]  [place] [address]      Dear Matthew Jain,    [facility] records indicate that you have outstanding lab work and or testing that was ordered for you and has not yet been completed:  Orders Placed This Encounter      Mamm

## (undated) NOTE — LETTER
12/06/17        Helena Campa  [address] [address]      Dear Archana Shay,    [facility] records indicate that you have outstanding lab work and or testing that was ordered for you and has not yet been completed:          TSH [E]      Triiodothyronine

## (undated) NOTE — LETTER
[facility]  Administration of Anesthesia  IMarissa agree to be cared for by a physician anesthesiologist alone and/or with a nurse anesthetist, who is specially trained to monitor me and give me medicine to put me to sleep or keep me comfortable during my procedure    I understand that my anesthesiologist and/or anesthetist is not an employee or agent of [facility] or [employer]. He or she works for [facility]    As the patient asking for anesthesia services, I agree to:  Allow the anesthesiologist (anesthesia doctor) to give me medicine and do additional procedures as necessary. Some examples are: Starting or using an “IV” to give me medicine, fluids or blood during my procedure, and having a breathing tube placed to help me breathe when I’m asleep (intubation). In the event that my heart stops working properly, I understand that my anesthesiologist will make every effort to sustain my life, unless otherwise directed by [facility] Do Not Resuscitate documents.  Tell my anesthesia doctor before my procedure:  If I am pregnant.  The last time that I ate or drank.  iii. All of the medicines I take (including prescriptions, herbal supplements, and pills I can buy without a prescription (including street drugs/illegal medications). Failure to inform my anesthesiologist about these medicines may increase my risk of anesthetic complications.  iv.If I am allergic to anything or have had a reaction to anesthesia before.  I understand how the anesthesia medicine will help me (benefits).  I understand that with any type of anesthesia medicine there are risks:  The most common risks are: nausea, vomiting, sore throat, muscle soreness, damage to my eyes, mouth, or teeth (from breathing tube placement).  Rare risks include: remembering what happened during my procedure, allergic reactions to medications, injury to my airway, heart, lungs, vision, nerves, or muscles  and in extremely rare instances death.  My doctor has explained to me other choices available to me for my care (alternatives).  Pregnant Patients (“epidural”):  I understand that the risks of having an epidural (medicine given into my back to help control pain during labor), include itching, low blood pressure, difficulty urinating, headache or slowing of the baby’s heart. Very rare risks include infection, bleeding, seizure, irregular heart rhythms and nerve injury.  Regional Anesthesia (“spinal”, “epidural”, & “nerve blocks”):  I understand that rare but potential complications include headache, bleeding, infection, seizure, irregular heart rhythms, and nerve injury.    _____________________________________________________________________________  Patient (or Representative) Signature/Relationship to Patient  Date   Time    _____________________________________________________________________________   Name (if used)    Language/Organization   Time    _____________________________________________________________________________  Nurse Anesthetist Signature     Date   Time  _____________________________________________________________________________  Anesthesiologist Signature     Date   Time  I have discussed the procedure and information above with the patient (or patient’s representative) and answered their questions. The patient or their representative has agreed to have anesthesia services.    _____________________________________________________________________________  Witness        Date   Time  I have verified that the signature is that of the patient or patient’s representative, and that it was signed before the procedure  Patient Name: Marissa Delaney     : 1960                 Printed: 2025 at 6:48 AM    Medical Record #: [id number]                                            Page 1 of 1  ----------ANESTHESIA CONSENT----------